# Patient Record
Sex: MALE | Race: OTHER
[De-identification: names, ages, dates, MRNs, and addresses within clinical notes are randomized per-mention and may not be internally consistent; named-entity substitution may affect disease eponyms.]

---

## 2017-10-23 ENCOUNTER — HOSPITAL ENCOUNTER (OUTPATIENT)
Dept: HOSPITAL 22 - SDC | Age: 62
End: 2017-10-23
Attending: INTERNAL MEDICINE
Payer: COMMERCIAL

## 2017-10-23 VITALS — DIASTOLIC BLOOD PRESSURE: 96 MMHG | SYSTOLIC BLOOD PRESSURE: 165 MMHG

## 2017-10-23 DIAGNOSIS — Z53.9: Primary | ICD-10-CM

## 2017-12-15 ENCOUNTER — HOSPITAL ENCOUNTER (OUTPATIENT)
Dept: HOSPITAL 22 - SDC | Age: 62
Discharge: HOME | End: 2017-12-15
Attending: INTERNAL MEDICINE
Payer: COMMERCIAL

## 2017-12-15 VITALS — DIASTOLIC BLOOD PRESSURE: 76 MMHG | SYSTOLIC BLOOD PRESSURE: 129 MMHG

## 2017-12-15 DIAGNOSIS — K63.5: ICD-10-CM

## 2017-12-15 DIAGNOSIS — Z12.11: Primary | ICD-10-CM

## 2017-12-15 DIAGNOSIS — K57.30: ICD-10-CM

## 2017-12-15 DIAGNOSIS — K64.0: ICD-10-CM

## 2017-12-15 PROCEDURE — 0DBM8ZX EXCISION OF DESCENDING COLON, VIA NATURAL OR ARTIFICIAL OPENING ENDOSCOPIC, DIAGNOSTIC: ICD-10-PCS | Performed by: INTERNAL MEDICINE

## 2017-12-15 NOTE — OPERATIVE NOTE
Colonoscopy ([facility])
Procedure date: 12/15/17
Date of birth:
[birth year]
 
Procedure:Colonoscopy
Colonoscopy with cold snare polypectomy
Indications:
Mr. Gracía is a 62-year-old gentleman who is here for follow-up screening 
colonoscopy. The patient did have a colonoscopy in September 2011 at which time 
for colon polyps (tubular adenomas 4) were removed. He reports no abdominal 
pain, weight loss, change in bowel habits or rectal bleeding. He reports no 
family history of colon cancer.
Performing Provider:
Alverto Urias MD
 
Referrring Provider:
Vinayak Alva M.D.
 
Sedation:
Fentanyl 100 mg IV/Versed 5 mg IV
Procedure:
Prior to the procedure, a history and physical exam was performed, and patient 
medications and allergies were reviewed. The risks and benefits of the procedure
and the sedation options and risks were discussed with the patient. All 
questions were answered and informed consent was obtained. Patient 
identification and proposed procedure were verified by the physician and the 
nurse. The patient was placed in a left lateral decubitus position. Throughout 
the procedure, the patient's blood pressure, pulse, and oxygen saturations were 
monitored continuously.  
 
Findings:
On digital rectal examination there was normal rectal tone. There were no 
external hemorrhoids. The prostate was 2+, mildly firm but symmetric without 
nodules. The colonoscope was introduced through the anal canal to the rectum and
advanced to the cecum. The ileocecal valve and appendiceal orifice were 
identified. The scope was advanced a short distance into the ileum which 
appeared grossly normal. The scope was then withdrawn into the colon. The cecum,
ascending and transverse colon and mucosa were grossly normal. There were 
scattered diverticuli throughout the descending and sigmoid colon (LEFT colon). 
There was a single 6 mm polyp in the descending colon removed via cold snare 
polypectomy. The rectum itself was normal. Upon retroflexion within the rectum 
there were grade 1 internal hemorrhoids.
Impressions:
1. Descending colon polyp
2. Mild left-sided diverticulosis
3. Grade 1 internal hemorrhoids
Recommendations:
I will follow up the polyp pathology and recommend repeat colonoscopy again in 5
years based upon the polyp histology.
 
I would encourage fiber supplementation on a long-term daily maintenance basis.
Complications:
None
 
 
 
EBL (ml): 0
 
Electronically Signed by ALVERTO URIAS on 12/15/17 at 0921

## 2017-12-15 NOTE — OPERATIVE NOTE
Colonoscopy ([facility])
Procedure date: 12/15/17
Date of birth:
[birth year]
 
Procedure:Colonoscopy
Colonoscopy with cold snare polypectomy
Indications:
Mr. García is a 62-year-old gentleman who is here for follow-up screening 
colonoscopy. The patient did have a colonoscopy in September 2011 at which time 
for colon polyps (tubular adenomas 4) were removed. He reports no abdominal 
pain, weight loss, change in bowel habits or rectal bleeding. He reports no 
family history of colon cancer.
Performing Provider:
Alverto Urias MD
 
Referrring Provider:
Vinayak Alva M.D.
 
Sedation:
Fentanyl 100 mg IV/Versed 5 mg IV
Procedure:
Prior to the procedure, a history and physical exam was performed, and patient 
medications and allergies were reviewed. The risks and benefits of the procedure
and the sedation options and risks were discussed with the patient. All 
questions were answered and informed consent was obtained. Patient 
identification and proposed procedure were verified by the physician and the 
nurse. The patient was placed in a left lateral decubitus position. Throughout 
the procedure, the patient's blood pressure, pulse, and oxygen saturations were 
monitored continuously.  
 
Findings:
On digital rectal examination there was normal rectal tone. There were no 
external hemorrhoids. The prostate was 2+, mildly firm but symmetric without 
nodules. The colonoscope was introduced through the anal canal to the rectum and
advanced to the cecum. The ileocecal valve and appendiceal orifice were 
identified. The scope was advanced a short distance into the ileum which 
appeared grossly normal. The scope was then withdrawn into the colon. The cecum,
ascending and transverse colon and mucosa were grossly normal. There were 
scattered diverticuli throughout the descending and sigmoid colon (LEFT colon). 
There was a single 6 mm polyp in the descending colon removed via cold snare 
polypectomy. The rectum itself was normal. Upon retroflexion within the rectum 
there were grade 1 internal hemorrhoids.
Impressions:
1. Descending colon polyp
2. Mild left-sided diverticulosis
3. Grade 1 internal hemorrhoids
Recommendations:
I will follow up the polyp pathology and recommend repeat colonoscopy again in 5
years based upon the polyp histology.
 
I would encourage fiber supplementation on a long-term daily maintenance basis.
Complications:
None
 
 
 
EBL (ml): 0
 
Electronically Signed by ALVERTO URIAS on 12/15/17 at 0921

## 2019-07-19 ENCOUNTER — HOSPITAL ENCOUNTER (INPATIENT)
Facility: HOSPITAL | Age: 64
LOS: 7 days | Discharge: HOME HEALTH | End: 2019-07-26
Attending: INTERNAL MEDICINE | Admitting: ORTHOPAEDIC SURGERY

## 2019-07-19 ENCOUNTER — OFFICE VISIT (OUTPATIENT)
Dept: ORTHOPEDIC SURGERY | Facility: CLINIC | Age: 64
End: 2019-07-19

## 2019-07-19 VITALS — HEIGHT: 68 IN | BODY MASS INDEX: 27.74 KG/M2 | WEIGHT: 183 LBS | OXYGEN SATURATION: 92 % | HEART RATE: 100 BPM

## 2019-07-19 DIAGNOSIS — Z98.890 S/P DEBRIDEMENT: ICD-10-CM

## 2019-07-19 DIAGNOSIS — E11.65 UNCONTROLLED TYPE 2 DIABETES MELLITUS WITH HYPERGLYCEMIA (HCC): ICD-10-CM

## 2019-07-19 DIAGNOSIS — M86.10 OSTEOMYELITIS, ACUTE (HCC): Primary | ICD-10-CM

## 2019-07-19 DIAGNOSIS — M86.372 CHRONIC MULTIFOCAL OSTEOMYELITIS OF LEFT FOOT (HCC): ICD-10-CM

## 2019-07-19 DIAGNOSIS — M86.10 OSTEOMYELITIS, ACUTE (HCC): ICD-10-CM

## 2019-07-19 DIAGNOSIS — Z74.09 IMPAIRED FUNCTIONAL MOBILITY, BALANCE, GAIT, AND ENDURANCE: Primary | ICD-10-CM

## 2019-07-19 DIAGNOSIS — E11.42 DIABETIC POLYNEUROPATHY ASSOCIATED WITH TYPE 2 DIABETES MELLITUS (HCC): ICD-10-CM

## 2019-07-19 DIAGNOSIS — G89.18 ACUTE POSTOPERATIVE PAIN: ICD-10-CM

## 2019-07-19 DIAGNOSIS — R09.89 DECREASED PULSES IN FEET: ICD-10-CM

## 2019-07-19 PROBLEM — M86.9 OSTEOMYELITIS OF LEFT FOOT (HCC): Status: ACTIVE | Noted: 2019-07-19

## 2019-07-19 LAB
ANION GAP SERPL CALCULATED.3IONS-SCNC: 16 MMOL/L (ref 5–15)
BASOPHILS # BLD AUTO: 0.09 10*3/MM3 (ref 0–0.2)
BASOPHILS NFR BLD AUTO: 0.9 % (ref 0–1.5)
BUN BLD-MCNC: 24 MG/DL (ref 8–23)
BUN/CREAT SERPL: 22.2 (ref 7–25)
CALCIUM SPEC-SCNC: 8.7 MG/DL (ref 8.6–10.5)
CHLORIDE SERPL-SCNC: 96 MMOL/L (ref 98–107)
CO2 SERPL-SCNC: 20 MMOL/L (ref 22–29)
CREAT BLD-MCNC: 1.08 MG/DL (ref 0.76–1.27)
CRP SERPL-MCNC: 1.89 MG/DL (ref 0–0.5)
DEPRECATED RDW RBC AUTO: 45.1 FL (ref 37–54)
EOSINOPHIL # BLD AUTO: 0.2 10*3/MM3 (ref 0–0.4)
EOSINOPHIL NFR BLD AUTO: 2.1 % (ref 0.3–6.2)
ERYTHROCYTE [DISTWIDTH] IN BLOOD BY AUTOMATED COUNT: 13 % (ref 12.3–15.4)
ERYTHROCYTE [SEDIMENTATION RATE] IN BLOOD: 93 MM/HR (ref 0–20)
GFR SERPL CREATININE-BSD FRML MDRD: 69 ML/MIN/1.73
GLUCOSE BLD-MCNC: 139 MG/DL (ref 65–99)
GLUCOSE BLDC GLUCOMTR-MCNC: 110 MG/DL (ref 70–130)
GLUCOSE BLDC GLUCOMTR-MCNC: 220 MG/DL (ref 70–130)
HBA1C MFR BLD: 12.4 % (ref 4.8–5.6)
HCT VFR BLD AUTO: 37.8 % (ref 37.5–51)
HGB BLD-MCNC: 11.9 G/DL (ref 13–17.7)
IMM GRANULOCYTES # BLD AUTO: 0.08 10*3/MM3 (ref 0–0.05)
IMM GRANULOCYTES NFR BLD AUTO: 0.8 % (ref 0–0.5)
LYMPHOCYTES # BLD AUTO: 1.45 10*3/MM3 (ref 0.7–3.1)
LYMPHOCYTES NFR BLD AUTO: 15.2 % (ref 19.6–45.3)
MCH RBC QN AUTO: 29.7 PG (ref 26.6–33)
MCHC RBC AUTO-ENTMCNC: 31.5 G/DL (ref 31.5–35.7)
MCV RBC AUTO: 94.3 FL (ref 79–97)
MONOCYTES # BLD AUTO: 0.62 10*3/MM3 (ref 0.1–0.9)
MONOCYTES NFR BLD AUTO: 6.5 % (ref 5–12)
NEUTROPHILS # BLD AUTO: 7.13 10*3/MM3 (ref 1.7–7)
NEUTROPHILS NFR BLD AUTO: 74.5 % (ref 42.7–76)
NRBC BLD AUTO-RTO: 0 /100 WBC (ref 0–0.2)
PLATELET # BLD AUTO: 547 10*3/MM3 (ref 140–450)
PMV BLD AUTO: 8.8 FL (ref 6–12)
POTASSIUM BLD-SCNC: 3.6 MMOL/L (ref 3.5–5.2)
RBC # BLD AUTO: 4.01 10*6/MM3 (ref 4.14–5.8)
SODIUM BLD-SCNC: 132 MMOL/L (ref 136–145)
WBC NRBC COR # BLD: 9.57 10*3/MM3 (ref 3.4–10.8)

## 2019-07-19 PROCEDURE — 83036 HEMOGLOBIN GLYCOSYLATED A1C: CPT | Performed by: ORTHOPAEDIC SURGERY

## 2019-07-19 PROCEDURE — 87040 BLOOD CULTURE FOR BACTERIA: CPT | Performed by: ORTHOPAEDIC SURGERY

## 2019-07-19 PROCEDURE — 25010000002 PIPERACILLIN SOD-TAZOBACTAM PER 1 G: Performed by: NURSE PRACTITIONER

## 2019-07-19 PROCEDURE — 63710000001 INSULIN LISPRO (HUMAN) PER 5 UNITS: Performed by: INTERNAL MEDICINE

## 2019-07-19 PROCEDURE — 85025 COMPLETE CBC W/AUTO DIFF WBC: CPT | Performed by: ORTHOPAEDIC SURGERY

## 2019-07-19 PROCEDURE — 85652 RBC SED RATE AUTOMATED: CPT | Performed by: ORTHOPAEDIC SURGERY

## 2019-07-19 PROCEDURE — 25010000002 ENOXAPARIN PER 10 MG: Performed by: INTERNAL MEDICINE

## 2019-07-19 PROCEDURE — 82962 GLUCOSE BLOOD TEST: CPT

## 2019-07-19 PROCEDURE — 86140 C-REACTIVE PROTEIN: CPT | Performed by: ORTHOPAEDIC SURGERY

## 2019-07-19 PROCEDURE — 99205 OFFICE O/P NEW HI 60 MIN: CPT | Performed by: ORTHOPAEDIC SURGERY

## 2019-07-19 PROCEDURE — 80048 BASIC METABOLIC PNL TOTAL CA: CPT | Performed by: ORTHOPAEDIC SURGERY

## 2019-07-19 PROCEDURE — 63710000001 INSULIN DETEMIR PER 5 UNITS: Performed by: INTERNAL MEDICINE

## 2019-07-19 RX ORDER — SODIUM CHLORIDE 0.9 % (FLUSH) 0.9 %
3 SYRINGE (ML) INJECTION EVERY 12 HOURS SCHEDULED
Status: DISCONTINUED | OUTPATIENT
Start: 2019-07-19 | End: 2019-07-26 | Stop reason: HOSPADM

## 2019-07-19 RX ORDER — SODIUM CHLORIDE 0.9 % (FLUSH) 0.9 %
3-10 SYRINGE (ML) INJECTION AS NEEDED
Status: DISCONTINUED | OUTPATIENT
Start: 2019-07-19 | End: 2019-07-26 | Stop reason: HOSPADM

## 2019-07-19 RX ORDER — ONDANSETRON 2 MG/ML
4 INJECTION INTRAMUSCULAR; INTRAVENOUS EVERY 6 HOURS PRN
Status: DISCONTINUED | OUTPATIENT
Start: 2019-07-19 | End: 2019-07-23

## 2019-07-19 RX ORDER — DEXTROSE MONOHYDRATE 25 G/50ML
25 INJECTION, SOLUTION INTRAVENOUS
Status: DISCONTINUED | OUTPATIENT
Start: 2019-07-19 | End: 2019-07-26 | Stop reason: HOSPADM

## 2019-07-19 RX ORDER — CALCIUM CARBONATE 200(500)MG
2 TABLET,CHEWABLE ORAL 2 TIMES DAILY PRN
Status: DISCONTINUED | OUTPATIENT
Start: 2019-07-19 | End: 2019-07-26 | Stop reason: HOSPADM

## 2019-07-19 RX ORDER — HUMAN INSULIN 100 [IU]/ML
INJECTION, SOLUTION SUBCUTANEOUS
COMMUNITY
Start: 2019-07-15

## 2019-07-19 RX ORDER — NICOTINE POLACRILEX 4 MG
15 LOZENGE BUCCAL
Status: DISCONTINUED | OUTPATIENT
Start: 2019-07-19 | End: 2019-07-26 | Stop reason: HOSPADM

## 2019-07-19 RX ORDER — AMOXICILLIN AND CLAVULANATE POTASSIUM 875; 125 MG/1; MG/1
TABLET, FILM COATED ORAL
COMMUNITY
Start: 2019-07-15 | End: 2019-07-26 | Stop reason: HOSPADM

## 2019-07-19 RX ORDER — ONDANSETRON 4 MG/1
4 TABLET, FILM COATED ORAL EVERY 6 HOURS PRN
Status: DISCONTINUED | OUTPATIENT
Start: 2019-07-19 | End: 2019-07-26 | Stop reason: HOSPADM

## 2019-07-19 RX ORDER — DOCUSATE SODIUM 100 MG/1
100 CAPSULE, LIQUID FILLED ORAL 2 TIMES DAILY PRN
Status: DISCONTINUED | OUTPATIENT
Start: 2019-07-19 | End: 2019-07-26 | Stop reason: HOSPADM

## 2019-07-19 RX ORDER — SODIUM CHLORIDE 0.9 % (FLUSH) 0.9 %
5 SYRINGE (ML) INJECTION AS NEEDED
Status: DISCONTINUED | OUTPATIENT
Start: 2019-07-19 | End: 2019-07-26 | Stop reason: HOSPADM

## 2019-07-19 RX ORDER — SYRING-NEEDL,DISP,INSUL,0.3 ML 31 GX5/16"
SYRINGE, EMPTY DISPOSABLE MISCELLANEOUS
COMMUNITY
Start: 2019-07-15

## 2019-07-19 RX ORDER — GABAPENTIN 600 MG/1
TABLET ORAL
COMMUNITY
Start: 2019-06-13

## 2019-07-19 RX ORDER — ACETAMINOPHEN 325 MG/1
650 TABLET ORAL EVERY 4 HOURS PRN
Status: DISCONTINUED | OUTPATIENT
Start: 2019-07-19 | End: 2019-07-26 | Stop reason: HOSPADM

## 2019-07-19 RX ADMIN — SODIUM CHLORIDE, PRESERVATIVE FREE 3 ML: 5 INJECTION INTRAVENOUS at 20:40

## 2019-07-19 RX ADMIN — SODIUM CHLORIDE, PRESERVATIVE FREE 3 ML: 5 INJECTION INTRAVENOUS at 13:00

## 2019-07-19 RX ADMIN — INSULIN DETEMIR 50 UNITS: 100 INJECTION, SOLUTION SUBCUTANEOUS at 16:55

## 2019-07-19 RX ADMIN — ENOXAPARIN SODIUM 40 MG: 40 INJECTION SUBCUTANEOUS at 15:44

## 2019-07-19 RX ADMIN — ACETAMINOPHEN 650 MG: 325 TABLET, FILM COATED ORAL at 20:48

## 2019-07-19 RX ADMIN — INSULIN LISPRO 5 UNITS: 100 INJECTION, SOLUTION INTRAVENOUS; SUBCUTANEOUS at 17:09

## 2019-07-19 RX ADMIN — INSULIN LISPRO 4 UNITS: 100 INJECTION, SOLUTION INTRAVENOUS; SUBCUTANEOUS at 17:08

## 2019-07-19 RX ADMIN — TAZOBACTAM SODIUM AND PIPERACILLIN SODIUM 3.38 G: 375; 3 INJECTION, SOLUTION INTRAVENOUS at 20:40

## 2019-07-19 NOTE — PROGRESS NOTES
"NEW PATIENT    Patient: Scott Benz  : 1955    Primary Care Provider: Ottoniel Florez MD    Requesting Provider: As above    Pain of the Left Foot       History    Chief Complaint: Left foot fourth toe and metatarsal amputation    History of Present Illness: This is a 64-year-old gentleman here today with his wife.  He is seen at the request of Dr. Ottoniel Florez.  He has a complex history with respect to the left foot.  He has long-standing diabetes with very poor control.  He reports that his most recent hemoglobin A1c was over 12.  He had developed a red spot on his left foot around .  They treated it with topical antibiotic cream.  He was admitted to the [facility] on  and underwent irrigation and debridement x2.  He had amputation of the fourth toe and distal aspect of the metatarsal.  The wound was left open with a wound VAC.  He reports he had IV antibiotics for 7 days.  He was then discharged home on oral Augmentin.  He did not bring any cultures or records.  We had to call to find them.  He thinks he had vascular studies done somewhere in [place] about a year ago.  They are not in the [facility].  His wife has pictures of his foot, it shows diabetic foot infection with very dusky tissue.  He saw Dr. Florez after he was discharged and asked him for another opinion.  He has a wound VAC in place today.  He is in a wheelchair.  He does not smoke.    No current facility-administered medications on file prior to visit.      Current Outpatient Medications on File Prior to Visit   Medication Sig Dispense Refill   • amoxicillin-clavulanate (AUGMENTIN) 875-125 MG per tablet      • gabapentin (NEURONTIN) 600 MG tablet      • NOVOLIN R 100 UNIT/ML injection      • TRUEPLUS INSULIN SYRINGE 31G X \" 0.3 ML misc         No Known Allergies   Past Medical History:   Diagnosis Date   • Diabetes (CMS/[facility])      Past Surgical History:   Procedure Laterality Date   • AMPUTATION DIGIT Left " "7/23/2019    Procedure: excision left 4th metatarsal;  Surgeon: Shannan Kraft MD;  Location:  [facility];  Service: Orthopedics   • APPENDECTOMY     • INCISION AND DRAINAGE LEG Left 7/23/2019    Procedure: I&D left foot wound;  Surgeon: Shannan Kraft MD;  Location:  [facility];  Service: Orthopedics     Family History   Problem Relation Age of Onset   • Stroke Father    • Diabetes Father       Social History     Socioeconomic History   • Marital status:      Spouse name: Not on file   • Number of children: Not on file   • Years of education: Not on file   • Highest education level: Not on file   Tobacco Use   • Smoking status: Never Smoker   • Smokeless tobacco: Never Used   Substance and Sexual Activity   • Alcohol use: No     Frequency: Never   • Drug use: No   • Sexual activity: Defer        Review of Systems   Constitutional: Positive for activity change.   HENT: Negative.    Eyes: Negative.    Respiratory: Negative.    Cardiovascular: Negative.    Gastrointestinal: Negative.    Endocrine: Negative.    Genitourinary: Negative.    Musculoskeletal: Positive for joint swelling.   Skin: Negative.    Allergic/Immunologic: Negative.    Neurological: Negative.    Hematological: Negative.    Psychiatric/Behavioral: Negative.        The following portions of the patient's history were reviewed and updated as appropriate: allergies, current medications, past family history, past medical history, past social history, past surgical history and problem list.    Physical Exam:   Pulse 100   Ht 172.7 cm (68\")   Wt 83 kg (183 lb)   SpO2 92%   BMI 27.83 kg/m²   GENERAL: Body habitus: normal weight for height    Lower extremity edema: Right: none; Leftt: none    Varicose veins:  Right: none; Left: none    Gait: in wheelchair     Mental Status:  awake and alert; oriented to person, place, and time    Voice:  clear  SKIN:  No cyanosis     Hair Growth:  Right:diminished; Left:  diminished  NAILS: Toenails: " thick  HEENT: Head: Normocephalic, atraumatic,  without obvious abnormality.  eye: normal external eye, no icterus  ears: normal external ears  nose: normal external nose  pharynx: dental hygiene adequate  PULM:  Repiratory effort normal  CV:  Dorsalis Pedis:  Right: not palpable; Left:not palpable    Posterior Tibial: Right:not palpable; Left:1+    Capillary Refill:  Brisk  MSK:  Hand:right handed, Dupuytren's left,   and Dupuytren's right,        Tibia:  Right:  non tender; Left:  non tender      Ankle:  Right: non tender, ROM  normal and motor function  normal; Left:  non tender      Foot:  Right:  non tender, ROM  normal and motor function  normal and No calluses, no ulcers, no pre-ulcerous lesions; Left:  Open fourth ray amputation, there is exposed bone in the base, there is exposed tendons on the medial side of the wound, there is erythema extending to the great toe on the plantar surface, there is some epidermal lysis, there is swelling of the forefoot, no proximal erythema.  Dense neuropathy.  The ankle does dorsiflex and plantarflex      NEURO: Heel Walking:  Right:  unable to test; Left:  unable to test    Toe Walking:  Right:  unable to test; Left:  unable to test     Kiester-Vida 5.07 monofilament test: absent    Lower extremity sensation: diminished            Medical Decision Making    Data Review:   ordered and reviewed x-rays today, reviewed prior lab results, reviewed radiology images, reviewed radiology results, reviewed outside records and phone conversation with physician we called both the [facility], as well as [facility] to obtain cultures, and the ABIs.  I spoke with Dr. Florez on the phone, I spoke with Dr. HOTY on the phone, I spoke with Dr. Man.    Assessment and Plan/ Diagnosis/Treatment options:   1. Osteomyelitis, acute (CMS/HCC)  He has exposed bone in the wound, he needs IV antibiotics and a PICC line.  This may not be salvageable at all.  He may end up with a  below-knee amputation.  I do not feel his pulses.  ABIs were done at [facility] 7/17/2018, the vessels were noncompressible, they were read as multiphasic waveforms.  These will need to be repeated.  We obtained cultures from the [facility], he grew staph epidermidis, strep viridans, and Veillonella parvula on the anaerobic cultures.  We will admit him to the hospital today.  I discussed this with all the doctors as noted above.  I discussed it with the patient and his wife.  We need to do an MRI to look at the rest of the foot.  He has erythema and epidermal lysis around the second and third metatarsal heads on the plantar surface, the foot may not be salvageable.  He may very well end up with a below-knee amputation.  At present we will use wet-to-dry dressings until the MRI has been obtained.  The wound is extremely dry today, and I do not think the wound VAC is effective right now.      2. Uncontrolled type 2 diabetes mellitus with hyperglycemia (CMS/HCC)  He absolutely needs to improve his glucose control to try and salvage this    3. Diabetic polyneuropathy associated with type 2 diabetes mellitus (CMS/HCC)  Dense neuropathy    4. Decreased pulses in feet  We will repeat the blood flow studies

## 2019-07-20 ENCOUNTER — APPOINTMENT (OUTPATIENT)
Dept: MRI IMAGING | Facility: HOSPITAL | Age: 64
End: 2019-07-20

## 2019-07-20 ENCOUNTER — APPOINTMENT (OUTPATIENT)
Dept: CARDIOLOGY | Facility: HOSPITAL | Age: 64
End: 2019-07-20

## 2019-07-20 LAB
ALBUMIN SERPL-MCNC: 3.4 G/DL (ref 3.5–5.2)
ALBUMIN/GLOB SERPL: 0.7 G/DL
ALP SERPL-CCNC: 78 U/L (ref 39–117)
ALT SERPL W P-5'-P-CCNC: 12 U/L (ref 1–41)
ANION GAP SERPL CALCULATED.3IONS-SCNC: 16 MMOL/L (ref 5–15)
AST SERPL-CCNC: 24 U/L (ref 1–40)
BASOPHILS # BLD AUTO: 0.07 10*3/MM3 (ref 0–0.2)
BASOPHILS NFR BLD AUTO: 0.8 % (ref 0–1.5)
BH CV LOWER ARTERIAL LEFT ABI RATIO: 1.27
BH CV LOWER ARTERIAL LEFT DORSALIS PEDIS SYS MAX: 196 MMHG
BH CV LOWER ARTERIAL LEFT GREAT TOE SYS MAX: 146 MMHG
BH CV LOWER ARTERIAL LEFT POST TIBIAL SYS MAX: 179 MMHG
BH CV LOWER ARTERIAL LEFT TBI RATIO: 0.95
BH CV LOWER ARTERIAL RIGHT ABI RATIO: 0.58
BH CV LOWER ARTERIAL RIGHT CALF RATIO: 0.6
BH CV LOWER ARTERIAL RIGHT DORSALIS PEDIS SYS MAX: 89 MMHG
BH CV LOWER ARTERIAL RIGHT GREAT TOE SYS MAX: 46 MMHG
BH CV LOWER ARTERIAL RIGHT HIGH THIGH SYS MAX: 199 MMHG
BH CV LOWER ARTERIAL RIGHT LOW THIGH SYS MAX: 205 MMHG
BH CV LOWER ARTERIAL RIGHT POPLITEAL SYS MAX: 92 MMHG
BH CV LOWER ARTERIAL RIGHT POST TIBIAL SYS MAX: 90 MMHG
BH CV LOWER ARTERIAL RIGHT TBI RATIO: 0.3
BILIRUB SERPL-MCNC: 0.4 MG/DL (ref 0.2–1.2)
BUN BLD-MCNC: 21 MG/DL (ref 8–23)
BUN/CREAT SERPL: 14 (ref 7–25)
CALCIUM SPEC-SCNC: 8.9 MG/DL (ref 8.6–10.5)
CHLORIDE SERPL-SCNC: 97 MMOL/L (ref 98–107)
CO2 SERPL-SCNC: 24 MMOL/L (ref 22–29)
CREAT BLD-MCNC: 1.5 MG/DL (ref 0.76–1.27)
CRP SERPL-MCNC: 2.4 MG/DL (ref 0–0.5)
DEPRECATED RDW RBC AUTO: 44.1 FL (ref 37–54)
EOSINOPHIL # BLD AUTO: 0.19 10*3/MM3 (ref 0–0.4)
EOSINOPHIL NFR BLD AUTO: 2.1 % (ref 0.3–6.2)
ERYTHROCYTE [DISTWIDTH] IN BLOOD BY AUTOMATED COUNT: 13 % (ref 12.3–15.4)
ERYTHROCYTE [SEDIMENTATION RATE] IN BLOOD: 87 MM/HR (ref 0–20)
GFR SERPL CREATININE-BSD FRML MDRD: 47 ML/MIN/1.73
GLOBULIN UR ELPH-MCNC: 5.1 GM/DL
GLUCOSE BLD-MCNC: 73 MG/DL (ref 65–99)
GLUCOSE BLDC GLUCOMTR-MCNC: 196 MG/DL (ref 70–130)
GLUCOSE BLDC GLUCOMTR-MCNC: 224 MG/DL (ref 70–130)
GLUCOSE BLDC GLUCOMTR-MCNC: 70 MG/DL (ref 70–130)
GLUCOSE BLDC GLUCOMTR-MCNC: 77 MG/DL (ref 70–130)
HCT VFR BLD AUTO: 40.4 % (ref 37.5–51)
HGB BLD-MCNC: 13 G/DL (ref 13–17.7)
IMM GRANULOCYTES # BLD AUTO: 0.07 10*3/MM3 (ref 0–0.05)
IMM GRANULOCYTES NFR BLD AUTO: 0.8 % (ref 0–0.5)
LYMPHOCYTES # BLD AUTO: 1.57 10*3/MM3 (ref 0.7–3.1)
LYMPHOCYTES NFR BLD AUTO: 17.7 % (ref 19.6–45.3)
MCH RBC QN AUTO: 29.9 PG (ref 26.6–33)
MCHC RBC AUTO-ENTMCNC: 32.2 G/DL (ref 31.5–35.7)
MCV RBC AUTO: 92.9 FL (ref 79–97)
MONOCYTES # BLD AUTO: 0.66 10*3/MM3 (ref 0.1–0.9)
MONOCYTES NFR BLD AUTO: 7.4 % (ref 5–12)
NEUTROPHILS # BLD AUTO: 6.31 10*3/MM3 (ref 1.7–7)
NEUTROPHILS NFR BLD AUTO: 71.2 % (ref 42.7–76)
NRBC BLD AUTO-RTO: 0 /100 WBC (ref 0–0.2)
PLATELET # BLD AUTO: 635 10*3/MM3 (ref 140–450)
PMV BLD AUTO: 8.7 FL (ref 6–12)
POTASSIUM BLD-SCNC: 3.5 MMOL/L (ref 3.5–5.2)
PROT SERPL-MCNC: 8.5 G/DL (ref 6–8.5)
RBC # BLD AUTO: 4.35 10*6/MM3 (ref 4.14–5.8)
SODIUM BLD-SCNC: 137 MMOL/L (ref 136–145)
UPPER ARTERIAL LEFT ARM BRACHIAL SYS MAX: 154 MMHG
UPPER ARTERIAL RIGHT ARM BRACHIAL SYS MAX: 147 MMHG
WBC NRBC COR # BLD: 8.87 10*3/MM3 (ref 3.4–10.8)

## 2019-07-20 PROCEDURE — 86140 C-REACTIVE PROTEIN: CPT | Performed by: NURSE PRACTITIONER

## 2019-07-20 PROCEDURE — 85652 RBC SED RATE AUTOMATED: CPT | Performed by: NURSE PRACTITIONER

## 2019-07-20 PROCEDURE — 25010000002 PIPERACILLIN SOD-TAZOBACTAM PER 1 G: Performed by: NURSE PRACTITIONER

## 2019-07-20 PROCEDURE — 82962 GLUCOSE BLOOD TEST: CPT

## 2019-07-20 PROCEDURE — 99233 SBSQ HOSP IP/OBS HIGH 50: CPT | Performed by: ORTHOPAEDIC SURGERY

## 2019-07-20 PROCEDURE — 93923 UPR/LXTR ART STDY 3+ LVLS: CPT | Performed by: INTERNAL MEDICINE

## 2019-07-20 PROCEDURE — 73720 MRI LWR EXTREMITY W/O&W/DYE: CPT

## 2019-07-20 PROCEDURE — 63710000001 INSULIN DETEMIR PER 5 UNITS: Performed by: INTERNAL MEDICINE

## 2019-07-20 PROCEDURE — 80053 COMPREHEN METABOLIC PANEL: CPT | Performed by: NURSE PRACTITIONER

## 2019-07-20 PROCEDURE — A9577 INJ MULTIHANCE: HCPCS | Performed by: INTERNAL MEDICINE

## 2019-07-20 PROCEDURE — 0 GADOBENATE DIMEGLUMINE 529 MG/ML SOLUTION: Performed by: INTERNAL MEDICINE

## 2019-07-20 PROCEDURE — 25010000002 ENOXAPARIN PER 10 MG: Performed by: INTERNAL MEDICINE

## 2019-07-20 PROCEDURE — 94799 UNLISTED PULMONARY SVC/PX: CPT

## 2019-07-20 PROCEDURE — 93923 UPR/LXTR ART STDY 3+ LVLS: CPT

## 2019-07-20 PROCEDURE — 85025 COMPLETE CBC W/AUTO DIFF WBC: CPT | Performed by: NURSE PRACTITIONER

## 2019-07-20 RX ADMIN — TAZOBACTAM SODIUM AND PIPERACILLIN SODIUM 3.38 G: 375; 3 INJECTION, SOLUTION INTRAVENOUS at 09:56

## 2019-07-20 RX ADMIN — TAZOBACTAM SODIUM AND PIPERACILLIN SODIUM 3.38 G: 375; 3 INJECTION, SOLUTION INTRAVENOUS at 18:21

## 2019-07-20 RX ADMIN — INSULIN LISPRO 2 UNITS: 100 INJECTION, SOLUTION INTRAVENOUS; SUBCUTANEOUS at 13:08

## 2019-07-20 RX ADMIN — TAZOBACTAM SODIUM AND PIPERACILLIN SODIUM 3.38 G: 375; 3 INJECTION, SOLUTION INTRAVENOUS at 02:05

## 2019-07-20 RX ADMIN — INSULIN LISPRO 4 UNITS: 100 INJECTION, SOLUTION INTRAVENOUS; SUBCUTANEOUS at 20:53

## 2019-07-20 RX ADMIN — INSULIN LISPRO 5 UNITS: 100 INJECTION, SOLUTION INTRAVENOUS; SUBCUTANEOUS at 13:08

## 2019-07-20 RX ADMIN — ENOXAPARIN SODIUM 40 MG: 40 INJECTION SUBCUTANEOUS at 09:57

## 2019-07-20 RX ADMIN — SODIUM CHLORIDE, PRESERVATIVE FREE 3 ML: 5 INJECTION INTRAVENOUS at 20:49

## 2019-07-20 RX ADMIN — GADOBENATE DIMEGLUMINE 15 ML: 529 INJECTION, SOLUTION INTRAVENOUS at 07:30

## 2019-07-20 RX ADMIN — INSULIN DETEMIR 50 UNITS: 100 INJECTION, SOLUTION SUBCUTANEOUS at 09:55

## 2019-07-20 RX ADMIN — SODIUM CHLORIDE, PRESERVATIVE FREE 3 ML: 5 INJECTION INTRAVENOUS at 09:58

## 2019-07-21 LAB
ALBUMIN SERPL-MCNC: 3 G/DL (ref 3.5–5.2)
ALBUMIN/GLOB SERPL: 0.7 G/DL
ALP SERPL-CCNC: 67 U/L (ref 39–117)
ALT SERPL W P-5'-P-CCNC: 6 U/L (ref 1–41)
ANION GAP SERPL CALCULATED.3IONS-SCNC: 14 MMOL/L (ref 5–15)
AST SERPL-CCNC: 10 U/L (ref 1–40)
BASOPHILS # BLD AUTO: 0.06 10*3/MM3 (ref 0–0.2)
BASOPHILS NFR BLD AUTO: 0.8 % (ref 0–1.5)
BILIRUB SERPL-MCNC: 0.2 MG/DL (ref 0.2–1.2)
BUN BLD-MCNC: 19 MG/DL (ref 8–23)
BUN/CREAT SERPL: 14.5 (ref 7–25)
CALCIUM SPEC-SCNC: 8.4 MG/DL (ref 8.6–10.5)
CHLORIDE SERPL-SCNC: 99 MMOL/L (ref 98–107)
CO2 SERPL-SCNC: 24 MMOL/L (ref 22–29)
CREAT BLD-MCNC: 1.31 MG/DL (ref 0.76–1.27)
CRP SERPL-MCNC: 1.88 MG/DL (ref 0–0.5)
DEPRECATED RDW RBC AUTO: 43.8 FL (ref 37–54)
EOSINOPHIL # BLD AUTO: 0.24 10*3/MM3 (ref 0–0.4)
EOSINOPHIL NFR BLD AUTO: 3 % (ref 0.3–6.2)
ERYTHROCYTE [DISTWIDTH] IN BLOOD BY AUTOMATED COUNT: 13 % (ref 12.3–15.4)
ERYTHROCYTE [SEDIMENTATION RATE] IN BLOOD: 100 MM/HR (ref 0–20)
GFR SERPL CREATININE-BSD FRML MDRD: 55 ML/MIN/1.73
GLOBULIN UR ELPH-MCNC: 4.2 GM/DL
GLUCOSE BLD-MCNC: 84 MG/DL (ref 65–99)
GLUCOSE BLDC GLUCOMTR-MCNC: 160 MG/DL (ref 70–130)
GLUCOSE BLDC GLUCOMTR-MCNC: 163 MG/DL (ref 70–130)
GLUCOSE BLDC GLUCOMTR-MCNC: 200 MG/DL (ref 70–130)
GLUCOSE BLDC GLUCOMTR-MCNC: 77 MG/DL (ref 70–130)
HCT VFR BLD AUTO: 36 % (ref 37.5–51)
HGB BLD-MCNC: 11.9 G/DL (ref 13–17.7)
IMM GRANULOCYTES # BLD AUTO: 0.02 10*3/MM3 (ref 0–0.05)
IMM GRANULOCYTES NFR BLD AUTO: 0.3 % (ref 0–0.5)
LYMPHOCYTES # BLD AUTO: 1.57 10*3/MM3 (ref 0.7–3.1)
LYMPHOCYTES NFR BLD AUTO: 19.6 % (ref 19.6–45.3)
MCH RBC QN AUTO: 30.4 PG (ref 26.6–33)
MCHC RBC AUTO-ENTMCNC: 33.1 G/DL (ref 31.5–35.7)
MCV RBC AUTO: 91.8 FL (ref 79–97)
MONOCYTES # BLD AUTO: 0.72 10*3/MM3 (ref 0.1–0.9)
MONOCYTES NFR BLD AUTO: 9 % (ref 5–12)
NEUTROPHILS # BLD AUTO: 5.38 10*3/MM3 (ref 1.7–7)
NEUTROPHILS NFR BLD AUTO: 67.3 % (ref 42.7–76)
NRBC BLD AUTO-RTO: 0 /100 WBC (ref 0–0.2)
PLATELET # BLD AUTO: 508 10*3/MM3 (ref 140–450)
PMV BLD AUTO: 8.9 FL (ref 6–12)
POTASSIUM BLD-SCNC: 3.4 MMOL/L (ref 3.5–5.2)
PROT SERPL-MCNC: 7.2 G/DL (ref 6–8.5)
RBC # BLD AUTO: 3.92 10*6/MM3 (ref 4.14–5.8)
SODIUM BLD-SCNC: 137 MMOL/L (ref 136–145)
WBC NRBC COR # BLD: 7.99 10*3/MM3 (ref 3.4–10.8)

## 2019-07-21 PROCEDURE — 97162 PT EVAL MOD COMPLEX 30 MIN: CPT

## 2019-07-21 PROCEDURE — 94799 UNLISTED PULMONARY SVC/PX: CPT

## 2019-07-21 PROCEDURE — 99231 SBSQ HOSP IP/OBS SF/LOW 25: CPT | Performed by: ORTHOPAEDIC SURGERY

## 2019-07-21 PROCEDURE — 85652 RBC SED RATE AUTOMATED: CPT | Performed by: NURSE PRACTITIONER

## 2019-07-21 PROCEDURE — 80053 COMPREHEN METABOLIC PANEL: CPT | Performed by: NURSE PRACTITIONER

## 2019-07-21 PROCEDURE — 86140 C-REACTIVE PROTEIN: CPT | Performed by: NURSE PRACTITIONER

## 2019-07-21 PROCEDURE — 63710000001 INSULIN DETEMIR PER 5 UNITS: Performed by: INTERNAL MEDICINE

## 2019-07-21 PROCEDURE — 25010000002 ENOXAPARIN PER 10 MG: Performed by: INTERNAL MEDICINE

## 2019-07-21 PROCEDURE — 85025 COMPLETE CBC W/AUTO DIFF WBC: CPT | Performed by: NURSE PRACTITIONER

## 2019-07-21 PROCEDURE — 25010000002 PIPERACILLIN SOD-TAZOBACTAM PER 1 G: Performed by: NURSE PRACTITIONER

## 2019-07-21 PROCEDURE — 82962 GLUCOSE BLOOD TEST: CPT

## 2019-07-21 RX ORDER — POTASSIUM CHLORIDE 750 MG/1
40 CAPSULE, EXTENDED RELEASE ORAL EVERY 6 HOURS
Status: COMPLETED | OUTPATIENT
Start: 2019-07-21 | End: 2019-07-21

## 2019-07-21 RX ORDER — GABAPENTIN 300 MG/1
600 CAPSULE ORAL EVERY 12 HOURS SCHEDULED
Status: DISCONTINUED | OUTPATIENT
Start: 2019-07-21 | End: 2019-07-26 | Stop reason: HOSPADM

## 2019-07-21 RX ORDER — MINERAL OIL AND WHITE PETROLATUM 150; 830 MG/G; MG/G
OINTMENT OPHTHALMIC
Status: ACTIVE | OUTPATIENT
Start: 2019-07-21 | End: 2019-07-26

## 2019-07-21 RX ADMIN — INSULIN LISPRO 2 UNITS: 100 INJECTION, SOLUTION INTRAVENOUS; SUBCUTANEOUS at 17:17

## 2019-07-21 RX ADMIN — TAZOBACTAM SODIUM AND PIPERACILLIN SODIUM 3.38 G: 375; 3 INJECTION, SOLUTION INTRAVENOUS at 10:17

## 2019-07-21 RX ADMIN — TAZOBACTAM SODIUM AND PIPERACILLIN SODIUM 3.38 G: 375; 3 INJECTION, SOLUTION INTRAVENOUS at 17:17

## 2019-07-21 RX ADMIN — ACETAMINOPHEN 650 MG: 325 TABLET, FILM COATED ORAL at 11:40

## 2019-07-21 RX ADMIN — INSULIN LISPRO 2 UNITS: 100 INJECTION, SOLUTION INTRAVENOUS; SUBCUTANEOUS at 21:09

## 2019-07-21 RX ADMIN — TAZOBACTAM SODIUM AND PIPERACILLIN SODIUM 3.38 G: 375; 3 INJECTION, SOLUTION INTRAVENOUS at 03:03

## 2019-07-21 RX ADMIN — POTASSIUM CHLORIDE 40 MEQ: 750 CAPSULE, EXTENDED RELEASE ORAL at 12:20

## 2019-07-21 RX ADMIN — GABAPENTIN 600 MG: 300 CAPSULE ORAL at 20:51

## 2019-07-21 RX ADMIN — GABAPENTIN 600 MG: 300 CAPSULE ORAL at 12:20

## 2019-07-21 RX ADMIN — POTASSIUM CHLORIDE 40 MEQ: 750 CAPSULE, EXTENDED RELEASE ORAL at 17:17

## 2019-07-21 RX ADMIN — INSULIN DETEMIR 50 UNITS: 100 INJECTION, SOLUTION SUBCUTANEOUS at 10:54

## 2019-07-21 RX ADMIN — INSULIN LISPRO 5 UNITS: 100 INJECTION, SOLUTION INTRAVENOUS; SUBCUTANEOUS at 11:40

## 2019-07-21 RX ADMIN — INSULIN LISPRO 4 UNITS: 100 INJECTION, SOLUTION INTRAVENOUS; SUBCUTANEOUS at 11:40

## 2019-07-21 RX ADMIN — ENOXAPARIN SODIUM 40 MG: 40 INJECTION SUBCUTANEOUS at 08:42

## 2019-07-21 RX ADMIN — INSULIN LISPRO 5 UNITS: 100 INJECTION, SOLUTION INTRAVENOUS; SUBCUTANEOUS at 17:20

## 2019-07-22 ENCOUNTER — ANESTHESIA EVENT (OUTPATIENT)
Dept: PERIOP | Facility: HOSPITAL | Age: 64
End: 2019-07-22

## 2019-07-22 PROBLEM — M86.372 CHRONIC MULTIFOCAL OSTEOMYELITIS OF LEFT FOOT: Status: ACTIVE | Noted: 2019-07-19

## 2019-07-22 PROBLEM — Z74.09 IMPAIRED FUNCTIONAL MOBILITY, BALANCE, GAIT, AND ENDURANCE: Status: ACTIVE | Noted: 2019-07-19

## 2019-07-22 LAB
ANION GAP SERPL CALCULATED.3IONS-SCNC: 14 MMOL/L (ref 5–15)
BUN BLD-MCNC: 23 MG/DL (ref 8–23)
BUN/CREAT SERPL: 16.7 (ref 7–25)
CALCIUM SPEC-SCNC: 8.7 MG/DL (ref 8.6–10.5)
CHLORIDE SERPL-SCNC: 102 MMOL/L (ref 98–107)
CO2 SERPL-SCNC: 24 MMOL/L (ref 22–29)
CREAT BLD-MCNC: 1.38 MG/DL (ref 0.76–1.27)
GFR SERPL CREATININE-BSD FRML MDRD: 52 ML/MIN/1.73
GLUCOSE BLD-MCNC: 182 MG/DL (ref 65–99)
GLUCOSE BLDC GLUCOMTR-MCNC: 130 MG/DL (ref 70–130)
GLUCOSE BLDC GLUCOMTR-MCNC: 171 MG/DL (ref 70–130)
GLUCOSE BLDC GLUCOMTR-MCNC: 176 MG/DL (ref 70–130)
GLUCOSE BLDC GLUCOMTR-MCNC: 278 MG/DL (ref 70–130)
GLUCOSE BLDC GLUCOMTR-MCNC: 59 MG/DL (ref 70–130)
GLUCOSE BLDC GLUCOMTR-MCNC: 64 MG/DL (ref 70–130)
MAGNESIUM SERPL-MCNC: 2.2 MG/DL (ref 1.6–2.4)
POTASSIUM BLD-SCNC: 4.1 MMOL/L (ref 3.5–5.2)
SODIUM BLD-SCNC: 140 MMOL/L (ref 136–145)

## 2019-07-22 PROCEDURE — C1894 INTRO/SHEATH, NON-LASER: HCPCS

## 2019-07-22 PROCEDURE — 99233 SBSQ HOSP IP/OBS HIGH 50: CPT | Performed by: ORTHOPAEDIC SURGERY

## 2019-07-22 PROCEDURE — 94799 UNLISTED PULMONARY SVC/PX: CPT

## 2019-07-22 PROCEDURE — 05HY33Z INSERTION OF INFUSION DEVICE INTO UPPER VEIN, PERCUTANEOUS APPROACH: ICD-10-PCS | Performed by: INTERNAL MEDICINE

## 2019-07-22 PROCEDURE — C1751 CATH, INF, PER/CENT/MIDLINE: HCPCS

## 2019-07-22 PROCEDURE — 97116 GAIT TRAINING THERAPY: CPT

## 2019-07-22 PROCEDURE — 25010000002 PIPERACILLIN SOD-TAZOBACTAM PER 1 G: Performed by: NURSE PRACTITIONER

## 2019-07-22 PROCEDURE — 25010000002 ENOXAPARIN PER 10 MG: Performed by: INTERNAL MEDICINE

## 2019-07-22 PROCEDURE — 80048 BASIC METABOLIC PNL TOTAL CA: CPT | Performed by: INTERNAL MEDICINE

## 2019-07-22 PROCEDURE — 63710000001 INSULIN DETEMIR PER 5 UNITS: Performed by: INTERNAL MEDICINE

## 2019-07-22 PROCEDURE — 83735 ASSAY OF MAGNESIUM: CPT | Performed by: INTERNAL MEDICINE

## 2019-07-22 PROCEDURE — 82962 GLUCOSE BLOOD TEST: CPT

## 2019-07-22 RX ORDER — SODIUM CHLORIDE 0.9 % (FLUSH) 0.9 %
20 SYRINGE (ML) INJECTION AS NEEDED
Status: DISCONTINUED | OUTPATIENT
Start: 2019-07-22 | End: 2019-07-26 | Stop reason: HOSPADM

## 2019-07-22 RX ORDER — OXYCODONE HYDROCHLORIDE AND ACETAMINOPHEN 5; 325 MG/1; MG/1
1-2 TABLET ORAL EVERY 6 HOURS PRN
Qty: 60 TABLET | Refills: 0 | Status: SHIPPED | OUTPATIENT
Start: 2019-07-22 | End: 2019-08-07

## 2019-07-22 RX ORDER — ONDANSETRON 4 MG/1
4 TABLET, FILM COATED ORAL EVERY 6 HOURS PRN
Qty: 30 TABLET | Refills: 0 | Status: SHIPPED | OUTPATIENT
Start: 2019-07-22

## 2019-07-22 RX ORDER — SODIUM CHLORIDE 0.9 % (FLUSH) 0.9 %
10 SYRINGE (ML) INJECTION AS NEEDED
Status: DISCONTINUED | OUTPATIENT
Start: 2019-07-22 | End: 2019-07-26 | Stop reason: HOSPADM

## 2019-07-22 RX ORDER — FAMOTIDINE 10 MG/ML
20 INJECTION, SOLUTION INTRAVENOUS ONCE
Status: CANCELLED | OUTPATIENT
Start: 2019-07-22 | End: 2019-07-22

## 2019-07-22 RX ORDER — SODIUM CHLORIDE 0.9 % (FLUSH) 0.9 %
10 SYRINGE (ML) INJECTION EVERY 12 HOURS SCHEDULED
Status: DISCONTINUED | OUTPATIENT
Start: 2019-07-22 | End: 2019-07-26 | Stop reason: HOSPADM

## 2019-07-22 RX ORDER — HYDROCODONE BITARTRATE AND ACETAMINOPHEN 7.5; 325 MG/1; MG/1
1-2 TABLET ORAL EVERY 6 HOURS PRN
Qty: 60 TABLET | Refills: 0 | Status: SHIPPED | OUTPATIENT
Start: 2019-07-22

## 2019-07-22 RX ADMIN — DEXTROSE 50 % IN WATER (D50W) INTRAVENOUS SYRINGE 25 G: at 20:54

## 2019-07-22 RX ADMIN — GABAPENTIN 600 MG: 300 CAPSULE ORAL at 08:58

## 2019-07-22 RX ADMIN — POLYETHYLENE GLYCOL 3350 17 G: 17 POWDER, FOR SOLUTION ORAL at 13:33

## 2019-07-22 RX ADMIN — INSULIN LISPRO 5 UNITS: 100 INJECTION, SOLUTION INTRAVENOUS; SUBCUTANEOUS at 17:48

## 2019-07-22 RX ADMIN — INSULIN LISPRO 5 UNITS: 100 INJECTION, SOLUTION INTRAVENOUS; SUBCUTANEOUS at 13:33

## 2019-07-22 RX ADMIN — INSULIN LISPRO 2 UNITS: 100 INJECTION, SOLUTION INTRAVENOUS; SUBCUTANEOUS at 17:48

## 2019-07-22 RX ADMIN — INSULIN LISPRO 5 UNITS: 100 INJECTION, SOLUTION INTRAVENOUS; SUBCUTANEOUS at 08:58

## 2019-07-22 RX ADMIN — TAZOBACTAM SODIUM AND PIPERACILLIN SODIUM 3.38 G: 375; 3 INJECTION, SOLUTION INTRAVENOUS at 13:45

## 2019-07-22 RX ADMIN — ENOXAPARIN SODIUM 40 MG: 40 INJECTION SUBCUTANEOUS at 08:58

## 2019-07-22 RX ADMIN — INSULIN DETEMIR 50 UNITS: 100 INJECTION, SOLUTION SUBCUTANEOUS at 08:59

## 2019-07-22 RX ADMIN — TAZOBACTAM SODIUM AND PIPERACILLIN SODIUM 3.38 G: 375; 3 INJECTION, SOLUTION INTRAVENOUS at 02:15

## 2019-07-22 RX ADMIN — TAZOBACTAM SODIUM AND PIPERACILLIN SODIUM 3.38 G: 375; 3 INJECTION, SOLUTION INTRAVENOUS at 17:48

## 2019-07-22 RX ADMIN — INSULIN LISPRO 6 UNITS: 100 INJECTION, SOLUTION INTRAVENOUS; SUBCUTANEOUS at 13:34

## 2019-07-22 RX ADMIN — GABAPENTIN 600 MG: 300 CAPSULE ORAL at 20:53

## 2019-07-23 ENCOUNTER — ANESTHESIA (OUTPATIENT)
Dept: PERIOP | Facility: HOSPITAL | Age: 64
End: 2019-07-23

## 2019-07-23 PROBLEM — Z98.890 S/P DEBRIDEMENT: Status: ACTIVE | Noted: 2019-07-23

## 2019-07-23 PROBLEM — G89.18 ACUTE POSTOPERATIVE PAIN: Status: ACTIVE | Noted: 2019-07-23

## 2019-07-23 LAB
GLUCOSE BLDC GLUCOMTR-MCNC: 111 MG/DL (ref 70–130)
GLUCOSE BLDC GLUCOMTR-MCNC: 136 MG/DL (ref 70–130)
GLUCOSE BLDC GLUCOMTR-MCNC: 289 MG/DL (ref 70–130)
GLUCOSE BLDC GLUCOMTR-MCNC: 381 MG/DL (ref 70–130)
GLUCOSE BLDC GLUCOMTR-MCNC: 79 MG/DL (ref 70–130)
GLUCOSE BLDC GLUCOMTR-MCNC: 87 MG/DL (ref 70–130)

## 2019-07-23 PROCEDURE — 87176 TISSUE HOMOGENIZATION CULTR: CPT | Performed by: ORTHOPAEDIC SURGERY

## 2019-07-23 PROCEDURE — 28113 PART REMOVAL OF METATARSAL: CPT | Performed by: ORTHOPAEDIC SURGERY

## 2019-07-23 PROCEDURE — 25010000002 DEXAMETHASONE PER 1 MG: Performed by: NURSE ANESTHETIST, CERTIFIED REGISTERED

## 2019-07-23 PROCEDURE — 87070 CULTURE OTHR SPECIMN AEROBIC: CPT | Performed by: ORTHOPAEDIC SURGERY

## 2019-07-23 PROCEDURE — 87186 SC STD MICRODIL/AGAR DIL: CPT | Performed by: ORTHOPAEDIC SURGERY

## 2019-07-23 PROCEDURE — 28122 PARTIAL REMOVAL OF FOOT BONE: CPT | Performed by: ORTHOPAEDIC SURGERY

## 2019-07-23 PROCEDURE — 0QTP0ZZ RESECTION OF LEFT METATARSAL, OPEN APPROACH: ICD-10-PCS | Performed by: ORTHOPAEDIC SURGERY

## 2019-07-23 PROCEDURE — 87102 FUNGUS ISOLATION CULTURE: CPT | Performed by: ORTHOPAEDIC SURGERY

## 2019-07-23 PROCEDURE — 87206 SMEAR FLUORESCENT/ACID STAI: CPT | Performed by: ORTHOPAEDIC SURGERY

## 2019-07-23 PROCEDURE — 93005 ELECTROCARDIOGRAM TRACING: CPT | Performed by: ANESTHESIOLOGY

## 2019-07-23 PROCEDURE — 25010000002 PROPOFOL 10 MG/ML EMULSION: Performed by: NURSE ANESTHETIST, CERTIFIED REGISTERED

## 2019-07-23 PROCEDURE — 97116 GAIT TRAINING THERAPY: CPT

## 2019-07-23 PROCEDURE — 97164 PT RE-EVAL EST PLAN CARE: CPT

## 2019-07-23 PROCEDURE — 25010000002 ONDANSETRON PER 1 MG: Performed by: NURSE ANESTHETIST, CERTIFIED REGISTERED

## 2019-07-23 PROCEDURE — 25010000002 PIPERACILLIN SOD-TAZOBACTAM PER 1 G: Performed by: NURSE PRACTITIONER

## 2019-07-23 PROCEDURE — 87075 CULTR BACTERIA EXCEPT BLOOD: CPT | Performed by: ORTHOPAEDIC SURGERY

## 2019-07-23 PROCEDURE — 88304 TISSUE EXAM BY PATHOLOGIST: CPT | Performed by: ORTHOPAEDIC SURGERY

## 2019-07-23 PROCEDURE — 94799 UNLISTED PULMONARY SVC/PX: CPT

## 2019-07-23 PROCEDURE — 82962 GLUCOSE BLOOD TEST: CPT

## 2019-07-23 PROCEDURE — 25010000002 FENTANYL CITRATE (PF) 100 MCG/2ML SOLUTION: Performed by: NURSE ANESTHETIST, CERTIFIED REGISTERED

## 2019-07-23 PROCEDURE — 25010000002 PHENYLEPHRINE PER 1 ML: Performed by: NURSE ANESTHETIST, CERTIFIED REGISTERED

## 2019-07-23 PROCEDURE — 88311 DECALCIFY TISSUE: CPT | Performed by: ORTHOPAEDIC SURGERY

## 2019-07-23 PROCEDURE — 0LBW0ZZ EXCISION OF LEFT FOOT TENDON, OPEN APPROACH: ICD-10-PCS | Performed by: ORTHOPAEDIC SURGERY

## 2019-07-23 PROCEDURE — 87116 MYCOBACTERIA CULTURE: CPT | Performed by: ORTHOPAEDIC SURGERY

## 2019-07-23 PROCEDURE — 87205 SMEAR GRAM STAIN: CPT | Performed by: ORTHOPAEDIC SURGERY

## 2019-07-23 RX ORDER — FAMOTIDINE 20 MG/1
20 TABLET, FILM COATED ORAL ONCE
Status: COMPLETED | OUTPATIENT
Start: 2019-07-23 | End: 2019-07-23

## 2019-07-23 RX ORDER — HYDROCODONE BITARTRATE AND ACETAMINOPHEN 7.5; 325 MG/1; MG/1
2 TABLET ORAL EVERY 4 HOURS PRN
Status: DISCONTINUED | OUTPATIENT
Start: 2019-07-23 | End: 2019-07-26 | Stop reason: HOSPADM

## 2019-07-23 RX ORDER — ONDANSETRON 2 MG/ML
4 INJECTION INTRAMUSCULAR; INTRAVENOUS ONCE AS NEEDED
Status: DISCONTINUED | OUTPATIENT
Start: 2019-07-23 | End: 2019-07-23 | Stop reason: HOSPADM

## 2019-07-23 RX ORDER — OXYCODONE HYDROCHLORIDE AND ACETAMINOPHEN 5; 325 MG/1; MG/1
2 TABLET ORAL EVERY 4 HOURS PRN
Status: DISCONTINUED | OUTPATIENT
Start: 2019-07-23 | End: 2019-07-26 | Stop reason: HOSPADM

## 2019-07-23 RX ORDER — HYDROCODONE BITARTRATE AND ACETAMINOPHEN 7.5; 325 MG/1; MG/1
1 TABLET ORAL EVERY 4 HOURS PRN
Status: DISCONTINUED | OUTPATIENT
Start: 2019-07-23 | End: 2019-07-26 | Stop reason: HOSPADM

## 2019-07-23 RX ORDER — SODIUM CHLORIDE AND POTASSIUM CHLORIDE 150; 450 MG/100ML; MG/100ML
50 INJECTION, SOLUTION INTRAVENOUS CONTINUOUS
Status: DISCONTINUED | OUTPATIENT
Start: 2019-07-23 | End: 2019-07-25

## 2019-07-23 RX ORDER — LIDOCAINE HYDROCHLORIDE 10 MG/ML
INJECTION, SOLUTION EPIDURAL; INFILTRATION; INTRACAUDAL; PERINEURAL AS NEEDED
Status: DISCONTINUED | OUTPATIENT
Start: 2019-07-23 | End: 2019-07-23 | Stop reason: SURG

## 2019-07-23 RX ORDER — DIPHENHYDRAMINE HCL 25 MG
25 CAPSULE ORAL NIGHTLY PRN
Status: DISCONTINUED | OUTPATIENT
Start: 2019-07-23 | End: 2019-07-26 | Stop reason: HOSPADM

## 2019-07-23 RX ORDER — DEXAMETHASONE SODIUM PHOSPHATE 4 MG/ML
INJECTION, SOLUTION INTRA-ARTICULAR; INTRALESIONAL; INTRAMUSCULAR; INTRAVENOUS; SOFT TISSUE AS NEEDED
Status: DISCONTINUED | OUTPATIENT
Start: 2019-07-23 | End: 2019-07-23 | Stop reason: SURG

## 2019-07-23 RX ORDER — BISACODYL 10 MG
10 SUPPOSITORY, RECTAL RECTAL DAILY PRN
Status: DISCONTINUED | OUTPATIENT
Start: 2019-07-23 | End: 2019-07-26 | Stop reason: HOSPADM

## 2019-07-23 RX ORDER — FENTANYL CITRATE 50 UG/ML
50 INJECTION, SOLUTION INTRAMUSCULAR; INTRAVENOUS
Status: DISCONTINUED | OUTPATIENT
Start: 2019-07-23 | End: 2019-07-23 | Stop reason: HOSPADM

## 2019-07-23 RX ORDER — LIDOCAINE HYDROCHLORIDE 10 MG/ML
0.5 INJECTION, SOLUTION EPIDURAL; INFILTRATION; INTRACAUDAL; PERINEURAL ONCE AS NEEDED
Status: DISCONTINUED | OUTPATIENT
Start: 2019-07-23 | End: 2019-07-23 | Stop reason: HOSPADM

## 2019-07-23 RX ORDER — FENTANYL CITRATE 50 UG/ML
INJECTION, SOLUTION INTRAMUSCULAR; INTRAVENOUS AS NEEDED
Status: DISCONTINUED | OUTPATIENT
Start: 2019-07-23 | End: 2019-07-23 | Stop reason: SURG

## 2019-07-23 RX ORDER — SODIUM CHLORIDE 0.9 % (FLUSH) 0.9 %
3 SYRINGE (ML) INJECTION EVERY 12 HOURS SCHEDULED
Status: DISCONTINUED | OUTPATIENT
Start: 2019-07-23 | End: 2019-07-23 | Stop reason: HOSPADM

## 2019-07-23 RX ORDER — DIPHENOXYLATE HYDROCHLORIDE AND ATROPINE SULFATE 2.5; .025 MG/1; MG/1
1 TABLET ORAL DAILY
Status: DISCONTINUED | OUTPATIENT
Start: 2019-07-23 | End: 2019-07-26 | Stop reason: HOSPADM

## 2019-07-23 RX ORDER — PROPOFOL 10 MG/ML
VIAL (ML) INTRAVENOUS AS NEEDED
Status: DISCONTINUED | OUTPATIENT
Start: 2019-07-23 | End: 2019-07-23 | Stop reason: SURG

## 2019-07-23 RX ORDER — SODIUM CHLORIDE 0.9 % (FLUSH) 0.9 %
3-10 SYRINGE (ML) INJECTION AS NEEDED
Status: DISCONTINUED | OUTPATIENT
Start: 2019-07-23 | End: 2019-07-23 | Stop reason: HOSPADM

## 2019-07-23 RX ORDER — OXYCODONE HYDROCHLORIDE AND ACETAMINOPHEN 5; 325 MG/1; MG/1
1 TABLET ORAL EVERY 4 HOURS PRN
Status: DISCONTINUED | OUTPATIENT
Start: 2019-07-23 | End: 2019-07-26 | Stop reason: HOSPADM

## 2019-07-23 RX ORDER — LABETALOL HYDROCHLORIDE 5 MG/ML
10 INJECTION, SOLUTION INTRAVENOUS EVERY 4 HOURS PRN
Status: DISCONTINUED | OUTPATIENT
Start: 2019-07-23 | End: 2019-07-26 | Stop reason: HOSPADM

## 2019-07-23 RX ORDER — NALOXONE HCL 0.4 MG/ML
0.4 VIAL (ML) INJECTION
Status: DISCONTINUED | OUTPATIENT
Start: 2019-07-23 | End: 2019-07-26 | Stop reason: HOSPADM

## 2019-07-23 RX ORDER — SODIUM CHLORIDE, SODIUM LACTATE, POTASSIUM CHLORIDE, CALCIUM CHLORIDE 600; 310; 30; 20 MG/100ML; MG/100ML; MG/100ML; MG/100ML
9 INJECTION, SOLUTION INTRAVENOUS CONTINUOUS
Status: DISCONTINUED | OUTPATIENT
Start: 2019-07-23 | End: 2019-07-26

## 2019-07-23 RX ORDER — ONDANSETRON 2 MG/ML
INJECTION INTRAMUSCULAR; INTRAVENOUS AS NEEDED
Status: DISCONTINUED | OUTPATIENT
Start: 2019-07-23 | End: 2019-07-23

## 2019-07-23 RX ORDER — DIPHENHYDRAMINE HYDROCHLORIDE 50 MG/ML
25 INJECTION INTRAMUSCULAR; INTRAVENOUS NIGHTLY PRN
Status: DISCONTINUED | OUTPATIENT
Start: 2019-07-23 | End: 2019-07-26 | Stop reason: HOSPADM

## 2019-07-23 RX ADMIN — GABAPENTIN 600 MG: 300 CAPSULE ORAL at 20:36

## 2019-07-23 RX ADMIN — DEXAMETHASONE SODIUM PHOSPHATE 4 MG: 4 INJECTION, SOLUTION INTRAMUSCULAR; INTRAVENOUS at 12:09

## 2019-07-23 RX ADMIN — HYDROCODONE BITARTRATE AND ACETAMINOPHEN 1 TABLET: 7.5; 325 TABLET ORAL at 19:22

## 2019-07-23 RX ADMIN — ONDANSETRON 4 MG: 2 INJECTION INTRAMUSCULAR; INTRAVENOUS at 12:09

## 2019-07-23 RX ADMIN — FENTANYL CITRATE 50 MCG: 50 INJECTION, SOLUTION INTRAMUSCULAR; INTRAVENOUS at 12:00

## 2019-07-23 RX ADMIN — PROPOFOL 200 MG: 10 INJECTION, EMULSION INTRAVENOUS at 12:00

## 2019-07-23 RX ADMIN — FAMOTIDINE 20 MG: 20 TABLET ORAL at 10:45

## 2019-07-23 RX ADMIN — SODIUM CHLORIDE, POTASSIUM CHLORIDE, SODIUM LACTATE AND CALCIUM CHLORIDE: 600; 310; 30; 20 INJECTION, SOLUTION INTRAVENOUS at 11:54

## 2019-07-23 RX ADMIN — TAZOBACTAM SODIUM AND PIPERACILLIN SODIUM 3.38 G: 375; 3 INJECTION, SOLUTION INTRAVENOUS at 02:50

## 2019-07-23 RX ADMIN — INSULIN LISPRO 5 UNITS: 100 INJECTION, SOLUTION INTRAVENOUS; SUBCUTANEOUS at 17:44

## 2019-07-23 RX ADMIN — INSULIN LISPRO 6 UNITS: 100 INJECTION, SOLUTION INTRAVENOUS; SUBCUTANEOUS at 17:43

## 2019-07-23 RX ADMIN — TAZOBACTAM SODIUM AND PIPERACILLIN SODIUM 3.38 G: 375; 3 INJECTION, SOLUTION INTRAVENOUS at 17:44

## 2019-07-23 RX ADMIN — SODIUM CHLORIDE, PRESERVATIVE FREE 10 ML: 5 INJECTION INTRAVENOUS at 07:53

## 2019-07-23 RX ADMIN — Medication 1 TABLET: at 16:02

## 2019-07-23 RX ADMIN — PHENYLEPHRINE HYDROCHLORIDE 100 MCG: 10 INJECTION INTRAVENOUS at 12:32

## 2019-07-23 RX ADMIN — LIDOCAINE HYDROCHLORIDE 50 MG: 10 INJECTION, SOLUTION EPIDURAL; INFILTRATION; INTRACAUDAL; PERINEURAL at 12:00

## 2019-07-23 RX ADMIN — FENTANYL CITRATE 50 MCG: 50 INJECTION, SOLUTION INTRAMUSCULAR; INTRAVENOUS at 12:59

## 2019-07-23 RX ADMIN — INSULIN LISPRO 8 UNITS: 100 INJECTION, SOLUTION INTRAVENOUS; SUBCUTANEOUS at 20:50

## 2019-07-23 RX ADMIN — SODIUM CHLORIDE, POTASSIUM CHLORIDE, SODIUM LACTATE AND CALCIUM CHLORIDE 9 ML/HR: 600; 310; 30; 20 INJECTION, SOLUTION INTRAVENOUS at 10:40

## 2019-07-23 RX ADMIN — POTASSIUM CHLORIDE AND SODIUM CHLORIDE 50 ML/HR: 450; 150 INJECTION, SOLUTION INTRAVENOUS at 14:06

## 2019-07-23 RX ADMIN — HYDROCODONE BITARTRATE AND ACETAMINOPHEN 1 TABLET: 7.5; 325 TABLET ORAL at 14:17

## 2019-07-23 RX ADMIN — TAZOBACTAM SODIUM AND PIPERACILLIN SODIUM 3.38 G: 375; 3 INJECTION, SOLUTION INTRAVENOUS at 10:42

## 2019-07-23 RX ADMIN — EPHEDRINE SULFATE 15 MG: 50 INJECTION INTRAMUSCULAR; INTRAVENOUS; SUBCUTANEOUS at 12:06

## 2019-07-23 NOTE — ANESTHESIA POSTPROCEDURE EVALUATION
Patient: Scott Benz    Procedure Summary     Date:  07/23/19 Room / Location:   [facility] 14 /  [facility]    Anesthesia Start:  1154 Anesthesia Stop:  1307    Procedures:       excision left 4th metatarsal, I&D left foot wound (Left Foot)      excision left 4th metatarsal (Left Foot) Diagnosis:       Impaired functional mobility, balance, gait, and endurance      Uncontrolled type 2 diabetes mellitus with hyperglycemia (CMS/HCC)      Diabetic polyneuropathy associated with type 2 diabetes mellitus (CMS/HCC)      Chronic multifocal osteomyelitis of left foot (CMS/HCC)      Osteomyelitis, acute (CMS/HCC)      Decreased pulses in feet      (Impaired functional mobility, balance, gait, and endurance [Z74.09])      (Uncontrolled type 2 diabetes mellitus with hyperglycemia (CMS/HCC) [E11.65])      (Diabetic polyneuropathy associated with type 2 diabetes mellitus (CMS/HCC) [E11.42])      (Chronic multifocal osteomyelitis of left foot (CMS/HCC) [M86.372])      (Osteomyelitis, acute (CMS/HCC) [M86.10])      (Decreased pulses in feet [R09.89])    Surgeon:  Shannan Kraft MD Provider:  Loy Campos MD    Anesthesia Type:  general ASA Status:  3          Anesthesia Type: general  Last vitals  BP   123/78   Temp   98.7   Pulse   99   Resp   16     SpO2   92     Post Anesthesia Care and Evaluation    Patient location during evaluation: PACU  Patient participation: complete - patient participated  Level of consciousness: sleepy but conscious  Pain score: 0  Pain management: adequate  Airway patency: patent  Anesthetic complications: No anesthetic complications  PONV Status: none  Cardiovascular status: hemodynamically stable and acceptable  Respiratory status: nonlabored ventilation, acceptable, nasal cannula and oral airway  Hydration status: acceptable

## 2019-07-23 NOTE — ANESTHESIA PREPROCEDURE EVALUATION
Anesthesia Evaluation     Patient summary reviewed and Nursing notes reviewed   no history of anesthetic complications:  NPO Solid Status: > 8 hours  NPO Liquid Status: > 2 hours           Airway   Mallampati: II  TM distance: >3 FB  Neck ROM: full  No difficulty expected  Dental - normal exam     Pulmonary - negative pulmonary ROS and normal exam    breath sounds clear to auscultation  Cardiovascular - negative cardio ROS and normal exam    Rhythm: regular  Rate: normal        Neuro/Psych  (+) numbness (diabetic peripheral neuropathy),     GI/Hepatic/Renal/Endo    (+)   diabetes mellitus type 2 poorly controlled using insulin,     Musculoskeletal         ROS comment: Diabetic ulcer left foot  Abdominal    Substance History      OB/GYN          Other                        Anesthesia Plan    ASA 3     general     intravenous induction   Anesthetic plan, all risks, benefits, and alternatives have been provided, discussed and informed consent has been obtained with: patient.    Plan discussed with CRNA.

## 2019-07-23 NOTE — ANESTHESIA PROCEDURE NOTES
Airway  Urgency: elective    Airway not difficult    General Information and Staff    Patient location during procedure: OR  CRNA: Lesly Poole CRNA    Indications and Patient Condition  Indications for airway management: airway protection    Preoxygenated: yes  Mask difficulty assessment: 1 - vent by mask    Final Airway Details  Final airway type: supraglottic airway      Successful airway: I-gel  Size 4    Number of attempts at approach: 1    Additional Comments  LMA placed without difficulty, ventilation with assist, equal breath sounds and symmetric chest rise and fall

## 2019-07-24 LAB
ANION GAP SERPL CALCULATED.3IONS-SCNC: 13 MMOL/L (ref 5–15)
BACTERIA SPEC AEROBE CULT: NORMAL
BACTERIA SPEC AEROBE CULT: NORMAL
BUN BLD-MCNC: 23 MG/DL (ref 8–23)
BUN/CREAT SERPL: 17.4 (ref 7–25)
CALCIUM SPEC-SCNC: 8.2 MG/DL (ref 8.6–10.5)
CHLORIDE SERPL-SCNC: 99 MMOL/L (ref 98–107)
CO2 SERPL-SCNC: 23 MMOL/L (ref 22–29)
CREAT BLD-MCNC: 1.32 MG/DL (ref 0.76–1.27)
DEPRECATED RDW RBC AUTO: 43.3 FL (ref 37–54)
ERYTHROCYTE [DISTWIDTH] IN BLOOD BY AUTOMATED COUNT: 12.6 % (ref 12.3–15.4)
GFR SERPL CREATININE-BSD FRML MDRD: 55 ML/MIN/1.73
GLUCOSE BLD-MCNC: 259 MG/DL (ref 65–99)
GLUCOSE BLDC GLUCOMTR-MCNC: 181 MG/DL (ref 70–130)
GLUCOSE BLDC GLUCOMTR-MCNC: 254 MG/DL (ref 70–130)
GLUCOSE BLDC GLUCOMTR-MCNC: 264 MG/DL (ref 70–130)
GLUCOSE BLDC GLUCOMTR-MCNC: 62 MG/DL (ref 70–130)
GLUCOSE BLDC GLUCOMTR-MCNC: 86 MG/DL (ref 70–130)
HCT VFR BLD AUTO: 32.8 % (ref 37.5–51)
HGB BLD-MCNC: 10.4 G/DL (ref 13–17.7)
MCH RBC QN AUTO: 29.3 PG (ref 26.6–33)
MCHC RBC AUTO-ENTMCNC: 31.7 G/DL (ref 31.5–35.7)
MCV RBC AUTO: 92.4 FL (ref 79–97)
PLATELET # BLD AUTO: 384 10*3/MM3 (ref 140–450)
PMV BLD AUTO: 9 FL (ref 6–12)
POTASSIUM BLD-SCNC: 4.3 MMOL/L (ref 3.5–5.2)
RBC # BLD AUTO: 3.55 10*6/MM3 (ref 4.14–5.8)
SODIUM BLD-SCNC: 135 MMOL/L (ref 136–145)
WBC NRBC COR # BLD: 6.02 10*3/MM3 (ref 3.4–10.8)

## 2019-07-24 PROCEDURE — 99024 POSTOP FOLLOW-UP VISIT: CPT | Performed by: ORTHOPAEDIC SURGERY

## 2019-07-24 PROCEDURE — 82962 GLUCOSE BLOOD TEST: CPT

## 2019-07-24 PROCEDURE — 80048 BASIC METABOLIC PNL TOTAL CA: CPT | Performed by: ORTHOPAEDIC SURGERY

## 2019-07-24 PROCEDURE — 63710000001 INSULIN DETEMIR PER 5 UNITS: Performed by: INTERNAL MEDICINE

## 2019-07-24 PROCEDURE — 25010000002 ENOXAPARIN PER 10 MG: Performed by: ORTHOPAEDIC SURGERY

## 2019-07-24 PROCEDURE — 85027 COMPLETE CBC AUTOMATED: CPT | Performed by: ORTHOPAEDIC SURGERY

## 2019-07-24 PROCEDURE — 25010000002 PIPERACILLIN SOD-TAZOBACTAM PER 1 G: Performed by: NURSE PRACTITIONER

## 2019-07-24 PROCEDURE — 97116 GAIT TRAINING THERAPY: CPT

## 2019-07-24 RX ADMIN — MAGNESIUM HYDROXIDE 10 ML: 2400 SUSPENSION ORAL at 21:12

## 2019-07-24 RX ADMIN — INSULIN LISPRO 5 UNITS: 100 INJECTION, SOLUTION INTRAVENOUS; SUBCUTANEOUS at 18:44

## 2019-07-24 RX ADMIN — INSULIN LISPRO 5 UNITS: 100 INJECTION, SOLUTION INTRAVENOUS; SUBCUTANEOUS at 08:34

## 2019-07-24 RX ADMIN — OXYCODONE HYDROCHLORIDE AND ACETAMINOPHEN 1 TABLET: 5; 325 TABLET ORAL at 08:46

## 2019-07-24 RX ADMIN — GABAPENTIN 600 MG: 300 CAPSULE ORAL at 21:04

## 2019-07-24 RX ADMIN — INSULIN LISPRO 4 UNITS: 100 INJECTION, SOLUTION INTRAVENOUS; SUBCUTANEOUS at 08:34

## 2019-07-24 RX ADMIN — INSULIN LISPRO 2 UNITS: 100 INJECTION, SOLUTION INTRAVENOUS; SUBCUTANEOUS at 11:59

## 2019-07-24 RX ADMIN — GABAPENTIN 600 MG: 300 CAPSULE ORAL at 08:32

## 2019-07-24 RX ADMIN — ENOXAPARIN SODIUM 40 MG: 40 INJECTION SUBCUTANEOUS at 08:32

## 2019-07-24 RX ADMIN — INSULIN LISPRO 5 UNITS: 100 INJECTION, SOLUTION INTRAVENOUS; SUBCUTANEOUS at 11:58

## 2019-07-24 RX ADMIN — TAZOBACTAM SODIUM AND PIPERACILLIN SODIUM 3.38 G: 375; 3 INJECTION, SOLUTION INTRAVENOUS at 02:28

## 2019-07-24 RX ADMIN — INSULIN DETEMIR 50 UNITS: 100 INJECTION, SOLUTION SUBCUTANEOUS at 08:36

## 2019-07-24 RX ADMIN — TAZOBACTAM SODIUM AND PIPERACILLIN SODIUM 3.38 G: 375; 3 INJECTION, SOLUTION INTRAVENOUS at 08:46

## 2019-07-24 RX ADMIN — SODIUM CHLORIDE, PRESERVATIVE FREE 3 ML: 5 INJECTION INTRAVENOUS at 08:38

## 2019-07-24 RX ADMIN — TAZOBACTAM SODIUM AND PIPERACILLIN SODIUM 3.38 G: 375; 3 INJECTION, SOLUTION INTRAVENOUS at 18:46

## 2019-07-24 RX ADMIN — Medication 1 TABLET: at 08:32

## 2019-07-25 PROBLEM — D62 ACUTE BLOOD LOSS ANEMIA: Status: ACTIVE | Noted: 2019-07-25

## 2019-07-25 LAB
CYTO UR: NORMAL
GLUCOSE BLDC GLUCOMTR-MCNC: 107 MG/DL (ref 70–130)
GLUCOSE BLDC GLUCOMTR-MCNC: 164 MG/DL (ref 70–130)
GLUCOSE BLDC GLUCOMTR-MCNC: 283 MG/DL (ref 70–130)
GLUCOSE BLDC GLUCOMTR-MCNC: 73 MG/DL (ref 70–130)
LAB AP CASE REPORT: NORMAL
LAB AP CLINICAL INFORMATION: NORMAL
LAB AP DIAGNOSIS COMMENT: NORMAL
PATH REPORT.FINAL DX SPEC: NORMAL
PATH REPORT.GROSS SPEC: NORMAL

## 2019-07-25 PROCEDURE — 82962 GLUCOSE BLOOD TEST: CPT

## 2019-07-25 PROCEDURE — 99024 POSTOP FOLLOW-UP VISIT: CPT | Performed by: ORTHOPAEDIC SURGERY

## 2019-07-25 PROCEDURE — 25010000002 ENOXAPARIN PER 10 MG: Performed by: ORTHOPAEDIC SURGERY

## 2019-07-25 PROCEDURE — 63710000001 INSULIN DETEMIR PER 5 UNITS: Performed by: INTERNAL MEDICINE

## 2019-07-25 PROCEDURE — 25010000002 PIPERACILLIN SOD-TAZOBACTAM PER 1 G: Performed by: NURSE PRACTITIONER

## 2019-07-25 RX ORDER — LISINOPRIL 10 MG/1
10 TABLET ORAL DAILY
Status: DISCONTINUED | OUTPATIENT
Start: 2019-07-25 | End: 2019-07-26 | Stop reason: HOSPADM

## 2019-07-25 RX ORDER — LISINOPRIL 10 MG/1
10 TABLET ORAL ONCE
Status: DISCONTINUED | OUTPATIENT
Start: 2019-07-25 | End: 2019-07-25

## 2019-07-25 RX ADMIN — LABETALOL 20 MG/4 ML (5 MG/ML) INTRAVENOUS SYRINGE 10 MG: at 18:07

## 2019-07-25 RX ADMIN — INSULIN LISPRO 6 UNITS: 100 INJECTION, SOLUTION INTRAVENOUS; SUBCUTANEOUS at 12:06

## 2019-07-25 RX ADMIN — GABAPENTIN 600 MG: 300 CAPSULE ORAL at 08:38

## 2019-07-25 RX ADMIN — TAZOBACTAM SODIUM AND PIPERACILLIN SODIUM 3.38 G: 375; 3 INJECTION, SOLUTION INTRAVENOUS at 03:13

## 2019-07-25 RX ADMIN — TAZOBACTAM SODIUM AND PIPERACILLIN SODIUM 3.38 G: 375; 3 INJECTION, SOLUTION INTRAVENOUS at 20:06

## 2019-07-25 RX ADMIN — INSULIN LISPRO 2 UNITS: 100 INJECTION, SOLUTION INTRAVENOUS; SUBCUTANEOUS at 19:45

## 2019-07-25 RX ADMIN — HYDROCODONE BITARTRATE AND ACETAMINOPHEN 1 TABLET: 7.5; 325 TABLET ORAL at 01:53

## 2019-07-25 RX ADMIN — INSULIN DETEMIR 28 UNITS: 100 INJECTION, SOLUTION SUBCUTANEOUS at 19:45

## 2019-07-25 RX ADMIN — ACETAMINOPHEN 650 MG: 325 TABLET, FILM COATED ORAL at 08:44

## 2019-07-25 RX ADMIN — TAZOBACTAM SODIUM AND PIPERACILLIN SODIUM 3.38 G: 375; 3 INJECTION, SOLUTION INTRAVENOUS at 10:59

## 2019-07-25 RX ADMIN — INSULIN LISPRO 5 UNITS: 100 INJECTION, SOLUTION INTRAVENOUS; SUBCUTANEOUS at 12:06

## 2019-07-25 RX ADMIN — LISINOPRIL 10 MG: 10 TABLET ORAL at 19:45

## 2019-07-25 RX ADMIN — INSULIN DETEMIR 28 UNITS: 100 INJECTION, SOLUTION SUBCUTANEOUS at 08:38

## 2019-07-25 RX ADMIN — Medication 1 TABLET: at 08:38

## 2019-07-25 RX ADMIN — GABAPENTIN 600 MG: 300 CAPSULE ORAL at 19:45

## 2019-07-25 RX ADMIN — ENOXAPARIN SODIUM 40 MG: 40 INJECTION SUBCUTANEOUS at 08:38

## 2019-07-26 VITALS
OXYGEN SATURATION: 97 % | TEMPERATURE: 98.7 F | HEIGHT: 68 IN | DIASTOLIC BLOOD PRESSURE: 73 MMHG | WEIGHT: 170 LBS | SYSTOLIC BLOOD PRESSURE: 123 MMHG | HEART RATE: 88 BPM | RESPIRATION RATE: 16 BRPM | BODY MASS INDEX: 25.76 KG/M2

## 2019-07-26 LAB
BACTERIA SPEC AEROBE CULT: ABNORMAL
CK SERPL-CCNC: 23 U/L (ref 20–200)
GLUCOSE BLDC GLUCOMTR-MCNC: 153 MG/DL (ref 70–130)
GLUCOSE BLDC GLUCOMTR-MCNC: 67 MG/DL (ref 70–130)
GRAM STN SPEC: ABNORMAL

## 2019-07-26 PROCEDURE — 82962 GLUCOSE BLOOD TEST: CPT

## 2019-07-26 PROCEDURE — 25010000002 PIPERACILLIN SOD-TAZOBACTAM PER 1 G: Performed by: NURSE PRACTITIONER

## 2019-07-26 PROCEDURE — 97116 GAIT TRAINING THERAPY: CPT

## 2019-07-26 PROCEDURE — 82550 ASSAY OF CK (CPK): CPT | Performed by: INTERNAL MEDICINE

## 2019-07-26 PROCEDURE — 25010000002 DAPTOMYCIN PER 1 MG: Performed by: INTERNAL MEDICINE

## 2019-07-26 PROCEDURE — 25010000002 ENOXAPARIN PER 10 MG: Performed by: ORTHOPAEDIC SURGERY

## 2019-07-26 PROCEDURE — 99024 POSTOP FOLLOW-UP VISIT: CPT | Performed by: ORTHOPAEDIC SURGERY

## 2019-07-26 PROCEDURE — 25010000002 CEFTRIAXONE PER 250 MG

## 2019-07-26 PROCEDURE — 63710000001 INSULIN DETEMIR PER 5 UNITS: Performed by: INTERNAL MEDICINE

## 2019-07-26 RX ORDER — PSEUDOEPHEDRINE HCL 30 MG
100 TABLET ORAL 2 TIMES DAILY PRN
Qty: 60 EACH | Refills: 0 | Status: SHIPPED | OUTPATIENT
Start: 2019-07-26

## 2019-07-26 RX ORDER — METRONIDAZOLE 500 MG/1
500 TABLET ORAL EVERY 8 HOURS SCHEDULED
Status: DISCONTINUED | OUTPATIENT
Start: 2019-07-26 | End: 2019-07-26 | Stop reason: HOSPADM

## 2019-07-26 RX ORDER — LISINOPRIL 10 MG/1
10 TABLET ORAL DAILY
Qty: 30 TABLET | Refills: 0 | Status: SHIPPED | OUTPATIENT
Start: 2019-07-27

## 2019-07-26 RX ORDER — METRONIDAZOLE 500 MG/1
500 TABLET ORAL EVERY 8 HOURS SCHEDULED
Qty: 90 TABLET | Refills: 0 | Status: SHIPPED | OUTPATIENT
Start: 2019-07-26 | End: 2019-08-25

## 2019-07-26 RX ADMIN — LISINOPRIL 10 MG: 10 TABLET ORAL at 08:14

## 2019-07-26 RX ADMIN — INSULIN DETEMIR 28 UNITS: 100 INJECTION, SOLUTION SUBCUTANEOUS at 08:16

## 2019-07-26 RX ADMIN — DAPTOMYCIN 600 MG: 500 INJECTION, POWDER, LYOPHILIZED, FOR SOLUTION INTRAVENOUS at 14:16

## 2019-07-26 RX ADMIN — ENOXAPARIN SODIUM 40 MG: 40 INJECTION SUBCUTANEOUS at 08:13

## 2019-07-26 RX ADMIN — CEFTRIAXONE SODIUM 2 G: 2 INJECTION, POWDER, FOR SOLUTION INTRAMUSCULAR; INTRAVENOUS at 12:40

## 2019-07-26 RX ADMIN — GABAPENTIN 600 MG: 300 CAPSULE ORAL at 08:14

## 2019-07-26 RX ADMIN — METRONIDAZOLE 500 MG: 500 TABLET ORAL at 14:16

## 2019-07-26 RX ADMIN — TAZOBACTAM SODIUM AND PIPERACILLIN SODIUM 3.38 G: 375; 3 INJECTION, SOLUTION INTRAVENOUS at 02:23

## 2019-07-26 RX ADMIN — Medication 1 TABLET: at 08:14

## 2019-07-26 RX ADMIN — TAZOBACTAM SODIUM AND PIPERACILLIN SODIUM 3.38 G: 375; 3 INJECTION, SOLUTION INTRAVENOUS at 08:14

## 2019-07-27 ENCOUNTER — READMISSION MANAGEMENT (OUTPATIENT)
Dept: CALL CENTER | Facility: HOSPITAL | Age: 64
End: 2019-07-27

## 2019-07-27 NOTE — OUTREACH NOTE
Prep Survey      Responses   Facility patient discharged from?  [place]   Is patient eligible?  Yes   Discharge diagnosis  S/P Irrigation debridement necrotic tissue left fourth webspace and fifth webspace and excision fourth metatarsal, osteomyelitis acte, Uncontrolled DM II with hyperglycemia, Diabetic polyneuropathy, osteomyelitis left foot, decreased pulses in feet, iimpaired functional mobility, balance,gait and endurance,    Does the patient have one of the following disease processes/diagnoses(primary or secondary)?  General Surgery   Does the patient have Home health ordered?  Yes   What is the Home health agency?   [facility]   Is there a DME ordered?  Yes   What DME was ordered?  Knee walker from [facility]   Comments regarding appointments  See AVS   Prep survey completed?  Yes          Tenisha Arcos RN

## 2019-07-28 ENCOUNTER — READMISSION MANAGEMENT (OUTPATIENT)
Dept: CALL CENTER | Facility: HOSPITAL | Age: 64
End: 2019-07-28

## 2019-07-28 LAB — BACTERIA SPEC ANAEROBE CULT: NORMAL

## 2019-07-28 NOTE — OUTREACH NOTE
General Surgery Week 1 Survey      Responses   Facility patient discharged from?  [place]   Does the patient have one of the following disease processes/diagnoses(primary or secondary)?  General Surgery   Is there a successful TCM telephone encounter documented?  No   Week 1 attempt successful?  Yes   Call start time  1721   Call end time  1724   Discharge diagnosis  S/P Irrigation debridement necrotic tissue left fourth webspace and fifth webspace and excision fourth metatarsal, osteomyelitis acte, Uncontrolled DM II with hyperglycemia, Diabetic polyneuropathy, osteomyelitis left foot, decreased pulses in feet, iimpaired functional mobility, balance,gait and endurance,    Is patient permission given to speak with other caregiver?  Yes   List who call center can speak with  [facility] reviewed with patient/caregiver?  Yes   Is the patient having any side effects they believe may be caused by any medication additions or changes?  No   Does the patient have all medications related to this admission filled (includes all antibiotics, pain medications, etc.)  Yes   Is the patient taking all medications as directed (includes completed medication regime)?  Yes   Does the patient have a follow up appointment scheduled with their surgeon?  Yes   Has the patient kept scheduled appointments due by today?  N/A   Has home health visited the patient within 72 hours of discharge?  Yes   What DME was ordered?  Knee walker from [facility]   Has all DME been delivered?  Yes   Psychosocial issues?  No   Did the patient receive a copy of their discharge instructions?  Yes   Nursing interventions  Reviewed instructions with patient   What is the patient's perception of their health status since discharge?  Improving   Nursing interventions  Nurse provided patient education   Is the patient /caregiver able to teach back basic post-op care?  Continue use of incentive spirometry at least 1 week post discharge, Practice 'cough and  deep breath'   Is the patient/caregiver able to teach back signs and symptoms of incisional infection?  Increased redness, swelling or pain at the incisonal site, Increased drainage or bleeding, Incisional warmth, Pus or odor from incision, Fever   Is the patient/caregiver able to teach back steps to recovery at home?  Set small, achievable goals for return to baseline health, Rest and rebuild strength, gradually increase activity   Is the patient/caregiver able to teach back the hierarchy of who to call/visit for symptoms/problems? PCP, Specialist, Home health nurse, Urgent Care, ED, 911  Yes   Week 1 call completed?  Yes          Jaqui Desai RN

## 2019-07-30 ENCOUNTER — LAB REQUISITION (OUTPATIENT)
Dept: LAB | Facility: HOSPITAL | Age: 64
End: 2019-07-30

## 2019-07-30 DIAGNOSIS — Z00.00 ROUTINE GENERAL MEDICAL EXAMINATION AT A HEALTH CARE FACILITY: ICD-10-CM

## 2019-07-30 LAB
ALBUMIN SERPL-MCNC: 3.5 G/DL (ref 3.5–5.2)
ALBUMIN/GLOB SERPL: 0.9 G/DL
ALP SERPL-CCNC: 74 U/L (ref 39–117)
ALT SERPL W P-5'-P-CCNC: 14 U/L (ref 1–41)
ANION GAP SERPL CALCULATED.3IONS-SCNC: 17 MMOL/L (ref 5–15)
AST SERPL-CCNC: 23 U/L (ref 1–40)
BASOPHILS # BLD AUTO: 0.13 10*3/MM3 (ref 0–0.2)
BASOPHILS NFR BLD AUTO: 1.6 % (ref 0–1.5)
BILIRUB SERPL-MCNC: 0.3 MG/DL (ref 0.2–1.2)
BUN BLD-MCNC: 20 MG/DL (ref 8–23)
BUN/CREAT SERPL: 15 (ref 7–25)
CALCIUM SPEC-SCNC: 9.4 MG/DL (ref 8.6–10.5)
CHLORIDE SERPL-SCNC: 99 MMOL/L (ref 98–107)
CK SERPL-CCNC: 76 U/L (ref 20–200)
CO2 SERPL-SCNC: 23 MMOL/L (ref 22–29)
CREAT BLD-MCNC: 1.33 MG/DL (ref 0.76–1.27)
CRP SERPL-MCNC: 2.54 MG/DL (ref 0–0.5)
DEPRECATED RDW RBC AUTO: 44.3 FL (ref 37–54)
EOSINOPHIL # BLD AUTO: 0.95 10*3/MM3 (ref 0–0.4)
EOSINOPHIL NFR BLD AUTO: 11.7 % (ref 0.3–6.2)
ERYTHROCYTE [DISTWIDTH] IN BLOOD BY AUTOMATED COUNT: 13.2 % (ref 12.3–15.4)
ERYTHROCYTE [SEDIMENTATION RATE] IN BLOOD: 92 MM/HR (ref 0–20)
GFR SERPL CREATININE-BSD FRML MDRD: 54 ML/MIN/1.73
GLOBULIN UR ELPH-MCNC: 4.1 GM/DL
GLUCOSE BLD-MCNC: 137 MG/DL (ref 65–99)
HCT VFR BLD AUTO: 35.7 % (ref 37.5–51)
HGB BLD-MCNC: 11.5 G/DL (ref 13–17.7)
IMM GRANULOCYTES # BLD AUTO: 0.05 10*3/MM3 (ref 0–0.05)
IMM GRANULOCYTES NFR BLD AUTO: 0.6 % (ref 0–0.5)
LYMPHOCYTES # BLD AUTO: 1.86 10*3/MM3 (ref 0.7–3.1)
LYMPHOCYTES NFR BLD AUTO: 22.9 % (ref 19.6–45.3)
MCH RBC QN AUTO: 29.9 PG (ref 26.6–33)
MCHC RBC AUTO-ENTMCNC: 32.2 G/DL (ref 31.5–35.7)
MCV RBC AUTO: 93 FL (ref 79–97)
MONOCYTES # BLD AUTO: 0.63 10*3/MM3 (ref 0.1–0.9)
MONOCYTES NFR BLD AUTO: 7.8 % (ref 5–12)
NEUTROPHILS # BLD AUTO: 4.5 10*3/MM3 (ref 1.7–7)
NEUTROPHILS NFR BLD AUTO: 55.4 % (ref 42.7–76)
NRBC BLD AUTO-RTO: 0 /100 WBC (ref 0–0.2)
PLATELET # BLD AUTO: 464 10*3/MM3 (ref 140–450)
PMV BLD AUTO: 9.1 FL (ref 6–12)
POTASSIUM BLD-SCNC: 3.8 MMOL/L (ref 3.5–5.2)
PROT SERPL-MCNC: 7.6 G/DL (ref 6–8.5)
RBC # BLD AUTO: 3.84 10*6/MM3 (ref 4.14–5.8)
SODIUM BLD-SCNC: 139 MMOL/L (ref 136–145)
WBC NRBC COR # BLD: 8.12 10*3/MM3 (ref 3.4–10.8)

## 2019-07-30 PROCEDURE — 86140 C-REACTIVE PROTEIN: CPT | Performed by: INTERNAL MEDICINE

## 2019-07-30 PROCEDURE — 85025 COMPLETE CBC W/AUTO DIFF WBC: CPT | Performed by: INTERNAL MEDICINE

## 2019-07-30 PROCEDURE — 80053 COMPREHEN METABOLIC PANEL: CPT | Performed by: INTERNAL MEDICINE

## 2019-07-30 PROCEDURE — 85652 RBC SED RATE AUTOMATED: CPT | Performed by: INTERNAL MEDICINE

## 2019-07-30 PROCEDURE — 82550 ASSAY OF CK (CPK): CPT | Performed by: INTERNAL MEDICINE

## 2019-07-31 ENCOUNTER — TELEPHONE (OUTPATIENT)
Dept: ORTHOPEDIC SURGERY | Facility: CLINIC | Age: 64
End: 2019-07-31

## 2019-07-31 ENCOUNTER — OFFICE VISIT (OUTPATIENT)
Dept: ORTHOPEDIC SURGERY | Facility: CLINIC | Age: 64
End: 2019-07-31

## 2019-07-31 DIAGNOSIS — E11.65 UNCONTROLLED TYPE 2 DIABETES MELLITUS WITH HYPERGLYCEMIA (HCC): ICD-10-CM

## 2019-07-31 DIAGNOSIS — M86.372 CHRONIC MULTIFOCAL OSTEOMYELITIS OF LEFT FOOT (HCC): Primary | ICD-10-CM

## 2019-07-31 PROCEDURE — 99024 POSTOP FOLLOW-UP VISIT: CPT | Performed by: ORTHOPAEDIC SURGERY

## 2019-07-31 NOTE — TELEPHONE ENCOUNTER
Pt called, wanting to know how many times a week does he needs to get his dressing changed. Please give pt a call back [phone]

## 2019-07-31 NOTE — PROGRESS NOTES
Post-op (1 week s/p I&D (L) foot, excision (L) 4th metatarsal 07/23/2019)      Scott Benz is 1 week status post I&D left foot, excise 4th metatarsal, 7/23/19. He reports no fever, chills.  He reports pain is well controlled.  They have been taking lovenox for DVT prophylaxis.  They have been non weight bearing.  He is followed by Dr Ld Man of ID.  I looked at labs from yesterday, inflammatory markers still elevated.  He reports good glucose control- generally less than 120 when checked.  HGA1c on admit was 12.4    Past Surgical History:   Procedure Laterality Date   • AMPUTATION DIGIT Left 7/23/2019    Procedure: excision left 4th metatarsal;  Surgeon: Shannan Kraft MD;  Location:  [facility];  Service: Orthopedics   • APPENDECTOMY     • INCISION AND DRAINAGE LEG Left 7/23/2019    Procedure: I&D left foot wound;  Surgeon: Shannan Kraft MD;  Location:  [facility];  Service: Orthopedics       There were no vitals taken for this visit.       . Left foot with moderate swelling, still some erythema toes 2,3 but no necrosis, some bloody drainage no purulence, no fluctuance, incision is closed.  Some faint area of pressure on dorsal foot over cuneiforms from dressing    reviewed prior lab results    Assessment and Plan:   1. Chronic multifocal osteomyelitis of left foot (CMS/HCC)  Foot is stable at present, but still at risk.  There was extensive gangrenous necrosis that I had to debride as well as removing the bone.  I want them to use a loose ace wrap for the dressing, not coban.  His foot is at high risk for any pressure on the skin.  He must be absolutely non-wt bearing, continue antibiotics.  I will see him in a week    2. Uncontrolled type 2 diabetes mellitus with hyperglycemia (CMS/HCC)  Reports improving glucose control

## 2019-07-31 NOTE — TELEPHONE ENCOUNTER
Called Mr Demar back- let him know that Dr Kraft said that every other day would be fine for dressing changes. He will call back with any further problems or questions.       Angelique

## 2019-08-04 ENCOUNTER — READMISSION MANAGEMENT (OUTPATIENT)
Dept: CALL CENTER | Facility: HOSPITAL | Age: 64
End: 2019-08-04

## 2019-08-04 NOTE — OUTREACH NOTE
General Surgery Week 2 Survey      Responses   Facility patient discharged from?  [place]   Does the patient have one of the following disease processes/diagnoses(primary or secondary)?  General Surgery   Week 2 attempt successful?  Yes   Call start time  1220   Call end time  1226   Discharge diagnosis  S/P Irrigation debridement necrotic tissue left fourth webspace and fifth webspace and excision fourth metatarsal, osteomyelitis acte, Uncontrolled DM II with hyperglycemia, Diabetic polyneuropathy, osteomyelitis left foot, decreased pulses in feet, iimpaired functional mobility, balance,gait and endurance,    Meds reviewed with patient/caregiver?  Yes   Is the patient taking all medications as directed (includes completed medication regime)?  Yes   Has the patient kept scheduled appointments due by today?  Yes   What is the patient's perception of their health status since discharge?  Improving   Week 2 call completed?  Yes   Wrap up additional comments  Pt wishes the healing process was faster but is doing well.           Tatiana Miller RN

## 2019-08-05 ENCOUNTER — LAB REQUISITION (OUTPATIENT)
Dept: LAB | Facility: HOSPITAL | Age: 64
End: 2019-08-05

## 2019-08-05 DIAGNOSIS — Z00.00 ROUTINE GENERAL MEDICAL EXAMINATION AT A HEALTH CARE FACILITY: ICD-10-CM

## 2019-08-05 LAB
ALBUMIN SERPL-MCNC: 3.8 G/DL (ref 3.5–5.2)
ALBUMIN/GLOB SERPL: 1 G/DL
ALP SERPL-CCNC: 66 U/L (ref 39–117)
ALT SERPL W P-5'-P-CCNC: 6 U/L (ref 1–41)
ANION GAP SERPL CALCULATED.3IONS-SCNC: 14 MMOL/L (ref 5–15)
AST SERPL-CCNC: 15 U/L (ref 1–40)
BASOPHILS # BLD AUTO: 0.13 10*3/MM3 (ref 0–0.2)
BASOPHILS NFR BLD AUTO: 1.6 % (ref 0–1.5)
BILIRUB SERPL-MCNC: 0.3 MG/DL (ref 0.2–1.2)
BUN BLD-MCNC: 23 MG/DL (ref 8–23)
BUN/CREAT SERPL: 20 (ref 7–25)
CALCIUM SPEC-SCNC: 9 MG/DL (ref 8.6–10.5)
CHLORIDE SERPL-SCNC: 102 MMOL/L (ref 98–107)
CK SERPL-CCNC: 31 U/L (ref 20–200)
CO2 SERPL-SCNC: 24 MMOL/L (ref 22–29)
CREAT BLD-MCNC: 1.15 MG/DL (ref 0.76–1.27)
CRP SERPL-MCNC: 0.46 MG/DL (ref 0–0.5)
DEPRECATED RDW RBC AUTO: 47 FL (ref 37–54)
EOSINOPHIL # BLD AUTO: 0.5 10*3/MM3 (ref 0–0.4)
EOSINOPHIL NFR BLD AUTO: 6 % (ref 0.3–6.2)
ERYTHROCYTE [DISTWIDTH] IN BLOOD BY AUTOMATED COUNT: 13.9 % (ref 12.3–15.4)
ERYTHROCYTE [SEDIMENTATION RATE] IN BLOOD: 68 MM/HR (ref 0–20)
GFR SERPL CREATININE-BSD FRML MDRD: 64 ML/MIN/1.73
GLOBULIN UR ELPH-MCNC: 3.8 GM/DL
GLUCOSE BLD-MCNC: 206 MG/DL (ref 65–99)
HCT VFR BLD AUTO: 35.7 % (ref 37.5–51)
HGB BLD-MCNC: 11.5 G/DL (ref 13–17.7)
IMM GRANULOCYTES # BLD AUTO: 0.07 10*3/MM3 (ref 0–0.05)
IMM GRANULOCYTES NFR BLD AUTO: 0.8 % (ref 0–0.5)
LYMPHOCYTES # BLD AUTO: 2.05 10*3/MM3 (ref 0.7–3.1)
LYMPHOCYTES NFR BLD AUTO: 24.6 % (ref 19.6–45.3)
MCH RBC QN AUTO: 29.9 PG (ref 26.6–33)
MCHC RBC AUTO-ENTMCNC: 32.2 G/DL (ref 31.5–35.7)
MCV RBC AUTO: 93 FL (ref 79–97)
MONOCYTES # BLD AUTO: 0.59 10*3/MM3 (ref 0.1–0.9)
MONOCYTES NFR BLD AUTO: 7.1 % (ref 5–12)
NEUTROPHILS # BLD AUTO: 5.01 10*3/MM3 (ref 1.7–7)
NEUTROPHILS NFR BLD AUTO: 59.9 % (ref 42.7–76)
NRBC BLD AUTO-RTO: 0 /100 WBC (ref 0–0.2)
PLATELET # BLD AUTO: 405 10*3/MM3 (ref 140–450)
PMV BLD AUTO: 9.1 FL (ref 6–12)
POTASSIUM BLD-SCNC: 4.3 MMOL/L (ref 3.5–5.2)
PROT SERPL-MCNC: 7.6 G/DL (ref 6–8.5)
RBC # BLD AUTO: 3.84 10*6/MM3 (ref 4.14–5.8)
SODIUM BLD-SCNC: 140 MMOL/L (ref 136–145)
WBC NRBC COR # BLD: 8.35 10*3/MM3 (ref 3.4–10.8)

## 2019-08-05 PROCEDURE — 86140 C-REACTIVE PROTEIN: CPT | Performed by: INTERNAL MEDICINE

## 2019-08-05 PROCEDURE — 80053 COMPREHEN METABOLIC PANEL: CPT | Performed by: INTERNAL MEDICINE

## 2019-08-05 PROCEDURE — 85025 COMPLETE CBC W/AUTO DIFF WBC: CPT | Performed by: INTERNAL MEDICINE

## 2019-08-05 PROCEDURE — 85652 RBC SED RATE AUTOMATED: CPT | Performed by: INTERNAL MEDICINE

## 2019-08-05 PROCEDURE — 82550 ASSAY OF CK (CPK): CPT | Performed by: INTERNAL MEDICINE

## 2019-08-05 PROCEDURE — 83036 HEMOGLOBIN GLYCOSYLATED A1C: CPT | Performed by: INTERNAL MEDICINE

## 2019-08-06 LAB — HBA1C MFR BLD: 9.8 % (ref 4.8–5.6)

## 2019-08-07 ENCOUNTER — OFFICE VISIT (OUTPATIENT)
Dept: ORTHOPEDIC SURGERY | Facility: CLINIC | Age: 64
End: 2019-08-07

## 2019-08-07 DIAGNOSIS — M86.372 CHRONIC MULTIFOCAL OSTEOMYELITIS OF LEFT FOOT (HCC): Primary | ICD-10-CM

## 2019-08-07 DIAGNOSIS — E11.65 UNCONTROLLED TYPE 2 DIABETES MELLITUS WITH HYPERGLYCEMIA (HCC): ICD-10-CM

## 2019-08-07 PROCEDURE — 99024 POSTOP FOLLOW-UP VISIT: CPT | Performed by: ORTHOPAEDIC SURGERY

## 2019-08-07 NOTE — PROGRESS NOTES
Post-op Follow-up (1 week follow up - 2 weeks week s/p I&D (L) foot, excision (L) 4th metatarsal 07/23/2019)      Scott Demar is 2 weeks status post I&D left foot, excision fourth metatarsal, 7/23/2019. He reports no fever, chills.  He reports pain is well controlled.  They have been taking lovenox for DVT prophylaxis.  They have been non weight bearing.      Past Surgical History:   Procedure Laterality Date   • AMPUTATION DIGIT Left 7/23/2019    Procedure: excision left 4th metatarsal;  Surgeon: Shannan Kraft MD;  Location:  [facility];  Service: Orthopedics   • APPENDECTOMY     • INCISION AND DRAINAGE LEG Left 7/23/2019    Procedure: I&D left foot wound;  Surgeon: Shannan Kraft MD;  Location:  [facility];  Service: Orthopedics       There were no vitals taken for this visit.        left foot incision still has some dorsal erythema, some bloody drainage, no fluctuance, no proximal erythema.  I removed some of the sutures.    none    Assessment and Plan:   1. Chronic multifocal osteomyelitis of left foot (CMS/HCC)  Stable, but as I explained to him he still has a long way to go to be healed.  I removed some of the sutures.  Continue to be absolutely nonweightbearing, continue dressing change.  He remains at high risk for below or above-knee amputation.  I will see him in 2 weeks nonweightbearing x-ray of the foot at that time    2. Uncontrolled type 2 diabetes mellitus with hyperglycemia (CMS/HCC)  He reports much better glucose control.

## 2019-08-11 ENCOUNTER — READMISSION MANAGEMENT (OUTPATIENT)
Dept: CALL CENTER | Facility: HOSPITAL | Age: 64
End: 2019-08-11

## 2019-08-11 NOTE — OUTREACH NOTE
General Surgery Week 3 Survey      Responses   Facility patient discharged from?  [place]   Does the patient have one of the following disease processes/diagnoses(primary or secondary)?  General Surgery   Week 3 attempt successful?  No   Unsuccessful attempts  Attempt 1          Tina Chow RN

## 2019-08-12 ENCOUNTER — LAB REQUISITION (OUTPATIENT)
Dept: LAB | Facility: HOSPITAL | Age: 64
End: 2019-08-12

## 2019-08-12 ENCOUNTER — READMISSION MANAGEMENT (OUTPATIENT)
Dept: CALL CENTER | Facility: HOSPITAL | Age: 64
End: 2019-08-12

## 2019-08-12 DIAGNOSIS — Z00.00 ROUTINE GENERAL MEDICAL EXAMINATION AT A HEALTH CARE FACILITY: ICD-10-CM

## 2019-08-12 LAB
ALBUMIN SERPL-MCNC: 3.8 G/DL (ref 3.5–5.2)
ALBUMIN/GLOB SERPL: 1 G/DL
ALP SERPL-CCNC: 59 U/L (ref 39–117)
ALT SERPL W P-5'-P-CCNC: 8 U/L (ref 1–41)
ANION GAP SERPL CALCULATED.3IONS-SCNC: 14 MMOL/L (ref 5–15)
AST SERPL-CCNC: 16 U/L (ref 1–40)
BASOPHILS # BLD AUTO: 0.09 10*3/MM3 (ref 0–0.2)
BASOPHILS NFR BLD AUTO: 1.3 % (ref 0–1.5)
BILIRUB SERPL-MCNC: 0.3 MG/DL (ref 0.2–1.2)
BUN BLD-MCNC: 15 MG/DL (ref 8–23)
BUN/CREAT SERPL: 13.8 (ref 7–25)
CALCIUM SPEC-SCNC: 9 MG/DL (ref 8.6–10.5)
CHLORIDE SERPL-SCNC: 102 MMOL/L (ref 98–107)
CK SERPL-CCNC: 29 U/L (ref 20–200)
CO2 SERPL-SCNC: 26 MMOL/L (ref 22–29)
CREAT BLD-MCNC: 1.09 MG/DL (ref 0.76–1.27)
CRP SERPL-MCNC: 0.14 MG/DL (ref 0–0.5)
DEPRECATED RDW RBC AUTO: 48.9 FL (ref 37–54)
EOSINOPHIL # BLD AUTO: 0.25 10*3/MM3 (ref 0–0.4)
EOSINOPHIL NFR BLD AUTO: 3.6 % (ref 0.3–6.2)
ERYTHROCYTE [DISTWIDTH] IN BLOOD BY AUTOMATED COUNT: 14.5 % (ref 12.3–15.4)
ERYTHROCYTE [SEDIMENTATION RATE] IN BLOOD: 62 MM/HR (ref 0–20)
GFR SERPL CREATININE-BSD FRML MDRD: 68 ML/MIN/1.73
GLOBULIN UR ELPH-MCNC: 3.8 GM/DL
GLUCOSE BLD-MCNC: 173 MG/DL (ref 65–99)
HCT VFR BLD AUTO: 36.8 % (ref 37.5–51)
HGB BLD-MCNC: 11.6 G/DL (ref 13–17.7)
IMM GRANULOCYTES # BLD AUTO: 0.04 10*3/MM3 (ref 0–0.05)
IMM GRANULOCYTES NFR BLD AUTO: 0.6 % (ref 0–0.5)
LYMPHOCYTES # BLD AUTO: 2.16 10*3/MM3 (ref 0.7–3.1)
LYMPHOCYTES NFR BLD AUTO: 31.2 % (ref 19.6–45.3)
MCH RBC QN AUTO: 29.3 PG (ref 26.6–33)
MCHC RBC AUTO-ENTMCNC: 31.5 G/DL (ref 31.5–35.7)
MCV RBC AUTO: 92.9 FL (ref 79–97)
MONOCYTES # BLD AUTO: 0.48 10*3/MM3 (ref 0.1–0.9)
MONOCYTES NFR BLD AUTO: 6.9 % (ref 5–12)
NEUTROPHILS # BLD AUTO: 3.9 10*3/MM3 (ref 1.7–7)
NEUTROPHILS NFR BLD AUTO: 56.4 % (ref 42.7–76)
NRBC BLD AUTO-RTO: 0 /100 WBC (ref 0–0.2)
PLATELET # BLD AUTO: 392 10*3/MM3 (ref 140–450)
PMV BLD AUTO: 9.6 FL (ref 6–12)
POTASSIUM BLD-SCNC: 4.1 MMOL/L (ref 3.5–5.2)
PROT SERPL-MCNC: 7.6 G/DL (ref 6–8.5)
RBC # BLD AUTO: 3.96 10*6/MM3 (ref 4.14–5.8)
SODIUM BLD-SCNC: 142 MMOL/L (ref 136–145)
WBC NRBC COR # BLD: 6.92 10*3/MM3 (ref 3.4–10.8)

## 2019-08-12 PROCEDURE — 85025 COMPLETE CBC W/AUTO DIFF WBC: CPT | Performed by: INTERNAL MEDICINE

## 2019-08-12 PROCEDURE — 82550 ASSAY OF CK (CPK): CPT | Performed by: INTERNAL MEDICINE

## 2019-08-12 PROCEDURE — 80053 COMPREHEN METABOLIC PANEL: CPT | Performed by: INTERNAL MEDICINE

## 2019-08-12 PROCEDURE — 86140 C-REACTIVE PROTEIN: CPT | Performed by: INTERNAL MEDICINE

## 2019-08-12 PROCEDURE — 85652 RBC SED RATE AUTOMATED: CPT | Performed by: INTERNAL MEDICINE

## 2019-08-12 PROCEDURE — 36415 COLL VENOUS BLD VENIPUNCTURE: CPT | Performed by: INTERNAL MEDICINE

## 2019-08-12 NOTE — OUTREACH NOTE
General Surgery Week 3 Survey      Responses   Facility patient discharged from?  [facility]   Does the patient have one of the following disease processes/diagnoses(primary or secondary)?  General Surgery   Week 3 attempt successful?  No   Unsuccessful attempts  Attempt 2          Michaelle Tran RN

## 2019-08-19 ENCOUNTER — LAB REQUISITION (OUTPATIENT)
Dept: LAB | Facility: HOSPITAL | Age: 64
End: 2019-08-19

## 2019-08-19 DIAGNOSIS — M86.172 OTHER ACUTE OSTEOMYELITIS, LEFT ANKLE AND FOOT (HCC): ICD-10-CM

## 2019-08-19 DIAGNOSIS — M86.672 OTHER CHRONIC OSTEOMYELITIS, LEFT ANKLE AND FOOT (HCC): ICD-10-CM

## 2019-08-19 LAB
ALBUMIN SERPL-MCNC: 3.9 G/DL (ref 3.5–5.2)
ALBUMIN/GLOB SERPL: 1.1 G/DL
ALP SERPL-CCNC: 56 U/L (ref 39–117)
ALT SERPL W P-5'-P-CCNC: 6 U/L (ref 1–41)
ANION GAP SERPL CALCULATED.3IONS-SCNC: 15 MMOL/L (ref 5–15)
AST SERPL-CCNC: 12 U/L (ref 1–40)
BASOPHILS # BLD AUTO: 0.07 10*3/MM3 (ref 0–0.2)
BASOPHILS NFR BLD AUTO: 1.2 % (ref 0–1.5)
BILIRUB SERPL-MCNC: 0.3 MG/DL (ref 0.2–1.2)
BUN BLD-MCNC: 19 MG/DL (ref 8–23)
BUN/CREAT SERPL: 17.1 (ref 7–25)
CALCIUM SPEC-SCNC: 9.1 MG/DL (ref 8.6–10.5)
CHLORIDE SERPL-SCNC: 101 MMOL/L (ref 98–107)
CK SERPL-CCNC: 35 U/L (ref 20–200)
CO2 SERPL-SCNC: 25 MMOL/L (ref 22–29)
CREAT BLD-MCNC: 1.11 MG/DL (ref 0.76–1.27)
CRP SERPL-MCNC: 0.17 MG/DL (ref 0–0.5)
DEPRECATED RDW RBC AUTO: 48.2 FL (ref 37–54)
EOSINOPHIL # BLD AUTO: 0.22 10*3/MM3 (ref 0–0.4)
EOSINOPHIL NFR BLD AUTO: 3.8 % (ref 0.3–6.2)
ERYTHROCYTE [DISTWIDTH] IN BLOOD BY AUTOMATED COUNT: 14 % (ref 12.3–15.4)
ERYTHROCYTE [SEDIMENTATION RATE] IN BLOOD: 64 MM/HR (ref 0–20)
GFR SERPL CREATININE-BSD FRML MDRD: 67 ML/MIN/1.73
GLOBULIN UR ELPH-MCNC: 3.4 GM/DL
GLUCOSE BLD-MCNC: 186 MG/DL (ref 65–99)
HCT VFR BLD AUTO: 37 % (ref 37.5–51)
HGB BLD-MCNC: 11.8 G/DL (ref 13–17.7)
IMM GRANULOCYTES # BLD AUTO: 0.03 10*3/MM3 (ref 0–0.05)
IMM GRANULOCYTES NFR BLD AUTO: 0.5 % (ref 0–0.5)
LYMPHOCYTES # BLD AUTO: 1.98 10*3/MM3 (ref 0.7–3.1)
LYMPHOCYTES NFR BLD AUTO: 34.4 % (ref 19.6–45.3)
MCH RBC QN AUTO: 29.7 PG (ref 26.6–33)
MCHC RBC AUTO-ENTMCNC: 31.9 G/DL (ref 31.5–35.7)
MCV RBC AUTO: 93.2 FL (ref 79–97)
MONOCYTES # BLD AUTO: 0.47 10*3/MM3 (ref 0.1–0.9)
MONOCYTES NFR BLD AUTO: 8.2 % (ref 5–12)
NEUTROPHILS # BLD AUTO: 2.99 10*3/MM3 (ref 1.7–7)
NEUTROPHILS NFR BLD AUTO: 51.9 % (ref 42.7–76)
NRBC BLD AUTO-RTO: 0 /100 WBC (ref 0–0.2)
PLATELET # BLD AUTO: 331 10*3/MM3 (ref 140–450)
PMV BLD AUTO: 10.2 FL (ref 6–12)
POTASSIUM BLD-SCNC: 4.1 MMOL/L (ref 3.5–5.2)
PROT SERPL-MCNC: 7.3 G/DL (ref 6–8.5)
RBC # BLD AUTO: 3.97 10*6/MM3 (ref 4.14–5.8)
SODIUM BLD-SCNC: 141 MMOL/L (ref 136–145)
WBC NRBC COR # BLD: 5.76 10*3/MM3 (ref 3.4–10.8)

## 2019-08-19 PROCEDURE — 85025 COMPLETE CBC W/AUTO DIFF WBC: CPT | Performed by: INTERNAL MEDICINE

## 2019-08-19 PROCEDURE — 85652 RBC SED RATE AUTOMATED: CPT | Performed by: INTERNAL MEDICINE

## 2019-08-19 PROCEDURE — 36415 COLL VENOUS BLD VENIPUNCTURE: CPT | Performed by: INTERNAL MEDICINE

## 2019-08-19 PROCEDURE — 82550 ASSAY OF CK (CPK): CPT | Performed by: INTERNAL MEDICINE

## 2019-08-19 PROCEDURE — 86140 C-REACTIVE PROTEIN: CPT | Performed by: INTERNAL MEDICINE

## 2019-08-19 PROCEDURE — 80053 COMPREHEN METABOLIC PANEL: CPT | Performed by: INTERNAL MEDICINE

## 2019-08-21 ENCOUNTER — OFFICE VISIT (OUTPATIENT)
Dept: ORTHOPEDIC SURGERY | Facility: CLINIC | Age: 64
End: 2019-08-21

## 2019-08-21 DIAGNOSIS — Z09 SURGERY FOLLOW-UP: Primary | ICD-10-CM

## 2019-08-21 PROCEDURE — 99024 POSTOP FOLLOW-UP VISIT: CPT | Performed by: ORTHOPAEDIC SURGERY

## 2019-08-21 NOTE — PROGRESS NOTES
Post-op Follow-up ( 1 month s/p I&D (L) foot, excision (L) 4th metatarsal 07/23/2019))      Scott [place] is 4 weeks status post I&D left foot, excision left fourth metatarsal, 7/23/2019. He reports no fever, chills.  He had more pain over the past week because 1 of the home health nurses used a ton of saline and did a lot of irritation to his foot.  I discussed this with Dr. Man.  I want him to keep dry gauze between the toes over the incision.  He reports much better glucose control.    Past Surgical History:   Procedure Laterality Date   • AMPUTATION DIGIT Left 7/23/2019    Procedure: excision left 4th metatarsal;  Surgeon: Shannan Kraft MD;  Location: [facility] OR;  Service: Orthopedics   • APPENDECTOMY     • INCISION AND DRAINAGE LEG Left 7/23/2019    Procedure: I&D left foot wound;  Surgeon: Shannan Kraft MD;  Location: [facility] OR;  Service: Orthopedics       There were no vitals taken for this visit.     Left foot excision is healing slowly but adequately.  There is a little bit of macerated tissue between the fifth and third toes, but no signs of infection.  I removed some of the remaining sutures.  I removed some of the eschar.  phone conversation with physician    Assessment and Plan:   1. Surgery follow-up    Continue nonweightbearing, keep dry gauze between the toes, I will see him in 2 weeks to possibly remove the remaining sutures    - XR Foot 2 View Left

## 2019-08-26 ENCOUNTER — LAB REQUISITION (OUTPATIENT)
Dept: LAB | Facility: HOSPITAL | Age: 64
End: 2019-08-26

## 2019-08-26 DIAGNOSIS — Z00.00 ROUTINE GENERAL MEDICAL EXAMINATION AT A HEALTH CARE FACILITY: ICD-10-CM

## 2019-08-26 LAB
ALBUMIN SERPL-MCNC: 4.2 G/DL (ref 3.5–5.2)
ALBUMIN/GLOB SERPL: 1.4 G/DL
ALP SERPL-CCNC: 57 U/L (ref 39–117)
ALT SERPL W P-5'-P-CCNC: 7 U/L (ref 1–41)
ANION GAP SERPL CALCULATED.3IONS-SCNC: 12 MMOL/L (ref 5–15)
AST SERPL-CCNC: 14 U/L (ref 1–40)
BASOPHILS # BLD AUTO: 0.05 10*3/MM3 (ref 0–0.2)
BASOPHILS NFR BLD AUTO: 1.1 % (ref 0–1.5)
BILIRUB SERPL-MCNC: 0.3 MG/DL (ref 0.2–1.2)
BUN BLD-MCNC: 14 MG/DL (ref 8–23)
BUN/CREAT SERPL: 15.2 (ref 7–25)
CALCIUM SPEC-SCNC: 9.1 MG/DL (ref 8.6–10.5)
CHLORIDE SERPL-SCNC: 101 MMOL/L (ref 98–107)
CK SERPL-CCNC: 34 U/L (ref 20–200)
CO2 SERPL-SCNC: 26 MMOL/L (ref 22–29)
CREAT BLD-MCNC: 0.92 MG/DL (ref 0.76–1.27)
CRP SERPL-MCNC: 0.07 MG/DL (ref 0–0.5)
DEPRECATED RDW RBC AUTO: 45.6 FL (ref 37–54)
EOSINOPHIL # BLD AUTO: 0.2 10*3/MM3 (ref 0–0.4)
EOSINOPHIL NFR BLD AUTO: 4.3 % (ref 0.3–6.2)
ERYTHROCYTE [DISTWIDTH] IN BLOOD BY AUTOMATED COUNT: 13.5 % (ref 12.3–15.4)
ERYTHROCYTE [SEDIMENTATION RATE] IN BLOOD: 48 MM/HR (ref 0–20)
GFR SERPL CREATININE-BSD FRML MDRD: 83 ML/MIN/1.73
GLOBULIN UR ELPH-MCNC: 3 GM/DL
GLUCOSE BLD-MCNC: 193 MG/DL (ref 65–99)
HCT VFR BLD AUTO: 37.9 % (ref 37.5–51)
HGB BLD-MCNC: 12.3 G/DL (ref 13–17.7)
IMM GRANULOCYTES # BLD AUTO: 0.02 10*3/MM3 (ref 0–0.05)
IMM GRANULOCYTES NFR BLD AUTO: 0.4 % (ref 0–0.5)
LYMPHOCYTES # BLD AUTO: 1.8 10*3/MM3 (ref 0.7–3.1)
LYMPHOCYTES NFR BLD AUTO: 39 % (ref 19.6–45.3)
MCH RBC QN AUTO: 29.6 PG (ref 26.6–33)
MCHC RBC AUTO-ENTMCNC: 32.5 G/DL (ref 31.5–35.7)
MCV RBC AUTO: 91.3 FL (ref 79–97)
MONOCYTES # BLD AUTO: 0.48 10*3/MM3 (ref 0.1–0.9)
MONOCYTES NFR BLD AUTO: 10.4 % (ref 5–12)
NEUTROPHILS # BLD AUTO: 2.07 10*3/MM3 (ref 1.7–7)
NEUTROPHILS NFR BLD AUTO: 44.8 % (ref 42.7–76)
NRBC BLD AUTO-RTO: 0 /100 WBC (ref 0–0.2)
PLATELET # BLD AUTO: 297 10*3/MM3 (ref 140–450)
PMV BLD AUTO: 9.8 FL (ref 6–12)
POTASSIUM BLD-SCNC: 3.9 MMOL/L (ref 3.5–5.2)
PROT SERPL-MCNC: 7.2 G/DL (ref 6–8.5)
RBC # BLD AUTO: 4.15 10*6/MM3 (ref 4.14–5.8)
SODIUM BLD-SCNC: 139 MMOL/L (ref 136–145)
WBC NRBC COR # BLD: 4.62 10*3/MM3 (ref 3.4–10.8)

## 2019-08-26 PROCEDURE — 82550 ASSAY OF CK (CPK): CPT

## 2019-08-26 PROCEDURE — 80053 COMPREHEN METABOLIC PANEL: CPT

## 2019-08-26 PROCEDURE — 36415 COLL VENOUS BLD VENIPUNCTURE: CPT

## 2019-08-26 PROCEDURE — 86140 C-REACTIVE PROTEIN: CPT

## 2019-08-26 PROCEDURE — 85025 COMPLETE CBC W/AUTO DIFF WBC: CPT

## 2019-08-26 PROCEDURE — 85652 RBC SED RATE AUTOMATED: CPT

## 2019-09-03 LAB
FUNGUS WND CULT: NORMAL
MYCOBACTERIUM SPEC CULT: NORMAL
NIGHT BLUE STAIN TISS: NORMAL

## 2019-09-04 ENCOUNTER — LAB REQUISITION (OUTPATIENT)
Dept: LAB | Facility: HOSPITAL | Age: 64
End: 2019-09-04

## 2019-09-04 ENCOUNTER — TRANSCRIBE ORDERS (OUTPATIENT)
Dept: LAB | Facility: HOSPITAL | Age: 64
End: 2019-09-04

## 2019-09-04 ENCOUNTER — OFFICE VISIT (OUTPATIENT)
Dept: ORTHOPEDIC SURGERY | Facility: CLINIC | Age: 64
End: 2019-09-04

## 2019-09-04 DIAGNOSIS — E11.65 UNCONTROLLED TYPE 2 DIABETES MELLITUS WITH HYPERGLYCEMIA (HCC): ICD-10-CM

## 2019-09-04 DIAGNOSIS — E11.42 DIABETIC POLYNEUROPATHY ASSOCIATED WITH TYPE 2 DIABETES MELLITUS (HCC): ICD-10-CM

## 2019-09-04 DIAGNOSIS — Z00.00 ROUTINE GENERAL MEDICAL EXAMINATION AT A HEALTH CARE FACILITY: ICD-10-CM

## 2019-09-04 DIAGNOSIS — M86.372 CHRONIC MULTIFOCAL OSTEOMYELITIS OF LEFT FOOT (HCC): Primary | ICD-10-CM

## 2019-09-04 LAB
ALBUMIN SERPL-MCNC: 4.6 G/DL (ref 3.5–5.2)
ALBUMIN/GLOB SERPL: 1.4 G/DL
ALP SERPL-CCNC: 60 U/L (ref 39–117)
ALT SERPL W P-5'-P-CCNC: 7 U/L (ref 1–41)
ANION GAP SERPL CALCULATED.3IONS-SCNC: 15 MMOL/L (ref 5–15)
AST SERPL-CCNC: 16 U/L (ref 1–40)
BASOPHILS # BLD AUTO: 0.04 10*3/MM3 (ref 0–0.2)
BASOPHILS NFR BLD AUTO: 0.8 % (ref 0–1.5)
BILIRUB SERPL-MCNC: 0.3 MG/DL (ref 0.2–1.2)
BUN BLD-MCNC: 19 MG/DL (ref 8–23)
BUN/CREAT SERPL: 16.4 (ref 7–25)
CALCIUM SPEC-SCNC: 9.6 MG/DL (ref 8.6–10.5)
CHLORIDE SERPL-SCNC: 100 MMOL/L (ref 98–107)
CK SERPL-CCNC: 40 U/L (ref 20–200)
CO2 SERPL-SCNC: 27 MMOL/L (ref 22–29)
CREAT BLD-MCNC: 1.16 MG/DL (ref 0.76–1.27)
CRP SERPL-MCNC: 0.08 MG/DL (ref 0–0.5)
DEPRECATED RDW RBC AUTO: 46.2 FL (ref 37–54)
EOSINOPHIL # BLD AUTO: 0.17 10*3/MM3 (ref 0–0.4)
EOSINOPHIL NFR BLD AUTO: 3.4 % (ref 0.3–6.2)
ERYTHROCYTE [DISTWIDTH] IN BLOOD BY AUTOMATED COUNT: 13.7 % (ref 12.3–15.4)
ERYTHROCYTE [SEDIMENTATION RATE] IN BLOOD: 41 MM/HR (ref 0–20)
GFR SERPL CREATININE-BSD FRML MDRD: 63 ML/MIN/1.73
GLOBULIN UR ELPH-MCNC: 3.4 GM/DL
GLUCOSE BLD-MCNC: 166 MG/DL (ref 65–99)
HCT VFR BLD AUTO: 40.8 % (ref 37.5–51)
HGB BLD-MCNC: 12.9 G/DL (ref 13–17.7)
IMM GRANULOCYTES # BLD AUTO: 0.01 10*3/MM3 (ref 0–0.05)
IMM GRANULOCYTES NFR BLD AUTO: 0.2 % (ref 0–0.5)
LYMPHOCYTES # BLD AUTO: 2.17 10*3/MM3 (ref 0.7–3.1)
LYMPHOCYTES NFR BLD AUTO: 43.9 % (ref 19.6–45.3)
MCH RBC QN AUTO: 29.1 PG (ref 26.6–33)
MCHC RBC AUTO-ENTMCNC: 31.6 G/DL (ref 31.5–35.7)
MCV RBC AUTO: 92.1 FL (ref 79–97)
MONOCYTES # BLD AUTO: 0.51 10*3/MM3 (ref 0.1–0.9)
MONOCYTES NFR BLD AUTO: 10.3 % (ref 5–12)
NEUTROPHILS # BLD AUTO: 2.04 10*3/MM3 (ref 1.7–7)
NEUTROPHILS NFR BLD AUTO: 41.4 % (ref 42.7–76)
NRBC BLD AUTO-RTO: 0 /100 WBC (ref 0–0.2)
PLATELET # BLD AUTO: 288 10*3/MM3 (ref 140–450)
PMV BLD AUTO: 9.7 FL (ref 6–12)
POTASSIUM BLD-SCNC: 4.1 MMOL/L (ref 3.5–5.2)
PROT SERPL-MCNC: 8 G/DL (ref 6–8.5)
RBC # BLD AUTO: 4.43 10*6/MM3 (ref 4.14–5.8)
SODIUM BLD-SCNC: 142 MMOL/L (ref 136–145)
WBC NRBC COR # BLD: 4.94 10*3/MM3 (ref 3.4–10.8)

## 2019-09-04 PROCEDURE — 85652 RBC SED RATE AUTOMATED: CPT | Performed by: INTERNAL MEDICINE

## 2019-09-04 PROCEDURE — 99024 POSTOP FOLLOW-UP VISIT: CPT | Performed by: ORTHOPAEDIC SURGERY

## 2019-09-04 PROCEDURE — 80053 COMPREHEN METABOLIC PANEL: CPT | Performed by: INTERNAL MEDICINE

## 2019-09-04 PROCEDURE — 85025 COMPLETE CBC W/AUTO DIFF WBC: CPT | Performed by: INTERNAL MEDICINE

## 2019-09-04 PROCEDURE — 86140 C-REACTIVE PROTEIN: CPT | Performed by: INTERNAL MEDICINE

## 2019-09-04 PROCEDURE — 82550 ASSAY OF CK (CPK): CPT | Performed by: INTERNAL MEDICINE

## 2019-09-04 NOTE — PROGRESS NOTES
Post-op (2 wek f/u; 6 weeks status post Left Foot I&D Wound and Excision Left 4th Metatarsal 07/23/2019)      Scott [place] is 6 weeks status post I&D left foot for osteomyelitis, excision fourth metatarsal, 7/23/2019. He reports no fever, chills.  He has been nonweightbearing.  He reports better glucose control.  His glucose this morning was 103.    Past Surgical History:   Procedure Laterality Date   • AMPUTATION DIGIT Left 7/23/2019    Procedure: excision left 4th metatarsal;  Surgeon: Shannan Kraft MD;  Location: [facility] OR;  Service: Orthopedics   • APPENDECTOMY     • INCISION AND DRAINAGE LEG Left 7/23/2019    Procedure: I&D left foot wound;  Surgeon: Shannan Kraft MD;  Location: [facility] OR;  Service: Orthopedics       There were no vitals taken for this visit.        no erythema, no drainage, no sign of infection, normal post op swelling left foot, the distalmost part of the incision is now sealed, there is an eschar in place, we remove the remaining sutures    phone conversation with physician I discussed this with Dr. Man    Assessment and Plan:   1. Chronic multifocal osteomyelitis of left foot (CMS/HCC)  Improving slowly, continue nonweightbearing.  We remove the remaining sutures.  I want him to keep a dry gauze between the toes.  He may gently wash the foot with a damp washcloth and antibacterial soap, want him to moisturize the skin.  He must stay absolutely nonweightbearing.  He reports he is running low on Norco, I would not expect to refill this given the foot that is completely insensate.  He understands.  I will see him in 3 weeks nonweightbearing x-ray of the foot 3 views    2. Uncontrolled type 2 diabetes mellitus with hyperglycemia (CMS/HCC)  As above trying to improve glucose control    3. Diabetic polyneuropathy associated with type 2 diabetes mellitus (CMS/HCC)  Very dense neuropathy

## 2019-09-23 ENCOUNTER — OFFICE VISIT (OUTPATIENT)
Dept: ORTHOPEDIC SURGERY | Facility: CLINIC | Age: 64
End: 2019-09-23

## 2019-09-23 DIAGNOSIS — M86.372 CHRONIC MULTIFOCAL OSTEOMYELITIS OF LEFT FOOT (HCC): Primary | ICD-10-CM

## 2019-09-23 DIAGNOSIS — E11.65 UNCONTROLLED TYPE 2 DIABETES MELLITUS WITH HYPERGLYCEMIA (HCC): ICD-10-CM

## 2019-09-23 PROCEDURE — 99024 POSTOP FOLLOW-UP VISIT: CPT | Performed by: ORTHOPAEDIC SURGERY

## 2019-09-23 RX ORDER — DOXYCYCLINE 100 MG/1
1 TABLET ORAL 2 TIMES DAILY
COMMUNITY
Start: 2019-09-04

## 2019-09-23 NOTE — PATIENT INSTRUCTIONS
"Education    Diabetic Foot Care / Neuropathic Foot Care    SKIN  · Remove shoes and socks and look at the feet every morning and night.         · Blisters - clean it with peroxide or alcohol.  DO NOT put a shoe back on until it is healed.  DO NOT WALK ON THE FOOT.  If it is red or looks infected, call your doctor.    · Callus - sand calluses daily, it works better on dry skin.  A PED EGG is the best tool, or file or a pumice stone (available [facility]) using a back and forth motion over the callus.    · Ingrown nail - soak it in soapy water and call your doctor.    · Ulcers or sores on the foot - DO NOT PUT A SHOE ON, DO NOT WALK ON THE FOOT, go to the doctor who looks at your feet.    · Moisturize the feet every night.  An easy method: The foot with a thin layer of Vaseline or Bag [place] (you can find this at [employer], any feed store) and then a sock    NAILS  · Trim or file your nails straight across and leave them a little long.  If you have thick fungal nails, a nail  tool or dremel tool works well.    · If you have an ingrown nail with reddened skin, soak it (as mentioned above and call your doctor).    SHOES  · Keep your shoes in good repair and wear any special inserts or diabetic shoes as prescribed.    · If you have a problem with a special diabetic shoe and/or insert, such as rubbing, go back to where you got the diabetic shoe/insert as soon as possible.    · If you are in \"regular\" shoes, make sure they fit.  Shoes with soft, padded soles, rounded toes, and good support are best.    THINGS NOT TO DO  · DO NOT go barefoot    · DO NOT soak feet in very hot water.    · DO NOT soak feet in anything other than water with soap or Epsom Salts (NO bleach, turpentine, gasoline, etc.).          "

## 2019-09-23 NOTE — PROGRESS NOTES
Post-op (3 weeks f/u; 9 weeks status post Left Foot I&D Wound and Excision Left 4th Metatarsal 07/23/2019)      Scott [place] is 8 weeks status post I&D left foot, excision fourth metatarsal, 7/23/2019.. He reports no fever, chills.  He reports pain is well controlled.  They have been taking aspirin for DVT prophylaxis.  They have been non weight bearing.      Past Surgical History:   Procedure Laterality Date   • AMPUTATION DIGIT Left 7/23/2019    Procedure: excision left 4th metatarsal;  Surgeon: Shannan Kraft MD;  Location: [facility] OR;  Service: Orthopedics   • APPENDECTOMY     • INCISION AND DRAINAGE LEG Left 7/23/2019    Procedure: I&D left foot wound;  Surgeon: Shannan Kraft MD;  Location:  [facility] OR;  Service: Orthopedics       There were no vitals taken for this visit.       Left foot no erythema, no drainage, no sign of infection, normal post op swelling left foot still has an open wound between the third and fifth toes, but it is superficial    ordered and reviewed x-rays today    Assessment and Plan:   1. Chronic multifocal osteomyelitis of left foot (CMS/HCC)  Slowly healing.  But no signs of ongoing osteomyelitis.  Radiographs do not show any new erosions.  I think he may put weight on the heel in a half shoe.  Continue dressing on the open area.  I will see him in 6 weeks, sooner if anything gets worse  - XR Foot 3+ View Left      2.  Poor diabetes control-he reports it is improving.  No change in dense neuropathy

## 2019-10-09 ENCOUNTER — LAB REQUISITION (OUTPATIENT)
Dept: LAB | Facility: HOSPITAL | Age: 64
End: 2019-10-09

## 2019-10-09 DIAGNOSIS — Z00.00 ROUTINE GENERAL MEDICAL EXAMINATION AT A HEALTH CARE FACILITY: ICD-10-CM

## 2019-10-09 LAB
ALBUMIN SERPL-MCNC: 4.3 G/DL (ref 3.5–5.2)
ALBUMIN/GLOB SERPL: 1.5 G/DL
ALP SERPL-CCNC: 69 U/L (ref 39–117)
ALT SERPL W P-5'-P-CCNC: 17 U/L (ref 1–41)
ANION GAP SERPL CALCULATED.3IONS-SCNC: 11 MMOL/L (ref 5–15)
AST SERPL-CCNC: 20 U/L (ref 1–40)
BASOPHILS # BLD AUTO: 0.05 10*3/MM3 (ref 0–0.2)
BASOPHILS NFR BLD AUTO: 0.9 % (ref 0–1.5)
BILIRUB SERPL-MCNC: 0.4 MG/DL (ref 0.2–1.2)
BUN BLD-MCNC: 29 MG/DL (ref 8–23)
BUN/CREAT SERPL: 25.4 (ref 7–25)
CALCIUM SPEC-SCNC: 9.8 MG/DL (ref 8.6–10.5)
CHLORIDE SERPL-SCNC: 107 MMOL/L (ref 98–107)
CK SERPL-CCNC: 49 U/L (ref 20–200)
CO2 SERPL-SCNC: 25 MMOL/L (ref 22–29)
CREAT BLD-MCNC: 1.14 MG/DL (ref 0.76–1.27)
DEPRECATED RDW RBC AUTO: 45.4 FL (ref 37–54)
EOSINOPHIL # BLD AUTO: 0.18 10*3/MM3 (ref 0–0.4)
EOSINOPHIL NFR BLD AUTO: 3.4 % (ref 0.3–6.2)
ERYTHROCYTE [DISTWIDTH] IN BLOOD BY AUTOMATED COUNT: 13.5 % (ref 12.3–15.4)
ERYTHROCYTE [SEDIMENTATION RATE] IN BLOOD: 21 MM/HR (ref 0–20)
GFR SERPL CREATININE-BSD FRML MDRD: 65 ML/MIN/1.73
GLOBULIN UR ELPH-MCNC: 2.8 GM/DL
GLUCOSE BLD-MCNC: 143 MG/DL (ref 65–99)
HCT VFR BLD AUTO: 39.1 % (ref 37.5–51)
HGB BLD-MCNC: 12.7 G/DL (ref 13–17.7)
IMM GRANULOCYTES # BLD AUTO: 0.01 10*3/MM3 (ref 0–0.05)
IMM GRANULOCYTES NFR BLD AUTO: 0.2 % (ref 0–0.5)
LYMPHOCYTES # BLD AUTO: 1.99 10*3/MM3 (ref 0.7–3.1)
LYMPHOCYTES NFR BLD AUTO: 37.5 % (ref 19.6–45.3)
MCH RBC QN AUTO: 29.7 PG (ref 26.6–33)
MCHC RBC AUTO-ENTMCNC: 32.5 G/DL (ref 31.5–35.7)
MCV RBC AUTO: 91.4 FL (ref 79–97)
MONOCYTES # BLD AUTO: 0.4 10*3/MM3 (ref 0.1–0.9)
MONOCYTES NFR BLD AUTO: 7.5 % (ref 5–12)
NEUTROPHILS # BLD AUTO: 2.67 10*3/MM3 (ref 1.7–7)
NEUTROPHILS NFR BLD AUTO: 50.5 % (ref 42.7–76)
NRBC BLD AUTO-RTO: 0 /100 WBC (ref 0–0.2)
PLATELET # BLD AUTO: 287 10*3/MM3 (ref 140–450)
PMV BLD AUTO: 10.3 FL (ref 6–12)
POTASSIUM BLD-SCNC: 4.9 MMOL/L (ref 3.5–5.2)
PROT SERPL-MCNC: 7.1 G/DL (ref 6–8.5)
RBC # BLD AUTO: 4.28 10*6/MM3 (ref 4.14–5.8)
SODIUM BLD-SCNC: 143 MMOL/L (ref 136–145)
WBC NRBC COR # BLD: 5.3 10*3/MM3 (ref 3.4–10.8)

## 2019-10-09 PROCEDURE — 80053 COMPREHEN METABOLIC PANEL: CPT

## 2019-10-09 PROCEDURE — 82550 ASSAY OF CK (CPK): CPT

## 2019-10-09 PROCEDURE — 85025 COMPLETE CBC W/AUTO DIFF WBC: CPT

## 2019-10-09 PROCEDURE — 85652 RBC SED RATE AUTOMATED: CPT

## 2019-10-23 ENCOUNTER — OFFICE VISIT (OUTPATIENT)
Dept: ORTHOPEDIC SURGERY | Facility: CLINIC | Age: 64
End: 2019-10-23

## 2019-10-23 DIAGNOSIS — E11.65 UNCONTROLLED TYPE 2 DIABETES MELLITUS WITH HYPERGLYCEMIA (HCC): Primary | ICD-10-CM

## 2019-10-23 PROCEDURE — 99212 OFFICE O/P EST SF 10 MIN: CPT | Performed by: ORTHOPAEDIC SURGERY

## 2019-10-23 NOTE — PROGRESS NOTES
"Post-op (1 month recheck - status post Left Foot I&D Wound and Excision Left 4th Metatarsal 07/23/2019)      Scott Benz is 3 months status post amputate left 4th toe and metatarsal, 7/23/19. He reports no fever, chills.  He reports pain is resolved.  They have been taking aspirin for DVT prophylaxis.  They have been non weight bearing.      Past Surgical History:   Procedure Laterality Date   • AMPUTATION DIGIT Left 7/23/2019    Procedure: excision left 4th metatarsal;  Surgeon: Shannan Kraft MD;  Location: [facility] OR;  Service: Orthopedics   • APPENDECTOMY     • INCISION AND DRAINAGE LEG Left 7/23/2019    Procedure: I&D left foot wound;  Surgeon: Shannan Kraft MD;  Location:  [facility] OR;  Service: Orthopedics       There were no vitals taken for this visit.     Left foot:still has open area distally, no erythema, no drainage            Right foot- dense neuropathy but no calluses, no ulcers, no pre-ulcerous lesions    Assessment and Plan:   1. Uncontrolled type 2 diabetes mellitus with hyperglycemia (CMS/HCC)  Still not fully healed, he reports better glucose control.  He reports he is not walking on it, but also described episode of \"sugar was 60 and walking down feng holding on to the walls to get juice\".  High risk for BKA.  Continue wet to dry dressings, see me in 6-8 weeks, non-wt bearing 3 views of foot          "

## 2019-11-06 ENCOUNTER — LAB REQUISITION (OUTPATIENT)
Dept: LAB | Facility: HOSPITAL | Age: 64
End: 2019-11-06

## 2019-11-06 DIAGNOSIS — Z00.00 ROUTINE GENERAL MEDICAL EXAMINATION AT A HEALTH CARE FACILITY: ICD-10-CM

## 2019-11-06 LAB
ALBUMIN SERPL-MCNC: 4.3 G/DL (ref 3.5–5.2)
ALBUMIN/GLOB SERPL: 1.3 G/DL
ALP SERPL-CCNC: 61 U/L (ref 39–117)
ALT SERPL W P-5'-P-CCNC: 12 U/L (ref 1–41)
ANION GAP SERPL CALCULATED.3IONS-SCNC: 14 MMOL/L (ref 5–15)
AST SERPL-CCNC: 17 U/L (ref 1–40)
BASOPHILS # BLD AUTO: 0.03 10*3/MM3 (ref 0–0.2)
BASOPHILS NFR BLD AUTO: 0.5 % (ref 0–1.5)
BILIRUB SERPL-MCNC: 0.4 MG/DL (ref 0.2–1.2)
BUN BLD-MCNC: 27 MG/DL (ref 8–23)
BUN/CREAT SERPL: 24.8 (ref 7–25)
CALCIUM SPEC-SCNC: 9 MG/DL (ref 8.6–10.5)
CHLORIDE SERPL-SCNC: 102 MMOL/L (ref 98–107)
CO2 SERPL-SCNC: 25 MMOL/L (ref 22–29)
CREAT BLD-MCNC: 1.09 MG/DL (ref 0.76–1.27)
CRP SERPL-MCNC: 0.13 MG/DL (ref 0–0.5)
DEPRECATED RDW RBC AUTO: 43.3 FL (ref 37–54)
EOSINOPHIL # BLD AUTO: 0.29 10*3/MM3 (ref 0–0.4)
EOSINOPHIL NFR BLD AUTO: 5.1 % (ref 0.3–6.2)
ERYTHROCYTE [DISTWIDTH] IN BLOOD BY AUTOMATED COUNT: 13.2 % (ref 12.3–15.4)
ERYTHROCYTE [SEDIMENTATION RATE] IN BLOOD: 21 MM/HR (ref 0–20)
GFR SERPL CREATININE-BSD FRML MDRD: 68 ML/MIN/1.73
GLOBULIN UR ELPH-MCNC: 3.2 GM/DL
GLUCOSE BLD-MCNC: 200 MG/DL (ref 65–99)
HCT VFR BLD AUTO: 37.6 % (ref 37.5–51)
HGB BLD-MCNC: 12 G/DL (ref 13–17.7)
IMM GRANULOCYTES # BLD AUTO: 0.01 10*3/MM3 (ref 0–0.05)
IMM GRANULOCYTES NFR BLD AUTO: 0.2 % (ref 0–0.5)
LYMPHOCYTES # BLD AUTO: 2.33 10*3/MM3 (ref 0.7–3.1)
LYMPHOCYTES NFR BLD AUTO: 41.3 % (ref 19.6–45.3)
MCH RBC QN AUTO: 28.8 PG (ref 26.6–33)
MCHC RBC AUTO-ENTMCNC: 31.9 G/DL (ref 31.5–35.7)
MCV RBC AUTO: 90.4 FL (ref 79–97)
MONOCYTES # BLD AUTO: 0.43 10*3/MM3 (ref 0.1–0.9)
MONOCYTES NFR BLD AUTO: 7.6 % (ref 5–12)
NEUTROPHILS # BLD AUTO: 2.55 10*3/MM3 (ref 1.7–7)
NEUTROPHILS NFR BLD AUTO: 45.3 % (ref 42.7–76)
NRBC BLD AUTO-RTO: 0 /100 WBC (ref 0–0.2)
PLATELET # BLD AUTO: 246 10*3/MM3 (ref 140–450)
PMV BLD AUTO: 10.5 FL (ref 6–12)
POTASSIUM BLD-SCNC: 4.2 MMOL/L (ref 3.5–5.2)
PROT SERPL-MCNC: 7.5 G/DL (ref 6–8.5)
RBC # BLD AUTO: 4.16 10*6/MM3 (ref 4.14–5.8)
SODIUM BLD-SCNC: 141 MMOL/L (ref 136–145)
WBC NRBC COR # BLD: 5.64 10*3/MM3 (ref 3.4–10.8)

## 2019-11-06 PROCEDURE — 85652 RBC SED RATE AUTOMATED: CPT | Performed by: INTERNAL MEDICINE

## 2019-11-06 PROCEDURE — 85025 COMPLETE CBC W/AUTO DIFF WBC: CPT | Performed by: INTERNAL MEDICINE

## 2019-11-06 PROCEDURE — 86140 C-REACTIVE PROTEIN: CPT | Performed by: INTERNAL MEDICINE

## 2019-11-06 PROCEDURE — 80053 COMPREHEN METABOLIC PANEL: CPT | Performed by: INTERNAL MEDICINE

## 2019-12-04 ENCOUNTER — OFFICE VISIT (OUTPATIENT)
Dept: ORTHOPEDIC SURGERY | Facility: CLINIC | Age: 64
End: 2019-12-04

## 2019-12-04 VITALS — BODY MASS INDEX: 25.76 KG/M2 | HEIGHT: 68 IN | WEIGHT: 169.97 LBS | HEART RATE: 94 BPM | OXYGEN SATURATION: 99 %

## 2019-12-04 DIAGNOSIS — E11.621 DIABETIC ULCER OF TOE OF LEFT FOOT ASSOCIATED WITH TYPE 2 DIABETES MELLITUS, LIMITED TO BREAKDOWN OF SKIN (HCC): ICD-10-CM

## 2019-12-04 DIAGNOSIS — E11.65 UNCONTROLLED TYPE 2 DIABETES MELLITUS WITH HYPERGLYCEMIA (HCC): Primary | ICD-10-CM

## 2019-12-04 DIAGNOSIS — Z09 SURGERY FOLLOW-UP: ICD-10-CM

## 2019-12-04 DIAGNOSIS — L97.521 DIABETIC ULCER OF TOE OF LEFT FOOT ASSOCIATED WITH TYPE 2 DIABETES MELLITUS, LIMITED TO BREAKDOWN OF SKIN (HCC): ICD-10-CM

## 2019-12-04 PROCEDURE — 99213 OFFICE O/P EST LOW 20 MIN: CPT | Performed by: ORTHOPAEDIC SURGERY

## 2019-12-04 RX ORDER — ASPIRIN 325 MG
162 TABLET ORAL DAILY
COMMUNITY

## 2019-12-04 NOTE — PROGRESS NOTES
"Follow-up (6 week follow up - 4 months status post Left Foot I&D Wound and Excision Left 4th Metatarsal 07/23/2019)      Scott Benz is 4 months status post left foot I&D, amputation fourth metatarsal and toe, 7/23/2019.  Since last visit he has had a stroke affecting the left side of his body.  He is in physical therapy for this.  His left foot wound has finally fully healed, he had his last appointment with infectious disease.  However he has a new ulcer on the tip of the second toe, he is not certain how it started, he thinks it happened today.  He thinks he might have hit the toe on something.  He covered it with a Band-Aid.    Past Surgical History:   Procedure Laterality Date   • AMPUTATION DIGIT Left 7/23/2019    Procedure: excision left 4th metatarsal;  Surgeon: Shannan Kraft MD;  Location:  [facility] OR;  Service: Orthopedics   • APPENDECTOMY     • INCISION AND DRAINAGE LEG Left 7/23/2019    Procedure: I&D left foot wound;  Surgeon: Shannan Kraft MD;  Location:  [facility] OR;  Service: Orthopedics       Pulse 94   Ht 172.7 cm (67.99\")   Wt 77.1 kg (169 lb 15.6 oz)   SpO2 99%   BMI 25.85 kg/m²      Right foot has no diabetic ulcers, no pre-ulcerous lesions, no change in dense neuropathy    Left foot the fourth ray amputation site is finally healed, but new diabetic ulcer on the tip of the second toe.  It is superficial, about 3 mm, no signs of infection.  No change in dense neuropathy, no other ulcers no pre-ulcerous lesions.        ordered and reviewed x-rays today    Assessment and Plan:   1. Diabetic ulcer of toe of left foot associated with type 2 diabetes mellitus, limited to breakdown of skin (CMS/HCC)  New ulcer on the left foot, no signs of infection.  We put him in a postop shoe today to keep all pressure off the area.  With respect to the foot ultimately going back into a closed shoe he is high risk.  I gave him a prescription for diabetic shoes and inserts.  He is not to put a close shoe on the " left side until the ulcer on the tip of the toe heals.  If he puts his shoe on the ulcer will get worse and he will end up with that toe being amputated.  Now that the fourth ray amputation site is healed I showed him how to use lambswool between all of the toes to prevent pressure, prevent rubbing and prevent ulceration.  He remains high risk because of the dense neuropathy and decreased small vessel circulation.  I will see him in 6 to 8 weeks sooner if the toe gets worse.    2. Uncontrolled type 2 diabetes mellitus with hyperglycemia (CMS/[facility])  We talked about the importance of good glucose control to decrease his risk for complications including recurrent stroke

## 2020-01-22 ENCOUNTER — OFFICE VISIT (OUTPATIENT)
Dept: ORTHOPEDIC SURGERY | Facility: CLINIC | Age: 65
End: 2020-01-22

## 2020-01-22 VITALS — HEART RATE: 113 BPM | OXYGEN SATURATION: 97 % | WEIGHT: 169.75 LBS | BODY MASS INDEX: 25.73 KG/M2 | HEIGHT: 68 IN

## 2020-01-22 DIAGNOSIS — R09.89 DECREASED PULSES IN FEET: ICD-10-CM

## 2020-01-22 DIAGNOSIS — E11.65 UNCONTROLLED TYPE 2 DIABETES MELLITUS WITH HYPERGLYCEMIA (HCC): Primary | ICD-10-CM

## 2020-01-22 PROCEDURE — 99213 OFFICE O/P EST LOW 20 MIN: CPT | Performed by: ORTHOPAEDIC SURGERY

## 2020-01-22 NOTE — PATIENT INSTRUCTIONS
"Education    Diabetic Foot Care / Neuropathic Foot Care    SKIN  · Remove shoes and socks and look at the feet every morning and night.         · Blisters - clean it with peroxide or alcohol.  DO NOT put a shoe back on until it is healed.  DO NOT WALK ON THE FOOT.  If it is red or looks infected, call your doctor.    · Callus - sand calluses daily, it works better on dry skin.  A PED EGG is the best tool, or file or a pumice stone (available [facility]) using a back and forth motion over the callus.    · Ingrown nail - soak it in soapy water and call your doctor.    · Ulcers or sores on the foot - DO NOT PUT A SHOE ON, DO NOT WALK ON THE FOOT, go to the doctor who looks at your feet.    · Moisturize the feet every night.  An easy method: The foot with a thin layer of Vaseline or Bag [place] (you can find this at [employer], any feed store) and then a sock    NAILS  · Trim or file your nails straight across and leave them a little long.  If you have thick fungal nails, a nail  tool or dremel tool works well.    · If you have an ingrown nail with reddened skin, soak it (as mentioned above and call your doctor).    SHOES  · Keep your shoes in good repair and wear any special inserts or diabetic shoes as prescribed.    · If you have a problem with a special diabetic shoe and/or insert, such as rubbing, go back to where you got the diabetic shoe/insert as soon as possible.    · If you are in \"regular\" shoes, make sure they fit.  Shoes with soft, padded soles, rounded toes, and good support are best.    THINGS NOT TO DO  · DO NOT go barefoot    · DO NOT soak feet in very hot water.    · DO NOT soak feet in anything other than water with soap or Epsom Salts (NO bleach, turpentine, gasoline, etc.).          "

## 2020-01-22 NOTE — PROGRESS NOTES
"ESTABLISHED PATIENT    Patient: Scott Benz  : 1955    Primary Care Provider: Ottoniel Florez MD    Requesting Provider: As above    Follow-up (7 week recheck - status post Left Foot I&D Wound and Excision Left 4th Metatarsal 2019)      History    Chief Complaint: Diabetic foot problems    History of Present Illness: He returns for follow-up of his left fourth ray amputation, plus the new diabetic ulcer on the tip of the second toe.  That ulcer has healed.  He has not had any more signs of stroke since last visit.  He did not get his diabetic shoes and inserts.  He reports \"I have been wearing good shoes\".  I again strongly recommended that he get the diabetic shoes, he is at very high risk for amputation.  He has not been taking care of the calluses nor moisturizing his feet, and we again went over diabetic foot care and prevention in great detail.  His wife is with him.  He reports that he is going to have carpal tunnel surgery.  On exam his hands have significant intrinsic wasting, not really consistent with carpal tunnel.  I encouraged him to consider seeing a hand specialist, I gave him names of hand specialist in [facility].    Current Outpatient Medications on File Prior to Visit   Medication Sig Dispense Refill   • aspirin 325 MG tablet Take 325 mg by mouth Daily.     • Celecoxib (CELEBREX PO) Celebrex     • docusate sodium 100 MG capsule Take 100 mg by mouth 2 (Two) Times a Day As Needed (constipation). 60 each 0   • doxycycline (ADOXA) 100 MG tablet Take 1 tablet by mouth 2 (Two) Times a Day.     • gabapentin (NEURONTIN) 600 MG tablet      • HYDROcodone-acetaminophen (NORCO) 7.5-325 MG per tablet Take 1-2 tablets by mouth Every 6 (Six) Hours As Needed for Moderate Pain  (as needed for pain). 60 tablet 0   • Insulin Glargine (TOUJEO SOLOSTAR) 300 UNIT/ML solution pen-injector Inject 50 Units under the skin into the appropriate area as directed Daily.     • lisinopril (PRINIVIL,ZESTRIL) 10 MG tablet " "Take 1 tablet by mouth Daily. 30 tablet 0   • mupirocin (BACTROBAN) 2 % ointment Apply  topically to the appropriate area as directed 3 (Three) Times a Day. 30 g 5   • mupirocin (BACTROBAN) 2 % ointment Apply  topically to the appropriate area as directed Daily.     • NOVOLIN R 100 UNIT/ML injection      • ondansetron (ZOFRAN) 4 MG tablet Take 1 tablet by mouth Every 6 (Six) Hours As Needed for Nausea or Vomiting. 30 tablet 0   • TRUEPLUS INSULIN SYRINGE 31G X 5/16\" 0.3 ML misc        No current facility-administered medications on file prior to visit.       No Known Allergies   Past Medical History:   Diagnosis Date   • Diabetes (CMS/[facility])    • Stroke (CMS/[facility]) 11/10/2019     Past Surgical History:   Procedure Laterality Date   • AMPUTATION DIGIT Left 7/23/2019    Procedure: excision left 4th metatarsal;  Surgeon: Shannan Kraft MD;  Location:  [facility] OR;  Service: Orthopedics   • APPENDECTOMY     • INCISION AND DRAINAGE LEG Left 7/23/2019    Procedure: I&D left foot wound;  Surgeon: Shannan Kraft MD;  Location:  [facility] OR;  Service: Orthopedics     Family History   Problem Relation Age of Onset   • Stroke Father    • Diabetes Father       Social History     Socioeconomic History   • Marital status:      Spouse name: Not on file   • Number of children: Not on file   • Years of education: Not on file   • Highest education level: Not on file   Tobacco Use   • Smoking status: Never Smoker   • Smokeless tobacco: Never Used   Substance and Sexual Activity   • Alcohol use: No     Frequency: Never   • Drug use: No   • Sexual activity: Defer        Review of Systems   Constitutional: Negative.    HENT: Negative.    Eyes: Negative.    Respiratory: Negative.    Cardiovascular: Negative.    Gastrointestinal: Negative.    Endocrine: Negative.    Genitourinary: Negative.    Musculoskeletal: Positive for arthralgias.   Skin: Negative.    Allergic/Immunologic: Negative.    Neurological: Negative.    Hematological: " "Negative.    Psychiatric/Behavioral: Negative.        The following portions of the patient's history were reviewed and updated as appropriate: allergies, current medications, past family history, past medical history, past social history, past surgical history and problem list.    Physical Exam:   Pulse 113   Ht 172.7 cm (67.99\")   Wt 77 kg (169 lb 12.1 oz)   SpO2 97%   BMI 25.82 kg/m²   GENERAL: Body habitus: normal weight for height    Lower extremity edema: Left: trace; Right: trace    Gait: Slightly unsteady     Mental Status:  awake and alert; oriented to person, place, and time  MSK:  [place]ia:  Right:  non tender; Left:  non tender        Ankle:  Right: non tender; Left:  non tender        Foot:  Right:  No ulcers no pre-ulcerous lesions, a few small calluses that are not pre-ulcerous; Left:  Callus on the tip of the second toe but the ulcer is healed, amputation site is healed, no pre-ulcerous lesions    NEURO Sensation:  absent    Medical Decision Making    Data Review:   none    Assessment/Plan/Diagnosis/Treatment Options:   1. Uncontrolled type 2 diabetes mellitus with hyperglycemia (CMS/[facility])  The ulcer on the left second toe is healed but he remains at very high risk.  I gave him a banana pad to protect the toe.  I really would recommend he obtain the diabetic shoes and inserts.  We also discussed improving his glucose control.  As above we discussed seeing a hand surgeon for another opinion.  I will see him as needed    2. Decreased pulses in feet  No change                            "

## 2021-11-08 ENCOUNTER — TRANSCRIBE ORDERS (OUTPATIENT)
Dept: LAB | Facility: HOSPITAL | Age: 66
End: 2021-11-08

## 2021-11-08 ENCOUNTER — LAB (OUTPATIENT)
Dept: LAB | Facility: HOSPITAL | Age: 66
End: 2021-11-08

## 2021-11-08 DIAGNOSIS — M86.172 ACUTE OSTEOMYELITIS OF LEFT ANKLE OR FOOT (HCC): ICD-10-CM

## 2021-11-08 DIAGNOSIS — T81.49XD INFECTION FOLLOWING A PROCEDURE, OTHER SURGICAL SITE, SUBSEQUENT ENCOUNTER: ICD-10-CM

## 2021-11-08 DIAGNOSIS — M86.672 CHRONIC OSTEOMYELITIS OF HINDFOOT, LEFT (HCC): ICD-10-CM

## 2021-11-08 DIAGNOSIS — T81.49XD INFECTION FOLLOWING A PROCEDURE, OTHER SURGICAL SITE, SUBSEQUENT ENCOUNTER: Primary | ICD-10-CM

## 2022-06-14 ENCOUNTER — HOSPITAL ENCOUNTER (EMERGENCY)
Facility: HOSPITAL | Age: 67
Discharge: NURSING, REHAB OR LONG-TERM CARE | End: 2022-06-14
Attending: EMERGENCY MEDICINE | Admitting: EMERGENCY MEDICINE

## 2022-06-14 ENCOUNTER — APPOINTMENT (OUTPATIENT)
Dept: GENERAL RADIOLOGY | Facility: HOSPITAL | Age: 67
End: 2022-06-14

## 2022-06-14 VITALS
TEMPERATURE: 97.7 F | HEART RATE: 88 BPM | DIASTOLIC BLOOD PRESSURE: 68 MMHG | OXYGEN SATURATION: 94 % | WEIGHT: 160 LBS | HEIGHT: 67 IN | BODY MASS INDEX: 25.11 KG/M2 | RESPIRATION RATE: 14 BRPM | SYSTOLIC BLOOD PRESSURE: 120 MMHG

## 2022-06-14 DIAGNOSIS — I49.9 CARDIAC ARRHYTHMIA, UNSPECIFIED CARDIAC ARRHYTHMIA TYPE: Primary | ICD-10-CM

## 2022-06-14 DIAGNOSIS — I49.8 BIGEMINY: ICD-10-CM

## 2022-06-14 DIAGNOSIS — Z89.619 HISTORY OF LEG AMPUTATION: ICD-10-CM

## 2022-06-14 LAB
ALBUMIN SERPL-MCNC: 3.2 G/DL (ref 3.5–5.2)
ALBUMIN/GLOB SERPL: 1 G/DL
ALP SERPL-CCNC: 102 U/L (ref 39–117)
ALT SERPL W P-5'-P-CCNC: 9 U/L (ref 1–41)
ANION GAP SERPL CALCULATED.3IONS-SCNC: 10 MMOL/L (ref 5–15)
AST SERPL-CCNC: 13 U/L (ref 1–40)
BASOPHILS # BLD AUTO: 0.14 10*3/MM3 (ref 0–0.2)
BASOPHILS NFR BLD AUTO: 1.3 % (ref 0–1.5)
BILIRUB SERPL-MCNC: <0.2 MG/DL (ref 0–1.2)
BUN SERPL-MCNC: 30 MG/DL (ref 8–23)
BUN/CREAT SERPL: 29.7 (ref 7–25)
CALCIUM SPEC-SCNC: 8.5 MG/DL (ref 8.6–10.5)
CHLORIDE SERPL-SCNC: 97 MMOL/L (ref 98–107)
CO2 SERPL-SCNC: 25 MMOL/L (ref 22–29)
CREAT SERPL-MCNC: 1.01 MG/DL (ref 0.76–1.27)
DEPRECATED RDW RBC AUTO: 57 FL (ref 37–54)
EGFRCR SERPLBLD CKD-EPI 2021: 81.5 ML/MIN/1.73
EOSINOPHIL # BLD AUTO: 1.12 10*3/MM3 (ref 0–0.4)
EOSINOPHIL NFR BLD AUTO: 10.6 % (ref 0.3–6.2)
ERYTHROCYTE [DISTWIDTH] IN BLOOD BY AUTOMATED COUNT: 18.5 % (ref 12.3–15.4)
GLOBULIN UR ELPH-MCNC: 3.2 GM/DL
GLUCOSE SERPL-MCNC: 205 MG/DL (ref 65–99)
HCT VFR BLD AUTO: 28.2 % (ref 37.5–51)
HGB BLD-MCNC: 8.8 G/DL (ref 13–17.7)
HOLD SPECIMEN: NORMAL
IMM GRANULOCYTES # BLD AUTO: 0.04 10*3/MM3 (ref 0–0.05)
IMM GRANULOCYTES NFR BLD AUTO: 0.4 % (ref 0–0.5)
LYMPHOCYTES # BLD AUTO: 3.17 10*3/MM3 (ref 0.7–3.1)
LYMPHOCYTES NFR BLD AUTO: 29.9 % (ref 19.6–45.3)
MAGNESIUM SERPL-MCNC: 2.4 MG/DL (ref 1.6–2.4)
MCH RBC QN AUTO: 28.5 PG (ref 26.6–33)
MCHC RBC AUTO-ENTMCNC: 31.2 G/DL (ref 31.5–35.7)
MCV RBC AUTO: 91.3 FL (ref 79–97)
MONOCYTES # BLD AUTO: 0.55 10*3/MM3 (ref 0.1–0.9)
MONOCYTES NFR BLD AUTO: 5.2 % (ref 5–12)
NEUTROPHILS NFR BLD AUTO: 5.57 10*3/MM3 (ref 1.7–7)
NEUTROPHILS NFR BLD AUTO: 52.6 % (ref 42.7–76)
NRBC BLD AUTO-RTO: 0 /100 WBC (ref 0–0.2)
NT-PROBNP SERPL-MCNC: 1444 PG/ML (ref 0–900)
PLATELET # BLD AUTO: 364 10*3/MM3 (ref 140–450)
PMV BLD AUTO: 9.2 FL (ref 6–12)
POTASSIUM SERPL-SCNC: 4.5 MMOL/L (ref 3.5–5.2)
PROT SERPL-MCNC: 6.4 G/DL (ref 6–8.5)
QT INTERVAL: 376 MS
QT INTERVAL: 394 MS
QTC INTERVAL: 464 MS
QTC INTERVAL: 479 MS
RBC # BLD AUTO: 3.09 10*6/MM3 (ref 4.14–5.8)
SODIUM SERPL-SCNC: 132 MMOL/L (ref 136–145)
TROPONIN T SERPL-MCNC: 0.03 NG/ML (ref 0–0.03)
TROPONIN T SERPL-MCNC: 0.03 NG/ML (ref 0–0.03)
TSH SERPL DL<=0.05 MIU/L-ACNC: 2.36 UIU/ML (ref 0.27–4.2)
WBC NRBC COR # BLD: 10.59 10*3/MM3 (ref 3.4–10.8)
WHOLE BLOOD HOLD COAG: NORMAL
WHOLE BLOOD HOLD SPECIMEN: NORMAL

## 2022-06-14 PROCEDURE — 84484 ASSAY OF TROPONIN QUANT: CPT | Performed by: EMERGENCY MEDICINE

## 2022-06-14 PROCEDURE — 84484 ASSAY OF TROPONIN QUANT: CPT | Performed by: NURSE PRACTITIONER

## 2022-06-14 PROCEDURE — 80053 COMPREHEN METABOLIC PANEL: CPT | Performed by: EMERGENCY MEDICINE

## 2022-06-14 PROCEDURE — 99284 EMERGENCY DEPT VISIT MOD MDM: CPT

## 2022-06-14 PROCEDURE — 93005 ELECTROCARDIOGRAM TRACING: CPT

## 2022-06-14 PROCEDURE — 36415 COLL VENOUS BLD VENIPUNCTURE: CPT

## 2022-06-14 PROCEDURE — 85025 COMPLETE CBC W/AUTO DIFF WBC: CPT | Performed by: EMERGENCY MEDICINE

## 2022-06-14 PROCEDURE — 93005 ELECTROCARDIOGRAM TRACING: CPT | Performed by: NURSE PRACTITIONER

## 2022-06-14 PROCEDURE — 83735 ASSAY OF MAGNESIUM: CPT | Performed by: EMERGENCY MEDICINE

## 2022-06-14 PROCEDURE — 83880 ASSAY OF NATRIURETIC PEPTIDE: CPT | Performed by: EMERGENCY MEDICINE

## 2022-06-14 PROCEDURE — 93005 ELECTROCARDIOGRAM TRACING: CPT | Performed by: EMERGENCY MEDICINE

## 2022-06-14 PROCEDURE — 84443 ASSAY THYROID STIM HORMONE: CPT | Performed by: EMERGENCY MEDICINE

## 2022-06-14 PROCEDURE — 71045 X-RAY EXAM CHEST 1 VIEW: CPT

## 2022-06-14 RX ORDER — LANOLIN ALCOHOL/MO/W.PET/CERES
3 CREAM (GRAM) TOPICAL
COMMUNITY

## 2022-06-14 RX ORDER — GLIMEPIRIDE 4 MG/1
4 TABLET ORAL
COMMUNITY

## 2022-06-14 RX ORDER — TROLAMINE SALICYLATE 10 G/100G
1 CREAM TOPICAL AS NEEDED
COMMUNITY

## 2022-06-14 RX ORDER — ENOXAPARIN SODIUM 100 MG/ML
40 INJECTION SUBCUTANEOUS EVERY 12 HOURS SCHEDULED
COMMUNITY

## 2022-06-14 RX ORDER — ANORECTAL OINTMENT 15.7; .44; 24; 20.6 G/100G; G/100G; G/100G; G/100G
1 OINTMENT TOPICAL 2 TIMES DAILY
COMMUNITY

## 2022-06-14 RX ORDER — UREA 10 %
LOTION (ML) TOPICAL
COMMUNITY

## 2022-06-14 RX ORDER — NITROGLYCERIN 0.4 MG/1
0.4 TABLET SUBLINGUAL
COMMUNITY

## 2022-06-14 RX ORDER — TIZANIDINE 2 MG/1
2 TABLET ORAL NIGHTLY PRN
COMMUNITY

## 2022-06-14 RX ORDER — INSULIN ASPART 100 [IU]/ML
INJECTION, SOLUTION INTRAVENOUS; SUBCUTANEOUS 3 TIMES DAILY
COMMUNITY

## 2022-06-14 RX ORDER — HYDRALAZINE HYDROCHLORIDE 10 MG/1
10 TABLET, FILM COATED ORAL 3 TIMES DAILY PRN
COMMUNITY

## 2022-06-14 RX ORDER — LOPERAMIDE HYDROCHLORIDE 2 MG/1
2 CAPSULE ORAL 4 TIMES DAILY PRN
COMMUNITY

## 2022-06-14 RX ORDER — ATORVASTATIN CALCIUM 20 MG/1
20 TABLET, FILM COATED ORAL DAILY
COMMUNITY

## 2022-06-14 RX ORDER — ACETAMINOPHEN 500 MG
500 TABLET ORAL EVERY 6 HOURS PRN
COMMUNITY

## 2022-06-14 RX ORDER — CLONIDINE HYDROCHLORIDE 0.1 MG/1
0.1 TABLET ORAL EVERY 6 HOURS PRN
COMMUNITY

## 2022-06-14 RX ORDER — TRAZODONE HYDROCHLORIDE 50 MG/1
12.5 TABLET ORAL NIGHTLY
COMMUNITY

## 2022-06-14 RX ORDER — FERROUS SULFATE 325(65) MG
325 TABLET ORAL
COMMUNITY

## 2022-06-14 RX ORDER — SODIUM CHLORIDE 0.9 % (FLUSH) 0.9 %
10 SYRINGE (ML) INJECTION AS NEEDED
Status: DISCONTINUED | OUTPATIENT
Start: 2022-06-14 | End: 2022-06-14 | Stop reason: HOSPADM

## 2022-06-14 RX ORDER — FAMOTIDINE 20 MG/1
20 TABLET, FILM COATED ORAL 2 TIMES DAILY
COMMUNITY

## 2022-06-14 NOTE — CASE MANAGEMENT/SOCIAL WORK
Continued Stay Note  [facility]     Patient Name: Scott Benz  MRN: [id number]  Today's Date: 6/14/2022    Admit Date: 6/14/2022     Discharge Plan     Row Name 06/14/22 1630       Plan    Plan SW    Plan Comments SW’er spoke with Andreas Henao [phone] no availability till 11am tomorrow:(scheduled confirmation number [id number]; while looking for other transportation options; please cancelled Andreas if transportation option found before 6/15 at 11am;  ). SW’er attempted to contact  Ambulance services who explained they are on a route from [place] and it depends on when they get back. SW’er spoke with RN who explained she will check with  Ambulance later tonight.    6/15 @9am: SW'er cancelled cailber               Discharge Codes    No documentation.                     LATHA Kitchen (Kay)

## 2022-06-14 NOTE — ED PROVIDER NOTES
Subjective   Pleasant patient presents to the ER with concern for arrhythmia.  He is asymptomatic and always has been.  From what I can  on the history the patient was having routine vital signs taken today he was found to be bradycardic but was asymptomatic.  Is reported that his heart was in the 50s or 60s.  They did an EKG which showed an arrhythmia and was sent to the ER for further evaluation.  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  He tells me rehab is going well.  He denies any chest pain difficulty breathing or fever.  2 family was at the bedside his son and his wife.  He lives in [place].  He denies any cardiac history or ever having chest pain dizziness etc.      Illness  Location:  Arrhythmia found on EKG  Severity:  Unable to specify  Onset quality:  Unable to specify  Duration: Found today.  Timing:  Constant  Progression:  Unchanged  Chronicity:  New  Relieved by:  Nothing  Worsened by:  Nothing  Associated symptoms: no abdominal pain, no chest pain, no congestion, no cough, no diarrhea, no ear pain, no fever, no nausea, no shortness of breath, no sore throat, no vomiting and no wheezing        Review of Systems   Constitutional: Negative for chills, diaphoresis and fever.   HENT: Negative for congestion, ear pain and sore throat.    Respiratory: Negative for cough, choking, chest tightness, shortness of breath and wheezing.    Cardiovascular: Negative for chest pain and leg swelling.   Gastrointestinal: Negative for abdominal distention, abdominal pain, anal bleeding, blood in stool, constipation, diarrhea, nausea and vomiting.   Genitourinary: Negative for difficulty urinating, dysuria, flank pain, frequency, hematuria and urgency.   All other systems reviewed and are negative.      Past Medical History:   Diagnosis Date   • Diabetes (HCC)    • Stroke (HCC) 11/10/2019       No Known Allergies    Past Surgical History:    Procedure Laterality Date   • ABOVE KNEE LEG AMPUTATION     • AMPUTATION DIGIT Left 07/23/2019    Procedure: excision left 4th metatarsal;  Surgeon: Shannan Kraft MD;  Location: [facility] OR;  Service: Orthopedics   • APPENDECTOMY     • INCISION AND DRAINAGE LEG Left 07/23/2019    Procedure: I&D left foot wound;  Surgeon: Shannan Kraft MD;  Location: [facility] OR;  Service: Orthopedics       Family History   Problem Relation Age of Onset   • Stroke Father    • Diabetes Father        Social History     Socioeconomic History   • Marital status:    Tobacco Use   • Smoking status: Never Smoker   • Smokeless tobacco: Never Used   Substance and Sexual Activity   • Alcohol use: No   • Drug use: No   • Sexual activity: Defer           Objective   Physical Exam  Constitutional:       Appearance: He is well-developed.   HENT:      Head: Normocephalic and atraumatic.      Right Ear: External ear normal.      Left Ear: External ear normal.      Nose: Nose normal.   Eyes:      Conjunctiva/sclera: Conjunctivae normal.      Pupils: Pupils are equal, round, and reactive to light.   Cardiovascular:      Rate and Rhythm: Normal rate and regular rhythm.      Heart sounds: Normal heart sounds.   Pulmonary:      Effort: Pulmonary effort is normal.      Breath sounds: Normal breath sounds.   Abdominal:      General: Bowel sounds are normal.      Palpations: Abdomen is soft.   Musculoskeletal:         General: Normal range of motion.      Cervical back: Normal range of motion and neck supple.      Comments: Right leg wound appears well.  Wound clean dry and intact.   Skin:     General: Skin is warm and dry.   Neurological:      Mental Status: He is alert and oriented to person, place, and time.   Psychiatric:         Behavior: Behavior normal.         Judgment: Judgment normal.         Procedures           ED Course  ED Course as of 06/14/22 1559   e Jun 14, 2022   1337 EKG and case DW Dr. Walker. I also discussed with family at  BS.  If patient continues to be asymptomatic and reassuring troponins and next EKG we will discharge back to [facility] with appropriate follow-up.  We will continue to monitor.  Family thankful and agreeable. [JM]   1543 Awaiting second troponin [JM]   1556 Reassuring work-up.  Patient still resting comfortably.  Second EKG consistent with previous.  Given his asymptomatic nature of the findings.  I plan on discharging the patient with cardiology follow-up.  The patient is thankful and agreeable.  Case discussed with Dr. Walker. [JM]      ED Course User Index  [JM] Aristeo Winchester APRN           XR Chest 1 View   Final Result   Negative single view chest       This report was finalized on 6/14/2022 1:30 PM by Aristeo Gonzales MD.                                                  [facility]    Final diagnoses:   Cardiac arrhythmia, unspecified cardiac arrhythmia type   Bigeminy   History of leg amputation (HCC)       ED Disposition  ED Disposition     ED Disposition   Discharge    Condition   Stable    Comment   --             [facility] CARDIOLOGY  [address]  [facility] [address] [phone]  Schedule an appointment as soon as possible for a visit       [facility] Emergency Department  [address] [address] [phone]    If symptoms worsen         Medication List      Changed    lisinopril 10 MG tablet  Commonly known as: PRINIVIL,ZESTRIL  Take 1 tablet by mouth Daily.  What changed: how much to take             Aristeo Winchester APRN  06/14/22 5115

## 2025-03-23 ENCOUNTER — NURSE TRIAGE (OUTPATIENT)
Dept: CALL CENTER | Facility: HOSPITAL | Age: 70
End: 2025-03-23
Payer: COMMERCIAL

## 2025-03-23 NOTE — TELEPHONE ENCOUNTER
"I need information. 1st week of Jan. saw PCP, did not know what was going on, got meds, not helping, A1C 6.5, I have been having nausea since Jan,I was seen at [facility] , not helping. I saw PCP again in March was told needs to see GI specialist. I want to be seen in [facility]. GI, Told him to have PCP send referral , he says his stomach hurts could not sleep for stomach hurting, last night. I have had abd pain for a week now and is worse last night and today, my stomach is not tender, no vomiting but nauseated mild fever. My blood sugar was low few days ago 40 meter had gone bad, EMS rechecked it. Pain in abd is 6, told him if pain persists come to be seen other wise call PCP in AM. Follow up with PCP for referral for GI of his choice.   Reason for Disposition   [1] MODERATE pain (e.g., interferes with normal activities) AND [2] pain comes and goes (cramps) AND [3] present > 24 hours  (Exception: Pain with Vomiting or Diarrhea - see that Guideline.)    Additional Information   Negative: Shock suspected (e.g., cold/pale/clammy skin, too weak to stand, low BP, rapid pulse)   Negative: Difficult to awaken or acting confused (e.g., disoriented, slurred speech)   Negative: Passed out (i.e., lost consciousness, collapsed and was not responding)   Negative: Sounds like a life-threatening emergency to the triager   Negative: Chest pain   Negative: Pain is mainly in upper abdomen  (if needed ask: \"is it mainly above the belly button?\")   Negative: Followed an abdomen (stomach) injury   Negative: Abdomen bloating or swelling are main symptoms   Negative: [1] SEVERE pain (e.g., excruciating) AND [2] present > 1 hour   Negative: [1] SEVERE pain AND [2] age > 60 years   Negative: [1] Vomiting AND [2] contains red blood or black (\"coffee ground\") material  (Exception: Few red streaks in vomit that only happened once.)   Negative: Blood in bowel movements  (Exception: Blood on surface of BM with constipation.)   Negative: " "Black or tarry bowel movements  (Exception: Chronic-unchanged black-grey BMs AND is taking iron pills or Pepto-Bismol.)   Negative: [1] Unable to urinate (or only a few drops) > 4 hours AND [2] bladder feels very full (e.g., palpable bladder or strong urge to urinate)   Negative: [1] Vomiting AND [2] contains bile (green color)   Negative: [1] Pain in the scrotum or testicle AND [2] present > 1 hour   Negative: Patient sounds very sick or weak to the triager   Negative: [1] MILD-MODERATE pain AND [2] constant AND [3] present > 2 hours   Negative: [1] Vomiting AND [2] abdomen looks much more swollen than usual   Negative: White of the eyes have turned yellow (i.e., jaundice)   Negative: Fever > 103 F (39.4 C)   Negative: [1] Fever > 101 F (38.3 C) AND [2] age > 60 years   Negative: [1] Fever > 100.0 F (37.8 C) AND [2] bedridden (e.g., CVA, chronic illness, recovering from surgery)   Negative: [1] Fever > 100.0 F (37.8 C) AND [2] diabetes mellitus or weak immune system (e.g., HIV positive, cancer chemo, splenectomy, organ transplant, chronic steroids)   Negative: [1] SEVERE pain AND [2] present < 1 hour   Negative: [1] MILD pain (e.g., does not interfere with normal activities) AND [2] pain comes and goes (cramps) [3] present > 48 hours  (Exception: This same abdominal pain is a chronic symptom recurrent or ongoing AND present > 4 weeks.)   Negative: Age > 60 years    Answer Assessment - Initial Assessment Questions  1. LOCATION: \"Where does it hurt?\"       Lower stomach  2. RADIATION: \"Does the pain shoot anywhere else?\" (e.g., chest, back)      No radiation  3. ONSET: \"When did the pain begin?\" (Minutes, hours or days ago)       Going on week  4. SUDDEN: \"Gradual or sudden onset?\"      gradual  5. PATTERN \"Does the pain come and go, or is it constant?\"     - If it comes and goes: \"How long does it last?\" \"Do you have pain now?\"      (Note: Comes and goes means the pain is intermittent. It goes away completely " "between bouts.)     - If constant: \"Is it getting better, staying the same, or getting worse?\"       (Note: Constant means the pain never goes away completely; most serious pain is constant and gets worse.)       Comes and goes  6. SEVERITY: \"How bad is the pain?\"  (e.g., Scale 1-10; mild, moderate, or severe)     - MILD (1-3): Doesn't interfere with normal activities, abdomen soft and not tender to touch.      - MODERATE (4-7): Interferes with normal activities or awakens from sleep, abdomen tender to touch.      - SEVERE (8-10): Excruciating pain, doubled over, unable to do any normal activities.        Rated 6  7. RECURRENT SYMPTOM: \"Have you ever had this type of stomach pain before?\" If Yes, ask: \"When was the last time?\" and \"What happened that time?\"       On and off  8. CAUSE: \"What do you think is causing the stomach pain?\"      unknown  9. RELIEVING/AGGRAVATING FACTORS: \"What makes it better or worse?\" (e.g., antacids, bending or twisting motion, bowel movement)    Over eating makes it worse, little food makes it better  10. OTHER SYMPTOMS: \"Do you have any other symptoms?\" (e.g., back pain, diarrhea, fever, urination pain, vomiting)        Abd pain, nausea, some constipation not bad    Protocols used: Abdominal Pain - Male-ADULT-AH    "

## 2025-03-28 ENCOUNTER — APPOINTMENT (OUTPATIENT)
Dept: LAB | Facility: HOSPITAL | Age: 70
End: 2025-03-28
Payer: COMMERCIAL

## 2025-03-28 ENCOUNTER — LAB (OUTPATIENT)
Dept: LAB | Facility: HOSPITAL | Age: 70
End: 2025-03-28
Payer: COMMERCIAL

## 2025-03-28 ENCOUNTER — OFFICE VISIT (OUTPATIENT)
Dept: FAMILY MEDICINE CLINIC | Facility: CLINIC | Age: 70
End: 2025-03-28
Payer: COMMERCIAL

## 2025-03-28 VITALS
SYSTOLIC BLOOD PRESSURE: 134 MMHG | WEIGHT: 210.6 LBS | OXYGEN SATURATION: 97 % | BODY MASS INDEX: 31.92 KG/M2 | HEIGHT: 68 IN | HEART RATE: 86 BPM | DIASTOLIC BLOOD PRESSURE: 86 MMHG

## 2025-03-28 DIAGNOSIS — E11.649 TYPE 2 DIABETES MELLITUS WITH HYPOGLYCEMIA WITHOUT COMA, WITH LONG-TERM CURRENT USE OF INSULIN: ICD-10-CM

## 2025-03-28 DIAGNOSIS — K59.00 CONSTIPATION, UNSPECIFIED CONSTIPATION TYPE: Primary | ICD-10-CM

## 2025-03-28 DIAGNOSIS — R06.01 ORTHOPNEA: ICD-10-CM

## 2025-03-28 DIAGNOSIS — R10.9 ABDOMINAL PAIN, UNSPECIFIED ABDOMINAL LOCATION: ICD-10-CM

## 2025-03-28 DIAGNOSIS — R93.5 ABNORMAL CT OF THE ABDOMEN: ICD-10-CM

## 2025-03-28 DIAGNOSIS — Z79.4 TYPE 2 DIABETES MELLITUS WITH HYPOGLYCEMIA WITHOUT COMA, WITH LONG-TERM CURRENT USE OF INSULIN: ICD-10-CM

## 2025-03-28 LAB
ALBUMIN SERPL-MCNC: 4 G/DL (ref 3.5–5.2)
ALBUMIN/GLOB SERPL: 1.4 G/DL
ALP SERPL-CCNC: 86 U/L (ref 39–117)
ALT SERPL W P-5'-P-CCNC: 35 U/L (ref 1–41)
ANION GAP SERPL CALCULATED.3IONS-SCNC: 8.9 MMOL/L (ref 5–15)
AST SERPL-CCNC: 32 U/L (ref 1–40)
BASOPHILS # BLD AUTO: 0.06 10*3/MM3 (ref 0–0.2)
BASOPHILS NFR BLD AUTO: 0.8 % (ref 0–1.5)
BILIRUB SERPL-MCNC: 0.5 MG/DL (ref 0–1.2)
BUN SERPL-MCNC: 30 MG/DL (ref 8–23)
BUN/CREAT SERPL: 25.9 (ref 7–25)
CALCIUM SPEC-SCNC: 9.1 MG/DL (ref 8.6–10.5)
CHLORIDE SERPL-SCNC: 105 MMOL/L (ref 98–107)
CO2 SERPL-SCNC: 24.1 MMOL/L (ref 22–29)
CREAT SERPL-MCNC: 1.16 MG/DL (ref 0.76–1.27)
DEPRECATED RDW RBC AUTO: 44.4 FL (ref 37–54)
EGFRCR SERPLBLD CKD-EPI 2021: 68.2 ML/MIN/1.73
EOSINOPHIL # BLD AUTO: 0.07 10*3/MM3 (ref 0–0.4)
EOSINOPHIL NFR BLD AUTO: 0.9 % (ref 0.3–6.2)
ERYTHROCYTE [DISTWIDTH] IN BLOOD BY AUTOMATED COUNT: 12.9 % (ref 12.3–15.4)
GLOBULIN UR ELPH-MCNC: 2.9 GM/DL
GLUCOSE SERPL-MCNC: 302 MG/DL (ref 65–99)
HCT VFR BLD AUTO: 45.8 % (ref 37.5–51)
HGB BLD-MCNC: 14.5 G/DL (ref 13–17.7)
IMM GRANULOCYTES # BLD AUTO: 0.02 10*3/MM3 (ref 0–0.05)
IMM GRANULOCYTES NFR BLD AUTO: 0.3 % (ref 0–0.5)
LIPASE SERPL-CCNC: 20 U/L (ref 13–60)
LYMPHOCYTES # BLD AUTO: 1.59 10*3/MM3 (ref 0.7–3.1)
LYMPHOCYTES NFR BLD AUTO: 21.1 % (ref 19.6–45.3)
MCH RBC QN AUTO: 29.8 PG (ref 26.6–33)
MCHC RBC AUTO-ENTMCNC: 31.7 G/DL (ref 31.5–35.7)
MCV RBC AUTO: 94 FL (ref 79–97)
MONOCYTES # BLD AUTO: 0.52 10*3/MM3 (ref 0.1–0.9)
MONOCYTES NFR BLD AUTO: 6.9 % (ref 5–12)
NEUTROPHILS NFR BLD AUTO: 5.28 10*3/MM3 (ref 1.7–7)
NEUTROPHILS NFR BLD AUTO: 70 % (ref 42.7–76)
NRBC BLD AUTO-RTO: 0 /100 WBC (ref 0–0.2)
NT-PROBNP SERPL-MCNC: 1915 PG/ML (ref 0–900)
PLATELET # BLD AUTO: 262 10*3/MM3 (ref 140–450)
PMV BLD AUTO: 11 FL (ref 6–12)
POTASSIUM SERPL-SCNC: 4.6 MMOL/L (ref 3.5–5.2)
PROT SERPL-MCNC: 6.9 G/DL (ref 6–8.5)
RBC # BLD AUTO: 4.87 10*6/MM3 (ref 4.14–5.8)
SODIUM SERPL-SCNC: 138 MMOL/L (ref 136–145)
WBC NRBC COR # BLD AUTO: 7.54 10*3/MM3 (ref 3.4–10.8)

## 2025-03-28 PROCEDURE — 99204 OFFICE O/P NEW MOD 45 MIN: CPT | Performed by: PHYSICIAN ASSISTANT

## 2025-03-28 PROCEDURE — 83880 ASSAY OF NATRIURETIC PEPTIDE: CPT

## 2025-03-28 PROCEDURE — 85025 COMPLETE CBC W/AUTO DIFF WBC: CPT

## 2025-03-28 PROCEDURE — 80053 COMPREHEN METABOLIC PANEL: CPT

## 2025-03-28 PROCEDURE — 83690 ASSAY OF LIPASE: CPT

## 2025-03-28 RX ORDER — POTASSIUM CHLORIDE 750 MG/1
10 TABLET, EXTENDED RELEASE ORAL EVERY MORNING
Qty: 7 TABLET | Refills: 0 | Status: ON HOLD | OUTPATIENT
Start: 2025-03-28

## 2025-03-28 RX ORDER — FUROSEMIDE 20 MG/1
20 TABLET ORAL DAILY
Qty: 7 TABLET | Refills: 0 | Status: ON HOLD | OUTPATIENT
Start: 2025-03-28 | End: 2025-04-04

## 2025-03-28 RX ORDER — GLIMEPIRIDE 4 MG/1
4 TABLET ORAL 2 TIMES DAILY WITH MEALS
Status: ON HOLD | COMMUNITY

## 2025-03-28 RX ORDER — DAPAGLIFLOZIN 10 MG/1
10 TABLET, FILM COATED ORAL DAILY
Status: ON HOLD | COMMUNITY

## 2025-03-28 RX ORDER — INSULIN GLARGINE 300 U/ML
20 INJECTION, SOLUTION SUBCUTANEOUS DAILY
Status: ON HOLD | COMMUNITY

## 2025-03-28 RX ORDER — OMEPRAZOLE 40 MG/1
40 CAPSULE, DELAYED RELEASE ORAL DAILY
COMMUNITY
End: 2025-03-28

## 2025-03-28 RX ORDER — ROSUVASTATIN CALCIUM 20 MG/1
20 TABLET, COATED ORAL DAILY
Status: ON HOLD | COMMUNITY

## 2025-03-28 NOTE — PATIENT INSTRUCTIONS
Notify our office or your cardiologist if you gain 4 or more pounds in 24-48 hours. Weight yourself at home daily.   Start miralax 1 capful daily and metamucil once dose daily.    Pt accepted March 3rd appt.  Pt also put on wait list.

## 2025-03-28 NOTE — PROGRESS NOTES
Chief Complaint   Patient presents with    Abdominal Pain     Stomach pain, issues (nausea, SOB, stomach pain) (sick since January 2nd)   Appointment with Gastroenterology August but cannot wait until then          HPI     Scott Benz is a pleasant 69 y.o. male with a history of CHF, hypertension, PVD s/p right above the knee amputation, type 2 diabetes, and peripheral neuropathy who presents for an initial visit.     Accompanied by his wife today. Previous PCP was Ottoniel Joseph MD in [place] for many years. He lives in [facility]. He presents to establish care.     His primary concern today is nausea starting in January. Started on zofran but was still having problems then started on omeprazole 40 mg in February. He stopped medications after 2 weeks due to no significant improvement. He reports abdominal pain starting in the RLQ that is now in the RUQ in the past 2-3 weeks. This is intermittent and feels like a muscle spasm, dull pain. He thinks it is getting worse, pain 4/10 currently. He was referred to GI but he does not have an appointment until August. He states rubbing castor oil on his stomach has helped with pain but not nausea. He admits to hard stools and constipation. He reports a BM once weekly. Last BM was 2-3 days ago. He has gone a month without a BM in the past. Has tried to increase green beans and fiber in his diet. He has been using oral laxatives OTC which offer temporary improvement.      He also notes orthopnea for the past 3 weeks. He does not notice weight gain or increased lower extremity swelling. No PND or chest pain. A chest x-ray in February showed mild fluid overload. He recently established care with  cardiology in [facility] earlier this week. He was referred for Lexiscan and echocardiogram. He is not currently taking diuretics.     He takes novolin R 5 units when he sees his glucose higher than 200 but not necessarily at mealtimes. His last A1C was 6.5 in February.  Diabetic for over 20 years. He has had problems and admissions for hypoglycemia, most recently in February. He is taking glimepride twice daily, morning and evening. He takes toujeo in the morning. Metformin morning and afternoon with meals.     Past Medical History:   Diagnosis Date    Arthritis     with hands    Diabetes     H/O lower limb amputation     Stroke 11/10/2019       Past Surgical History:   Procedure Laterality Date    ABOVE KNEE LEG AMPUTATION      AMPUTATION DIGIT Left 07/23/2019    Procedure: excision left 4th metatarsal;  Surgeon: Shannan Kraft MD;  Location: [facility] OR;  Service: Orthopedics    APPENDECTOMY      INCISION AND DRAINAGE LEG Left 07/23/2019    Procedure: I&D left foot wound;  Surgeon: Shannan Kraft MD;  Location:  [facility] OR;  Service: Orthopedics       Family History   Problem Relation Age of Onset    Stroke Father     Diabetes Father        Social History     Socioeconomic History    Marital status:    Tobacco Use    Smoking status: Never    Smokeless tobacco: Never   Vaping Use    Vaping status: Never Used   Substance and Sexual Activity    Alcohol use: No    Drug use: No    Sexual activity: Defer       No Known Allergies    ROS    Review of Systems   Constitutional:  Negative for chills and fever.   Respiratory:  Positive for shortness of breath. Negative for cough.    Cardiovascular:  Negative for chest pain.   Gastrointestinal:  Positive for abdominal pain, constipation and nausea. Negative for blood in stool, diarrhea, vomiting and GERD.   Genitourinary:  Negative for difficulty urinating, dysuria and hematuria.       Vitals:    03/28/25 0851   BP: 134/86   Pulse: 86   SpO2: 97%     Body mass index is 32.02 kg/m².      Current Outpatient Medications:     aspirin 325 MG tablet, Take 162 mg by mouth Daily., Disp: , Rfl:     dapagliflozin Propanediol (Farxiga) 10 MG tablet, Take  by mouth., Disp: , Rfl:     gabapentin (NEURONTIN) 600 MG tablet, , Disp: , Rfl:      glimepiride (AMARYL) 4 MG tablet, Take 1 tablet by mouth 2 (Two) Times a Day With Meals., Disp: , Rfl:     Insulin Glargine, 2 Unit Dial, (Toujeo Max SoloStar) 300 UNIT/ML solution pen-injector injection, Inject 22 Units under the skin into the appropriate area as directed Daily., Disp: , Rfl:     metFORMIN (GLUCOPHAGE) 1000 MG tablet, Take 1 tablet by mouth 2 (Two) Times a Day With Meals., Disp: , Rfl:     metoprolol tartrate (LOPRESSOR) 25 MG tablet, Take 1 tablet by mouth Daily., Disp: , Rfl:     NOVOLIN R 100 UNIT/ML injection, , Disp: , Rfl:     rosuvastatin (CRESTOR) 20 MG tablet, Take 1 tablet by mouth Daily., Disp: , Rfl:     furosemide (Lasix) 20 MG tablet, Take 1 tablet by mouth Daily for 7 days., Disp: 7 tablet, Rfl: 0    potassium chloride (KLOR-CON M10) 10 MEQ CR tablet, Take 1 tablet by mouth Every Morning. Take with furosemide., Disp: 7 tablet, Rfl: 0    PE    Physical Exam  Vitals reviewed.   Constitutional:       General: He is not in acute distress.     Appearance: He is well-developed. He is obese.      Comments: Right above knee amputation.    HENT:      Head: Normocephalic and atraumatic.   Eyes:      Conjunctiva/sclera: Conjunctivae normal.   Cardiovascular:      Rate and Rhythm: Normal rate and regular rhythm.      Heart sounds: Normal heart sounds. No murmur heard.  Pulmonary:      Effort: Pulmonary effort is normal.      Breath sounds: Normal breath sounds. No wheezing, rhonchi or rales.   Abdominal:      Tenderness: There is abdominal tenderness in the right upper quadrant and right lower quadrant. There is no guarding or rebound.      Comments: Right-sided abdominal TTP   Musculoskeletal:      Left lower le+ Edema present.   Skin:     General: Skin is warm and dry.   Neurological:      Mental Status: He is alert.   Psychiatric:         Speech: Speech normal.         Behavior: Behavior normal.          A/P    Problem List Items Addressed This Visit    None  Visit Diagnoses          Constipation, unspecified constipation type    -  Primary      Abdominal pain, unspecified abdominal location        Relevant Orders    CBC & Differential (Completed)    Comprehensive Metabolic Panel (Completed)    Lipase (Completed)    US Abdomen Limited      Orthopnea        Relevant Orders    proBNP (Completed)      Type 2 diabetes mellitus with hypoglycemia without coma, with long-term current use of insulin        Relevant Medications    metFORMIN (GLUCOPHAGE) 1000 MG tablet    dapagliflozin Propanediol (Farxiga) 10 MG tablet    glimepiride (AMARYL) 4 MG tablet    Insulin Glargine, 2 Unit Dial, (Toujeo Max SoloStar) 300 UNIT/ML solution pen-injector injection      Abnormal CT of the abdomen        Relevant Orders    US Abdomen Limited          -Suspect constipation may be contributing to nausea and abdominal pain. Will start metamucil and miralax daily to see if this improves his symptoms.   -I reviewed his CT abdomen/pelvis 1/2/25 remarkable for borderline cardiomegaly, coronary artery disease, small bilateral pleural effusion with adjacent atelectasis, 3 mm left distal ureteral stone with mild left hydroureteronephrosis, left nephrolithiasis with 2 cm stone in the midpole, bilateral perinephric inflammatory fat-stranding which can be seen in CKD, mild prostatomegaly measuring 5 cm in transverse diameter, central prostatic calcifications, diverticulosis without acute diverticulitis, left lower anterior abdominal wall focal skin thickening, soft tissue density adjacent to the right common femoral artery which may represent scarring or pseudoaneurysm or other etiology. Consider nonemergent right lower quadrant ultrasound for further evaluation.    -Will also plan on ultrasound follow-up for the soft tissue density adjacent to the right common femoral artery given his pain is on the right.   -Recommended patient dose glimepiride in the morning or afternoon to reduce risk of nocturnal hypoglycemia. He was also  encouraged to use his regular insulin only with meals. Continue metformin, farxiga, and toujeo at this time. May need to relax his regimen or discontinue sulfonylurea if hypoglycemia persists.   -Add lasix 20 mg qd for 1 week given given symptoms of fluid overload. He again denies chest pain. Counseled patient to weight himself daily and report 4 or more pound weight gain over 24-48 hour period to our office or his cardiologist.   -RTC in 3 weeks for follow-up of abdominal pain, constipation.        Addendum: Reviewed recommendation for RLQ ultrasound to eval soft tissue density with the patient over the phone and he is agreeable to this.     Plan of care was reviewed with patient at the conclusion of today's visit. Education was provided regarding diagnoses, management, prescribed or recommended OTC products, and the importance of compliance with follow-up appointments. The patient was counseled regarding the risks, benefits, and possible side-effects of treatment. I advised the patient to keep me informed of any acute changes in their status including new, worsening, or persistent symptoms. Patient expresses understanding and agreement with the management plan.        Josh Green PA-C

## 2025-03-29 ENCOUNTER — APPOINTMENT (OUTPATIENT)
Dept: GENERAL RADIOLOGY | Facility: HOSPITAL | Age: 70
End: 2025-03-29
Payer: MEDICARE

## 2025-03-29 ENCOUNTER — HOSPITAL ENCOUNTER (INPATIENT)
Facility: HOSPITAL | Age: 70
LOS: 3 days | Discharge: NURSING, REHAB OR LONG-TERM CARE | End: 2025-04-03
Attending: INTERNAL MEDICINE | Admitting: INTERNAL MEDICINE
Payer: MEDICARE

## 2025-03-29 PROBLEM — N18.9 CKD (CHRONIC KIDNEY DISEASE): Status: ACTIVE | Noted: 2025-03-29

## 2025-03-29 PROBLEM — E11.319 DIABETIC RETINOPATHY: Chronic | Status: ACTIVE | Noted: 2025-03-29

## 2025-03-29 PROBLEM — N18.2 STAGE 2 CHRONIC KIDNEY DISEASE: Status: ACTIVE | Noted: 2025-03-29

## 2025-03-29 PROBLEM — R06.02 SHORTNESS OF BREATH: Status: RESOLVED | Noted: 2025-03-29 | Resolved: 2025-03-29

## 2025-03-29 PROBLEM — I10 ACCELERATED HYPERTENSION: Status: RESOLVED | Noted: 2025-03-29 | Resolved: 2025-03-29

## 2025-03-29 PROBLEM — E16.2 HYPOGLYCEMIA: Status: ACTIVE | Noted: 2025-02-04

## 2025-03-29 PROBLEM — Z98.890 S/P DEBRIDEMENT: Status: RESOLVED | Noted: 2019-07-23 | Resolved: 2025-03-29

## 2025-03-29 PROBLEM — E11.9 DIABETES MELLITUS: Status: ACTIVE | Noted: 2024-12-18

## 2025-03-29 PROBLEM — E78.5 HYPERLIPIDEMIA: Status: ACTIVE | Noted: 2024-12-04

## 2025-03-29 PROBLEM — I10 ACCELERATED HYPERTENSION: Status: ACTIVE | Noted: 2025-03-29

## 2025-03-29 PROBLEM — Z74.09 IMPAIRED FUNCTIONAL MOBILITY, BALANCE, GAIT, AND ENDURANCE: Status: RESOLVED | Noted: 2019-07-19 | Resolved: 2025-03-29

## 2025-03-29 PROBLEM — D62 ACUTE BLOOD LOSS ANEMIA: Status: RESOLVED | Noted: 2019-07-25 | Resolved: 2025-03-29

## 2025-03-29 PROBLEM — E11.65 UNCONTROLLED TYPE 2 DIABETES MELLITUS WITH HYPERGLYCEMIA: Status: RESOLVED | Noted: 2019-07-19 | Resolved: 2025-03-29

## 2025-03-29 PROBLEM — R30.0 DYSURIA: Status: ACTIVE | Noted: 2025-03-29

## 2025-03-29 PROBLEM — E11.21 DIABETIC NEPHROPATHY: Chronic | Status: RESOLVED | Noted: 2025-03-29 | Resolved: 2025-03-29

## 2025-03-29 PROBLEM — Z86.73 HISTORY OF CVA (CEREBROVASCULAR ACCIDENT): Status: ACTIVE | Noted: 2025-03-29

## 2025-03-29 PROBLEM — M17.9 OSTEOARTHRITIS OF KNEE: Status: ACTIVE | Noted: 2019-02-28

## 2025-03-29 PROBLEM — M19.90 ARTHRITIS: Status: ACTIVE | Noted: 2025-03-29

## 2025-03-29 PROBLEM — R09.89 DECREASED PULSES IN FEET: Status: RESOLVED | Noted: 2019-07-19 | Resolved: 2025-03-29

## 2025-03-29 PROBLEM — M86.10 OSTEOMYELITIS, ACUTE: Status: RESOLVED | Noted: 2019-07-19 | Resolved: 2025-03-29

## 2025-03-29 PROBLEM — J18.9 PNEUMONIA OF BOTH LOWER LOBES DUE TO INFECTIOUS ORGANISM: Status: ACTIVE | Noted: 2025-03-29

## 2025-03-29 PROBLEM — R10.31 RLQ ABDOMINAL PAIN: Status: ACTIVE | Noted: 2025-03-29

## 2025-03-29 PROBLEM — G89.18 ACUTE POSTOPERATIVE PAIN: Status: RESOLVED | Noted: 2019-07-23 | Resolved: 2025-03-29

## 2025-03-29 PROBLEM — I50.9 CONGESTIVE HEART FAILURE: Chronic | Status: ACTIVE | Noted: 2024-12-04

## 2025-03-29 PROBLEM — R06.09 DYSPNEA ON EXERTION: Status: ACTIVE | Noted: 2024-12-04

## 2025-03-29 PROBLEM — R06.01 ORTHOPNEA: Status: ACTIVE | Noted: 2025-03-29

## 2025-03-29 PROBLEM — S78.111A UNILATERAL AKA, RIGHT: Chronic | Status: ACTIVE | Noted: 2025-02-04

## 2025-03-29 PROBLEM — E11.621 DIABETIC ULCER OF TOE OF LEFT FOOT ASSOCIATED WITH TYPE 2 DIABETES MELLITUS, LIMITED TO BREAKDOWN OF SKIN: Status: RESOLVED | Noted: 2019-12-04 | Resolved: 2025-03-29

## 2025-03-29 PROBLEM — M86.372 CHRONIC MULTIFOCAL OSTEOMYELITIS OF LEFT FOOT: Status: RESOLVED | Noted: 2019-07-19 | Resolved: 2025-03-29

## 2025-03-29 PROBLEM — R06.02 SHORTNESS OF BREATH: Status: ACTIVE | Noted: 2025-03-29

## 2025-03-29 PROBLEM — I73.9 PAD (PERIPHERAL ARTERY DISEASE): Chronic | Status: ACTIVE | Noted: 2024-12-04

## 2025-03-29 PROBLEM — E11.21 DIABETIC NEPHROPATHY: Chronic | Status: ACTIVE | Noted: 2025-03-29

## 2025-03-29 PROBLEM — I10 ESSENTIAL HYPERTENSION: Status: ACTIVE | Noted: 2024-12-04

## 2025-03-29 PROBLEM — I63.9 CVA (CEREBRAL VASCULAR ACCIDENT): Chronic | Status: ACTIVE | Noted: 2025-03-29

## 2025-03-29 PROBLEM — K59.04 CHRONIC IDIOPATHIC CONSTIPATION: Status: ACTIVE | Noted: 2025-03-29

## 2025-03-29 PROBLEM — Z79.4 TYPE 2 DIABETES MELLITUS WITH HYPERGLYCEMIA, WITH LONG-TERM CURRENT USE OF INSULIN: Status: ACTIVE | Noted: 2024-12-18

## 2025-03-29 PROBLEM — E11.65 TYPE 2 DIABETES MELLITUS WITH HYPERGLYCEMIA, WITH LONG-TERM CURRENT USE OF INSULIN: Status: ACTIVE | Noted: 2024-12-18

## 2025-03-29 PROBLEM — L97.521 DIABETIC ULCER OF TOE OF LEFT FOOT ASSOCIATED WITH TYPE 2 DIABETES MELLITUS, LIMITED TO BREAKDOWN OF SKIN: Status: RESOLVED | Noted: 2019-12-04 | Resolved: 2025-03-29

## 2025-03-29 PROBLEM — I50.9 CHF EXACERBATION: Status: ACTIVE | Noted: 2024-12-04

## 2025-03-29 PROBLEM — M86.9 OSTEOMYELITIS OF LEFT FOOT: Status: RESOLVED | Noted: 2019-07-19 | Resolved: 2025-03-29

## 2025-03-29 LAB
BACTERIA UR QL AUTO: NORMAL /HPF
BILIRUB UR QL STRIP: NEGATIVE
CLARITY UR: CLEAR
COLOR UR: YELLOW
GLUCOSE BLDC GLUCOMTR-MCNC: 134 MG/DL (ref 70–130)
GLUCOSE UR STRIP-MCNC: NEGATIVE MG/DL
HGB UR QL STRIP.AUTO: ABNORMAL
HYALINE CASTS UR QL AUTO: NORMAL /LPF
KETONES UR QL STRIP: NEGATIVE
LEUKOCYTE ESTERASE UR QL STRIP.AUTO: ABNORMAL
NITRITE UR QL STRIP: NEGATIVE
PH UR STRIP.AUTO: 7 [PH] (ref 5–8)
PROT UR QL STRIP: NEGATIVE
RBC # UR STRIP: NORMAL /HPF
REF LAB TEST METHOD: NORMAL
SP GR UR STRIP: 1.01 (ref 1–1.03)
SQUAMOUS #/AREA URNS HPF: NORMAL /HPF
UROBILINOGEN UR QL STRIP: ABNORMAL
WBC # UR STRIP: NORMAL /HPF

## 2025-03-29 PROCEDURE — 93010 ELECTROCARDIOGRAM REPORT: CPT | Performed by: INTERNAL MEDICINE

## 2025-03-29 PROCEDURE — 99223 1ST HOSP IP/OBS HIGH 75: CPT | Performed by: NURSE PRACTITIONER

## 2025-03-29 PROCEDURE — 25010000002 HEPARIN (PORCINE) PER 1000 UNITS: Performed by: NURSE PRACTITIONER

## 2025-03-29 PROCEDURE — 82948 REAGENT STRIP/BLOOD GLUCOSE: CPT

## 2025-03-29 PROCEDURE — 0202U NFCT DS 22 TRGT SARS-COV-2: CPT | Performed by: NURSE PRACTITIONER

## 2025-03-29 PROCEDURE — 74018 RADEX ABDOMEN 1 VIEW: CPT

## 2025-03-29 PROCEDURE — G0378 HOSPITAL OBSERVATION PER HR: HCPCS

## 2025-03-29 PROCEDURE — 93005 ELECTROCARDIOGRAM TRACING: CPT | Performed by: NURSE PRACTITIONER

## 2025-03-29 PROCEDURE — 71045 X-RAY EXAM CHEST 1 VIEW: CPT

## 2025-03-29 PROCEDURE — 81001 URINALYSIS AUTO W/SCOPE: CPT | Performed by: NURSE PRACTITIONER

## 2025-03-29 PROCEDURE — 25010000002 FUROSEMIDE PER 20 MG: Performed by: NURSE PRACTITIONER

## 2025-03-29 PROCEDURE — 87449 NOS EACH ORGANISM AG IA: CPT | Performed by: NURSE PRACTITIONER

## 2025-03-29 PROCEDURE — 87641 MR-STAPH DNA AMP PROBE: CPT | Performed by: NURSE PRACTITIONER

## 2025-03-29 RX ORDER — PROMETHAZINE HYDROCHLORIDE 12.5 MG/1
12.5 TABLET ORAL EVERY 6 HOURS PRN
Status: DISCONTINUED | OUTPATIENT
Start: 2025-03-29 | End: 2025-04-03 | Stop reason: HOSPADM

## 2025-03-29 RX ORDER — ONDANSETRON 4 MG/1
4 TABLET, FILM COATED ORAL EVERY 8 HOURS PRN
Status: ON HOLD | COMMUNITY
End: 2025-03-31

## 2025-03-29 RX ORDER — POLYETHYLENE GLYCOL 3350 17 G/17G
17 POWDER, FOR SOLUTION ORAL DAILY PRN
Status: DISCONTINUED | OUTPATIENT
Start: 2025-03-29 | End: 2025-04-03 | Stop reason: HOSPADM

## 2025-03-29 RX ORDER — SODIUM CHLORIDE 0.9 % (FLUSH) 0.9 %
10 SYRINGE (ML) INJECTION AS NEEDED
Status: DISCONTINUED | OUTPATIENT
Start: 2025-03-29 | End: 2025-04-03 | Stop reason: HOSPADM

## 2025-03-29 RX ORDER — OMEPRAZOLE 40 MG/1
40 CAPSULE, DELAYED RELEASE ORAL DAILY
Status: ON HOLD | COMMUNITY
End: 2025-03-31

## 2025-03-29 RX ORDER — ACETAMINOPHEN 650 MG/1
650 SUPPOSITORY RECTAL EVERY 4 HOURS PRN
Status: DISCONTINUED | OUTPATIENT
Start: 2025-03-29 | End: 2025-04-03 | Stop reason: HOSPADM

## 2025-03-29 RX ORDER — PROMETHAZINE HYDROCHLORIDE 12.5 MG/1
12.5 SUPPOSITORY RECTAL EVERY 6 HOURS PRN
Status: DISCONTINUED | OUTPATIENT
Start: 2025-03-29 | End: 2025-04-03 | Stop reason: HOSPADM

## 2025-03-29 RX ORDER — DEXTROSE MONOHYDRATE 25 G/50ML
25 INJECTION, SOLUTION INTRAVENOUS
Status: DISCONTINUED | OUTPATIENT
Start: 2025-03-29 | End: 2025-04-03 | Stop reason: HOSPADM

## 2025-03-29 RX ORDER — HEPARIN SODIUM 5000 [USP'U]/ML
5000 INJECTION, SOLUTION INTRAVENOUS; SUBCUTANEOUS EVERY 8 HOURS SCHEDULED
Status: DISCONTINUED | OUTPATIENT
Start: 2025-03-29 | End: 2025-04-03 | Stop reason: HOSPADM

## 2025-03-29 RX ORDER — BISACODYL 5 MG/1
5 TABLET, DELAYED RELEASE ORAL DAILY PRN
Status: DISCONTINUED | OUTPATIENT
Start: 2025-03-29 | End: 2025-04-03 | Stop reason: HOSPADM

## 2025-03-29 RX ORDER — ACETAMINOPHEN 325 MG/1
650 TABLET ORAL EVERY 4 HOURS PRN
Status: DISCONTINUED | OUTPATIENT
Start: 2025-03-29 | End: 2025-04-03 | Stop reason: HOSPADM

## 2025-03-29 RX ORDER — INSULIN LISPRO 100 [IU]/ML
2-7 INJECTION, SOLUTION INTRAVENOUS; SUBCUTANEOUS
Status: DISCONTINUED | OUTPATIENT
Start: 2025-03-29 | End: 2025-04-03 | Stop reason: HOSPADM

## 2025-03-29 RX ORDER — ROSUVASTATIN CALCIUM 20 MG/1
20 TABLET, COATED ORAL DAILY
Status: DISCONTINUED | OUTPATIENT
Start: 2025-03-30 | End: 2025-04-03 | Stop reason: HOSPADM

## 2025-03-29 RX ORDER — GUAIFENESIN 600 MG/1
600 TABLET, EXTENDED RELEASE ORAL EVERY 12 HOURS SCHEDULED
Status: DISCONTINUED | OUTPATIENT
Start: 2025-03-29 | End: 2025-04-03 | Stop reason: HOSPADM

## 2025-03-29 RX ORDER — PANTOPRAZOLE SODIUM 40 MG/1
40 TABLET, DELAYED RELEASE ORAL
Status: DISCONTINUED | OUTPATIENT
Start: 2025-03-30 | End: 2025-04-03 | Stop reason: HOSPADM

## 2025-03-29 RX ORDER — ACETAMINOPHEN 160 MG/5ML
650 SOLUTION ORAL EVERY 4 HOURS PRN
Status: DISCONTINUED | OUTPATIENT
Start: 2025-03-29 | End: 2025-04-03 | Stop reason: HOSPADM

## 2025-03-29 RX ORDER — BISACODYL 10 MG
10 SUPPOSITORY, RECTAL RECTAL DAILY PRN
Status: DISCONTINUED | OUTPATIENT
Start: 2025-03-29 | End: 2025-04-03 | Stop reason: HOSPADM

## 2025-03-29 RX ORDER — POLYETHYLENE GLYCOL 3350 17 G/17G
17 POWDER, FOR SOLUTION ORAL DAILY
Status: DISCONTINUED | OUTPATIENT
Start: 2025-03-30 | End: 2025-04-03 | Stop reason: HOSPADM

## 2025-03-29 RX ORDER — GABAPENTIN 300 MG/1
600 CAPSULE ORAL EVERY 8 HOURS SCHEDULED
Status: DISCONTINUED | OUTPATIENT
Start: 2025-03-29 | End: 2025-04-03 | Stop reason: HOSPADM

## 2025-03-29 RX ORDER — SODIUM CHLORIDE 9 MG/ML
40 INJECTION, SOLUTION INTRAVENOUS AS NEEDED
Status: DISCONTINUED | OUTPATIENT
Start: 2025-03-29 | End: 2025-04-03 | Stop reason: HOSPADM

## 2025-03-29 RX ORDER — NITROGLYCERIN 0.4 MG/1
0.4 TABLET SUBLINGUAL
Status: DISCONTINUED | OUTPATIENT
Start: 2025-03-29 | End: 2025-04-03 | Stop reason: HOSPADM

## 2025-03-29 RX ORDER — METOPROLOL TARTRATE 25 MG/1
25 TABLET, FILM COATED ORAL 2 TIMES DAILY
Status: DISCONTINUED | OUTPATIENT
Start: 2025-03-29 | End: 2025-04-02

## 2025-03-29 RX ORDER — SODIUM CHLORIDE 0.9 % (FLUSH) 0.9 %
10 SYRINGE (ML) INJECTION EVERY 12 HOURS SCHEDULED
Status: DISCONTINUED | OUTPATIENT
Start: 2025-03-29 | End: 2025-04-03 | Stop reason: HOSPADM

## 2025-03-29 RX ORDER — NICOTINE POLACRILEX 4 MG
15 LOZENGE BUCCAL
Status: DISCONTINUED | OUTPATIENT
Start: 2025-03-29 | End: 2025-04-03 | Stop reason: HOSPADM

## 2025-03-29 RX ORDER — ALBUTEROL SULFATE 0.83 MG/ML
2.5 SOLUTION RESPIRATORY (INHALATION) EVERY 4 HOURS PRN
Status: DISCONTINUED | OUTPATIENT
Start: 2025-03-29 | End: 2025-04-03 | Stop reason: HOSPADM

## 2025-03-29 RX ORDER — AMOXICILLIN 250 MG
2 CAPSULE ORAL 2 TIMES DAILY
Status: DISCONTINUED | OUTPATIENT
Start: 2025-03-29 | End: 2025-04-03 | Stop reason: HOSPADM

## 2025-03-29 RX ORDER — IPRATROPIUM BROMIDE AND ALBUTEROL SULFATE 2.5; .5 MG/3ML; MG/3ML
3 SOLUTION RESPIRATORY (INHALATION)
Status: DISCONTINUED | OUTPATIENT
Start: 2025-03-29 | End: 2025-03-31

## 2025-03-29 RX ORDER — FUROSEMIDE 10 MG/ML
40 INJECTION INTRAMUSCULAR; INTRAVENOUS 2 TIMES DAILY
Status: DISCONTINUED | OUTPATIENT
Start: 2025-03-29 | End: 2025-03-31

## 2025-03-29 RX ORDER — ASPIRIN 325 MG
162 TABLET ORAL DAILY
Status: DISCONTINUED | OUTPATIENT
Start: 2025-03-30 | End: 2025-04-03 | Stop reason: HOSPADM

## 2025-03-29 RX ORDER — IBUPROFEN 600 MG/1
1 TABLET ORAL
Status: DISCONTINUED | OUTPATIENT
Start: 2025-03-29 | End: 2025-04-03 | Stop reason: HOSPADM

## 2025-03-29 RX ADMIN — METOPROLOL TARTRATE 25 MG: 25 TABLET, FILM COATED ORAL at 22:30

## 2025-03-29 RX ADMIN — Medication 10 ML: at 22:31

## 2025-03-29 RX ADMIN — GABAPENTIN 600 MG: 300 CAPSULE ORAL at 22:30

## 2025-03-29 RX ADMIN — FUROSEMIDE 40 MG: 10 INJECTION, SOLUTION INTRAMUSCULAR; INTRAVENOUS at 22:30

## 2025-03-29 RX ADMIN — HEPARIN SODIUM 5000 UNITS: 5000 INJECTION INTRAVENOUS; SUBCUTANEOUS at 22:31

## 2025-03-29 RX ADMIN — SENNOSIDES AND DOCUSATE SODIUM 2 TABLET: 50; 8.6 TABLET ORAL at 22:30

## 2025-03-29 NOTE — Clinical Note
First balloon inflation max pressure = 12 tae. First balloon inflation duration = 15 seconds. Second inflation of balloon - Max pressure = 12 tae. 2nd Inflation of balloon - Duration = 15 seconds.

## 2025-03-29 NOTE — Clinical Note
First balloon inflation max pressure = 8 tae. First balloon inflation duration = 30 seconds. Second inflation of balloon - Max pressure = 8 tae. 2nd Inflation of balloon - Duration = 30 seconds. Third inflation of balloon - Max pressure = 15 tae. 3rd Inflation of balloon - Duration = 30 seconds.

## 2025-03-29 NOTE — Clinical Note
First balloon inflation max pressure = 12 tae. First balloon inflation duration = 15 seconds. Second inflation of balloon - Max pressure = 15 tae. 2nd Inflation of balloon - Duration = 15 seconds.

## 2025-03-29 NOTE — Clinical Note
First balloon inflation max pressure = 12 tae. First balloon inflation duration = 12 seconds. Second inflation of balloon - Max pressure = 12 tae. 2nd Inflation of balloon - Duration = 10 seconds. Third inflation of balloon - Max pressure = 12 tae. 3rd Inflation of balloon - Duration = 12 seconds.

## 2025-03-29 NOTE — Clinical Note
First balloon inflation max pressure = 12 tae. First balloon inflation duration = 15 seconds. Second inflation of balloon - Max pressure = 12 tae. 2nd Inflation of balloon - Duration = 15 seconds. Third inflation of balloon - Max pressure = 12 tae. 3rd Inflation of balloon - Duration = 15 seconds.

## 2025-03-30 ENCOUNTER — APPOINTMENT (OUTPATIENT)
Dept: CARDIOLOGY | Facility: HOSPITAL | Age: 70
End: 2025-03-30
Payer: MEDICARE

## 2025-03-30 LAB
ALBUMIN SERPL-MCNC: 4 G/DL (ref 3.5–5.2)
ALBUMIN/GLOB SERPL: 1.3 G/DL
ALP SERPL-CCNC: 80 U/L (ref 39–117)
ALT SERPL W P-5'-P-CCNC: 23 U/L (ref 1–41)
ANION GAP SERPL CALCULATED.3IONS-SCNC: 14 MMOL/L (ref 5–15)
ANION GAP SERPL CALCULATED.3IONS-SCNC: 15 MMOL/L (ref 5–15)
AORTIC DIMENSIONLESS INDEX: 0.42 (DI)
AST SERPL-CCNC: 19 U/L (ref 1–40)
AV MEAN PRESS GRAD SYS DOP V1V2: 9.5 MMHG
AV VMAX SYS DOP: 210 CM/SEC
B PARAPERT DNA SPEC QL NAA+PROBE: NOT DETECTED
B PERT DNA SPEC QL NAA+PROBE: NOT DETECTED
BASOPHILS # BLD AUTO: 0.05 10*3/MM3 (ref 0–0.2)
BASOPHILS # BLD AUTO: 0.06 10*3/MM3 (ref 0–0.2)
BASOPHILS NFR BLD AUTO: 0.8 % (ref 0–1.5)
BASOPHILS NFR BLD AUTO: 0.9 % (ref 0–1.5)
BH CV ECHO MEAS - AO MAX PG: 17.7 MMHG
BH CV ECHO MEAS - AO V2 VTI: 42.2 CM
BH CV ECHO MEAS - AVA(I,D): 1.32 CM2
BH CV ECHO MEAS - EDV(CUBED): 103.8 ML
BH CV ECHO MEAS - EDV(MOD-SP2): 61 ML
BH CV ECHO MEAS - EDV(MOD-SP4): 76.5 ML
BH CV ECHO MEAS - EF(MOD-SP2): 31.5 %
BH CV ECHO MEAS - EF(MOD-SP4): 45.9 %
BH CV ECHO MEAS - ESV(CUBED): 59.3 ML
BH CV ECHO MEAS - ESV(MOD-SP2): 41.8 ML
BH CV ECHO MEAS - ESV(MOD-SP4): 41.4 ML
BH CV ECHO MEAS - FS: 17 %
BH CV ECHO MEAS - IVS/LVPW: 0.86 CM
BH CV ECHO MEAS - IVSD: 1.2 CM
BH CV ECHO MEAS - LA DIMENSION: 4.1 CM
BH CV ECHO MEAS - LAT PEAK E' VEL: 5.4 CM/SEC
BH CV ECHO MEAS - LV DIASTOLIC VOL/BSA (35-75): 36.7 CM2
BH CV ECHO MEAS - LV MASS(C)D: 237.9 GRAMS
BH CV ECHO MEAS - LV MAX PG: 2.8 MMHG
BH CV ECHO MEAS - LV MEAN PG: 1.5 MMHG
BH CV ECHO MEAS - LV SYSTOLIC VOL/BSA (12-30): 19.9 CM2
BH CV ECHO MEAS - LV V1 MAX: 83.4 CM/SEC
BH CV ECHO MEAS - LV V1 VTI: 17.7 CM
BH CV ECHO MEAS - LVIDD: 4.7 CM
BH CV ECHO MEAS - LVIDS: 3.9 CM
BH CV ECHO MEAS - LVOT AREA: 3.1 CM2
BH CV ECHO MEAS - LVOT DIAM: 2 CM
BH CV ECHO MEAS - LVPWD: 1.4 CM
BH CV ECHO MEAS - MED PEAK E' VEL: 5.9 CM/SEC
BH CV ECHO MEAS - MV A MAX VEL: 38.3 CM/SEC
BH CV ECHO MEAS - MV DEC SLOPE: 360 CM/SEC2
BH CV ECHO MEAS - MV DEC TIME: 0.19 SEC
BH CV ECHO MEAS - MV E MAX VEL: 83.8 CM/SEC
BH CV ECHO MEAS - MV E/A: 2.19
BH CV ECHO MEAS - MV MAX PG: 3.9 MMHG
BH CV ECHO MEAS - MV MEAN PG: 1 MMHG
BH CV ECHO MEAS - MV P1/2T: 78.3 MSEC
BH CV ECHO MEAS - MV V2 VTI: 29.7 CM
BH CV ECHO MEAS - MVA(P1/2T): 2.8 CM2
BH CV ECHO MEAS - MVA(VTI): 1.87 CM2
BH CV ECHO MEAS - PA ACC TIME: 0.1 SEC
BH CV ECHO MEAS - SV(LVOT): 55.6 ML
BH CV ECHO MEAS - SV(MOD-SP2): 19.2 ML
BH CV ECHO MEAS - SV(MOD-SP4): 35.1 ML
BH CV ECHO MEAS - SVI(LVOT): 26.7 ML/M2
BH CV ECHO MEAS - SVI(MOD-SP2): 9.2 ML/M2
BH CV ECHO MEAS - SVI(MOD-SP4): 16.8 ML/M2
BH CV ECHO MEAS - TAPSE (>1.6): 1.56 CM
BH CV ECHO MEASUREMENTS AVERAGE E/E' RATIO: 14.83
BH CV VAS BP RIGHT ARM: NORMAL MMHG
BH CV XLRA - RV BASE: 2.8 CM
BH CV XLRA - RV LENGTH: 6.3 CM
BH CV XLRA - RV MID: 2.4 CM
BH CV XLRA - TDI S': 10.7 CM/SEC
BILIRUB SERPL-MCNC: 0.8 MG/DL (ref 0–1.2)
BUN SERPL-MCNC: 26 MG/DL (ref 8–23)
BUN SERPL-MCNC: 27 MG/DL (ref 8–23)
BUN/CREAT SERPL: 24.8 (ref 7–25)
BUN/CREAT SERPL: 27.4 (ref 7–25)
C PNEUM DNA NPH QL NAA+NON-PROBE: NOT DETECTED
CALCIUM SPEC-SCNC: 8.8 MG/DL (ref 8.6–10.5)
CALCIUM SPEC-SCNC: 9 MG/DL (ref 8.6–10.5)
CHLORIDE SERPL-SCNC: 101 MMOL/L (ref 98–107)
CHLORIDE SERPL-SCNC: 103 MMOL/L (ref 98–107)
CHOLEST SERPL-MCNC: 167 MG/DL (ref 0–200)
CO2 SERPL-SCNC: 25 MMOL/L (ref 22–29)
CO2 SERPL-SCNC: 26 MMOL/L (ref 22–29)
CREAT SERPL-MCNC: 0.95 MG/DL (ref 0.76–1.27)
CREAT SERPL-MCNC: 1.09 MG/DL (ref 0.76–1.27)
D DIMER PPP FEU-MCNC: 0.52 MCGFEU/ML (ref 0–0.69)
D-LACTATE SERPL-SCNC: 1.2 MMOL/L (ref 0.5–2)
DEPRECATED RDW RBC AUTO: 49.4 FL (ref 37–54)
DEPRECATED RDW RBC AUTO: 49.4 FL (ref 37–54)
EGFRCR SERPLBLD CKD-EPI 2021: 73.5 ML/MIN/1.73
EGFRCR SERPLBLD CKD-EPI 2021: 86.6 ML/MIN/1.73
EOSINOPHIL # BLD AUTO: 0.12 10*3/MM3 (ref 0–0.4)
EOSINOPHIL # BLD AUTO: 0.13 10*3/MM3 (ref 0–0.4)
EOSINOPHIL NFR BLD AUTO: 1.7 % (ref 0.3–6.2)
EOSINOPHIL NFR BLD AUTO: 2.1 % (ref 0.3–6.2)
ERYTHROCYTE [DISTWIDTH] IN BLOOD BY AUTOMATED COUNT: 14.4 % (ref 12.3–15.4)
ERYTHROCYTE [DISTWIDTH] IN BLOOD BY AUTOMATED COUNT: 14.5 % (ref 12.3–15.4)
FLUAV H1 2009 PAND RNA NPH QL NAA+PROBE: NOT DETECTED
FLUAV H1 HA GENE NPH QL NAA+PROBE: NOT DETECTED
FLUAV H3 RNA NPH QL NAA+PROBE: NOT DETECTED
FLUAV SUBTYP SPEC NAA+PROBE: NOT DETECTED
FLUBV RNA ISLT QL NAA+PROBE: NOT DETECTED
GEN 5 1HR TROPONIN T REFLEX: 41 NG/L
GLOBULIN UR ELPH-MCNC: 3 GM/DL
GLUCOSE BLDC GLUCOMTR-MCNC: 142 MG/DL (ref 70–130)
GLUCOSE BLDC GLUCOMTR-MCNC: 212 MG/DL (ref 70–130)
GLUCOSE BLDC GLUCOMTR-MCNC: 298 MG/DL (ref 70–130)
GLUCOSE BLDC GLUCOMTR-MCNC: 298 MG/DL (ref 70–130)
GLUCOSE SERPL-MCNC: 158 MG/DL (ref 65–99)
GLUCOSE SERPL-MCNC: 193 MG/DL (ref 65–99)
HADV DNA SPEC NAA+PROBE: NOT DETECTED
HBA1C MFR BLD: 6.9 % (ref 4.8–5.6)
HCOV 229E RNA SPEC QL NAA+PROBE: NOT DETECTED
HCOV HKU1 RNA SPEC QL NAA+PROBE: NOT DETECTED
HCOV NL63 RNA SPEC QL NAA+PROBE: NOT DETECTED
HCOV OC43 RNA SPEC QL NAA+PROBE: NOT DETECTED
HCT VFR BLD AUTO: 44.9 % (ref 37.5–51)
HCT VFR BLD AUTO: 45.3 % (ref 37.5–51)
HDLC SERPL-MCNC: 32 MG/DL (ref 40–60)
HGB BLD-MCNC: 14.6 G/DL (ref 13–17.7)
HGB BLD-MCNC: 14.8 G/DL (ref 13–17.7)
HMPV RNA NPH QL NAA+NON-PROBE: NOT DETECTED
HPIV1 RNA ISLT QL NAA+PROBE: NOT DETECTED
HPIV2 RNA SPEC QL NAA+PROBE: NOT DETECTED
HPIV3 RNA NPH QL NAA+PROBE: NOT DETECTED
HPIV4 P GENE NPH QL NAA+PROBE: NOT DETECTED
IMM GRANULOCYTES # BLD AUTO: 0.01 10*3/MM3 (ref 0–0.05)
IMM GRANULOCYTES # BLD AUTO: 0.02 10*3/MM3 (ref 0–0.05)
IMM GRANULOCYTES NFR BLD AUTO: 0.2 % (ref 0–0.5)
IMM GRANULOCYTES NFR BLD AUTO: 0.3 % (ref 0–0.5)
L PNEUMO1 AG UR QL IA: NEGATIVE
LDLC SERPL CALC-MCNC: 110 MG/DL (ref 0–100)
LDLC/HDLC SERPL: 3.36 {RATIO}
LEFT ATRIUM VOLUME INDEX: 19.3 ML/M2
LIPASE SERPL-CCNC: 17 U/L (ref 13–60)
LV EF BIPLANE MOD: 41.1 %
LYMPHOCYTES # BLD AUTO: 1.71 10*3/MM3 (ref 0.7–3.1)
LYMPHOCYTES # BLD AUTO: 2.3 10*3/MM3 (ref 0.7–3.1)
LYMPHOCYTES NFR BLD AUTO: 30.5 % (ref 19.6–45.3)
LYMPHOCYTES NFR BLD AUTO: 30.7 % (ref 19.6–45.3)
M PNEUMO IGG SER IA-ACNC: NOT DETECTED
MAGNESIUM SERPL-MCNC: 2 MG/DL (ref 1.6–2.4)
MCH RBC QN AUTO: 29.7 PG (ref 26.6–33)
MCH RBC QN AUTO: 30.8 PG (ref 26.6–33)
MCHC RBC AUTO-ENTMCNC: 32.2 G/DL (ref 31.5–35.7)
MCHC RBC AUTO-ENTMCNC: 33 G/DL (ref 31.5–35.7)
MCV RBC AUTO: 92.3 FL (ref 79–97)
MCV RBC AUTO: 93.3 FL (ref 79–97)
MONOCYTES # BLD AUTO: 0.55 10*3/MM3 (ref 0.1–0.9)
MONOCYTES # BLD AUTO: 0.68 10*3/MM3 (ref 0.1–0.9)
MONOCYTES NFR BLD AUTO: 9.1 % (ref 5–12)
MONOCYTES NFR BLD AUTO: 9.8 % (ref 5–12)
MRSA DNA SPEC QL NAA+PROBE: NEGATIVE
NEUTROPHILS NFR BLD AUTO: 3.17 10*3/MM3 (ref 1.7–7)
NEUTROPHILS NFR BLD AUTO: 4.31 10*3/MM3 (ref 1.7–7)
NEUTROPHILS NFR BLD AUTO: 56.5 % (ref 42.7–76)
NEUTROPHILS NFR BLD AUTO: 57.4 % (ref 42.7–76)
NRBC BLD AUTO-RTO: 0 /100 WBC (ref 0–0.2)
NRBC BLD AUTO-RTO: 0 /100 WBC (ref 0–0.2)
NT-PROBNP SERPL-MCNC: 2477 PG/ML (ref 0–900)
PLATELET # BLD AUTO: 228 10*3/MM3 (ref 140–450)
PLATELET # BLD AUTO: 258 10*3/MM3 (ref 140–450)
PMV BLD AUTO: 10.6 FL (ref 6–12)
PMV BLD AUTO: 11.2 FL (ref 6–12)
POTASSIUM SERPL-SCNC: 3.8 MMOL/L (ref 3.5–5.2)
POTASSIUM SERPL-SCNC: 4.2 MMOL/L (ref 3.5–5.2)
PROCALCITONIN SERPL-MCNC: 0.05 NG/ML (ref 0–0.25)
PROT SERPL-MCNC: 7 G/DL (ref 6–8.5)
QT INTERVAL: 432 MS
QTC INTERVAL: 528 MS
RBC # BLD AUTO: 4.81 10*6/MM3 (ref 4.14–5.8)
RBC # BLD AUTO: 4.91 10*6/MM3 (ref 4.14–5.8)
RHINOVIRUS RNA SPEC NAA+PROBE: NOT DETECTED
RSV RNA NPH QL NAA+NON-PROBE: NOT DETECTED
S PNEUM AG SPEC QL LA: NEGATIVE
SARS-COV-2 RNA NPH QL NAA+NON-PROBE: NORMAL
SODIUM SERPL-SCNC: 141 MMOL/L (ref 136–145)
SODIUM SERPL-SCNC: 143 MMOL/L (ref 136–145)
TRIGL SERPL-MCNC: 137 MG/DL (ref 0–150)
TROPONIN T % DELTA: 5
TROPONIN T NUMERIC DELTA: 2 NG/L
TROPONIN T SERPL HS-MCNC: 39 NG/L
TSH SERPL DL<=0.05 MIU/L-ACNC: 2.53 UIU/ML (ref 0.27–4.2)
VLDLC SERPL-MCNC: 25 MG/DL (ref 5–40)
WBC NRBC COR # BLD AUTO: 5.61 10*3/MM3 (ref 3.4–10.8)
WBC NRBC COR # BLD AUTO: 7.5 10*3/MM3 (ref 3.4–10.8)

## 2025-03-30 PROCEDURE — 99204 OFFICE O/P NEW MOD 45 MIN: CPT | Performed by: INTERNAL MEDICINE

## 2025-03-30 PROCEDURE — 82948 REAGENT STRIP/BLOOD GLUCOSE: CPT

## 2025-03-30 PROCEDURE — 63710000001 INSULIN LISPRO (HUMAN) PER 5 UNITS: Performed by: HOSPITALIST

## 2025-03-30 PROCEDURE — 83880 ASSAY OF NATRIURETIC PEPTIDE: CPT | Performed by: NURSE PRACTITIONER

## 2025-03-30 PROCEDURE — G0378 HOSPITAL OBSERVATION PER HR: HCPCS

## 2025-03-30 PROCEDURE — 80061 LIPID PANEL: CPT | Performed by: NURSE PRACTITIONER

## 2025-03-30 PROCEDURE — 94761 N-INVAS EAR/PLS OXIMETRY MLT: CPT

## 2025-03-30 PROCEDURE — 63710000001 INSULIN LISPRO (HUMAN) PER 5 UNITS: Performed by: NURSE PRACTITIONER

## 2025-03-30 PROCEDURE — 25010000002 HEPARIN (PORCINE) PER 1000 UNITS: Performed by: NURSE PRACTITIONER

## 2025-03-30 PROCEDURE — 80050 GENERAL HEALTH PANEL: CPT | Performed by: NURSE PRACTITIONER

## 2025-03-30 PROCEDURE — 25010000002 FUROSEMIDE PER 20 MG: Performed by: NURSE PRACTITIONER

## 2025-03-30 PROCEDURE — 63710000001 INSULIN GLARGINE PER 5 UNITS: Performed by: NURSE PRACTITIONER

## 2025-03-30 PROCEDURE — 87154 CUL TYP ID BLD PTHGN 6+ TRGT: CPT | Performed by: NURSE PRACTITIONER

## 2025-03-30 PROCEDURE — 83605 ASSAY OF LACTIC ACID: CPT | Performed by: NURSE PRACTITIONER

## 2025-03-30 PROCEDURE — 87040 BLOOD CULTURE FOR BACTERIA: CPT | Performed by: NURSE PRACTITIONER

## 2025-03-30 PROCEDURE — 83735 ASSAY OF MAGNESIUM: CPT | Performed by: NURSE PRACTITIONER

## 2025-03-30 PROCEDURE — 80048 BASIC METABOLIC PNL TOTAL CA: CPT | Performed by: NURSE PRACTITIONER

## 2025-03-30 PROCEDURE — 94799 UNLISTED PULMONARY SVC/PX: CPT

## 2025-03-30 PROCEDURE — 83690 ASSAY OF LIPASE: CPT | Performed by: NURSE PRACTITIONER

## 2025-03-30 PROCEDURE — 87147 CULTURE TYPE IMMUNOLOGIC: CPT | Performed by: NURSE PRACTITIONER

## 2025-03-30 PROCEDURE — 94640 AIRWAY INHALATION TREATMENT: CPT

## 2025-03-30 PROCEDURE — 83036 HEMOGLOBIN GLYCOSYLATED A1C: CPT | Performed by: NURSE PRACTITIONER

## 2025-03-30 PROCEDURE — 93306 TTE W/DOPPLER COMPLETE: CPT

## 2025-03-30 PROCEDURE — 85379 FIBRIN DEGRADATION QUANT: CPT | Performed by: NURSE PRACTITIONER

## 2025-03-30 PROCEDURE — 84484 ASSAY OF TROPONIN QUANT: CPT | Performed by: NURSE PRACTITIONER

## 2025-03-30 PROCEDURE — 84145 PROCALCITONIN (PCT): CPT | Performed by: NURSE PRACTITIONER

## 2025-03-30 PROCEDURE — 99232 SBSQ HOSP IP/OBS MODERATE 35: CPT | Performed by: HOSPITALIST

## 2025-03-30 PROCEDURE — 93306 TTE W/DOPPLER COMPLETE: CPT | Performed by: INTERNAL MEDICINE

## 2025-03-30 PROCEDURE — 25010000002 AMPICILLIN-SULBACTAM PER 1.5 G: Performed by: NURSE PRACTITIONER

## 2025-03-30 PROCEDURE — 85025 COMPLETE CBC W/AUTO DIFF WBC: CPT | Performed by: NURSE PRACTITIONER

## 2025-03-30 RX ORDER — INSULIN LISPRO 100 [IU]/ML
2 INJECTION, SOLUTION INTRAVENOUS; SUBCUTANEOUS
Status: DISCONTINUED | OUTPATIENT
Start: 2025-03-30 | End: 2025-04-03 | Stop reason: HOSPADM

## 2025-03-30 RX ORDER — SODIUM CHLORIDE FOR INHALATION 7 %
4 VIAL, NEBULIZER (ML) INHALATION
Status: COMPLETED | OUTPATIENT
Start: 2025-03-30 | End: 2025-03-31

## 2025-03-30 RX ORDER — POTASSIUM CHLORIDE 1500 MG/1
20 TABLET, EXTENDED RELEASE ORAL ONCE
Status: COMPLETED | OUTPATIENT
Start: 2025-03-30 | End: 2025-03-30

## 2025-03-30 RX ADMIN — GABAPENTIN 600 MG: 300 CAPSULE ORAL at 05:32

## 2025-03-30 RX ADMIN — IPRATROPIUM BROMIDE AND ALBUTEROL SULFATE 3 ML: 2.5; .5 SOLUTION RESPIRATORY (INHALATION) at 11:58

## 2025-03-30 RX ADMIN — ASPIRIN 162 MG: 325 TABLET ORAL at 09:25

## 2025-03-30 RX ADMIN — ROSUVASTATIN CALCIUM 20 MG: 20 TABLET, FILM COATED ORAL at 09:26

## 2025-03-30 RX ADMIN — HEPARIN SODIUM 5000 UNITS: 5000 INJECTION INTRAVENOUS; SUBCUTANEOUS at 20:56

## 2025-03-30 RX ADMIN — GABAPENTIN 600 MG: 300 CAPSULE ORAL at 15:51

## 2025-03-30 RX ADMIN — IPRATROPIUM BROMIDE AND ALBUTEROL SULFATE 3 ML: 2.5; .5 SOLUTION RESPIRATORY (INHALATION) at 22:39

## 2025-03-30 RX ADMIN — IPRATROPIUM BROMIDE AND ALBUTEROL SULFATE 3 ML: 2.5; .5 SOLUTION RESPIRATORY (INHALATION) at 08:13

## 2025-03-30 RX ADMIN — METOPROLOL TARTRATE 25 MG: 25 TABLET, FILM COATED ORAL at 20:58

## 2025-03-30 RX ADMIN — HEPARIN SODIUM 5000 UNITS: 5000 INJECTION INTRAVENOUS; SUBCUTANEOUS at 15:51

## 2025-03-30 RX ADMIN — DOXYCYCLINE 100 MG: 100 INJECTION, POWDER, LYOPHILIZED, FOR SOLUTION INTRAVENOUS at 01:54

## 2025-03-30 RX ADMIN — AMPICILLIN SODIUM AND SULBACTAM SODIUM 3 G: 2; 1 INJECTION, POWDER, FOR SOLUTION INTRAMUSCULAR; INTRAVENOUS at 17:51

## 2025-03-30 RX ADMIN — FUROSEMIDE 40 MG: 10 INJECTION, SOLUTION INTRAMUSCULAR; INTRAVENOUS at 20:56

## 2025-03-30 RX ADMIN — GUAIFENESIN 600 MG: 600 TABLET ORAL at 09:25

## 2025-03-30 RX ADMIN — POTASSIUM CHLORIDE 20 MEQ: 1500 TABLET, EXTENDED RELEASE ORAL at 05:32

## 2025-03-30 RX ADMIN — HEPARIN SODIUM 5000 UNITS: 5000 INJECTION INTRAVENOUS; SUBCUTANEOUS at 05:32

## 2025-03-30 RX ADMIN — INSULIN LISPRO 2 UNITS: 100 INJECTION, SOLUTION INTRAVENOUS; SUBCUTANEOUS at 17:51

## 2025-03-30 RX ADMIN — INSULIN LISPRO 4 UNITS: 100 INJECTION, SOLUTION INTRAVENOUS; SUBCUTANEOUS at 20:57

## 2025-03-30 RX ADMIN — GABAPENTIN 600 MG: 300 CAPSULE ORAL at 20:57

## 2025-03-30 RX ADMIN — POLYETHYLENE GLYCOL 3350 17 G: 17 POWDER, FOR SOLUTION ORAL at 09:26

## 2025-03-30 RX ADMIN — AMPICILLIN SODIUM AND SULBACTAM SODIUM 3 G: 2; 1 INJECTION, POWDER, FOR SOLUTION INTRAMUSCULAR; INTRAVENOUS at 12:15

## 2025-03-30 RX ADMIN — SENNOSIDES AND DOCUSATE SODIUM 2 TABLET: 50; 8.6 TABLET ORAL at 09:26

## 2025-03-30 RX ADMIN — INSULIN LISPRO 4 UNITS: 100 INJECTION, SOLUTION INTRAVENOUS; SUBCUTANEOUS at 17:50

## 2025-03-30 RX ADMIN — IPRATROPIUM BROMIDE AND ALBUTEROL SULFATE 3 ML: 2.5; .5 SOLUTION RESPIRATORY (INHALATION) at 16:20

## 2025-03-30 RX ADMIN — DOXYCYCLINE 100 MG: 100 INJECTION, POWDER, LYOPHILIZED, FOR SOLUTION INTRAVENOUS at 12:58

## 2025-03-30 RX ADMIN — GUAIFENESIN 600 MG: 600 TABLET ORAL at 01:09

## 2025-03-30 RX ADMIN — Medication 10 ML: at 09:26

## 2025-03-30 RX ADMIN — GUAIFENESIN 600 MG: 600 TABLET ORAL at 20:57

## 2025-03-30 RX ADMIN — FUROSEMIDE 40 MG: 10 INJECTION, SOLUTION INTRAMUSCULAR; INTRAVENOUS at 09:26

## 2025-03-30 RX ADMIN — DICLOFENAC SODIUM 2 G: 10 GEL TOPICAL at 21:06

## 2025-03-30 RX ADMIN — EMPAGLIFLOZIN 25 MG: 25 TABLET, FILM COATED ORAL at 09:25

## 2025-03-30 RX ADMIN — AVOBENZONE, HOMOSALATE, OCTISALATE, OCTOCRYLENE, AND OXYBENZONE 1 PACKET: 29.4; 147; 49; 25.4; 58.8 LOTION TOPICAL at 09:26

## 2025-03-30 RX ADMIN — INSULIN LISPRO 3 UNITS: 100 INJECTION, SOLUTION INTRAVENOUS; SUBCUTANEOUS at 12:16

## 2025-03-30 RX ADMIN — METOPROLOL TARTRATE 25 MG: 25 TABLET, FILM COATED ORAL at 09:25

## 2025-03-30 RX ADMIN — SENNOSIDES AND DOCUSATE SODIUM 2 TABLET: 50; 8.6 TABLET ORAL at 20:57

## 2025-03-30 RX ADMIN — IPRATROPIUM BROMIDE AND ALBUTEROL SULFATE 3 ML: 2.5; .5 SOLUTION RESPIRATORY (INHALATION) at 18:30

## 2025-03-30 RX ADMIN — Medication 10 ML: at 21:07

## 2025-03-30 RX ADMIN — INSULIN GLARGINE 10 UNITS: 100 INJECTION, SOLUTION SUBCUTANEOUS at 09:26

## 2025-03-30 RX ADMIN — AMPICILLIN SODIUM AND SULBACTAM SODIUM 3 G: 2; 1 INJECTION, POWDER, FOR SOLUTION INTRAMUSCULAR; INTRAVENOUS at 05:33

## 2025-03-30 RX ADMIN — PANTOPRAZOLE SODIUM 40 MG: 40 TABLET, DELAYED RELEASE ORAL at 05:32

## 2025-03-30 RX ADMIN — AMPICILLIN SODIUM AND SULBACTAM SODIUM 3 G: 2; 1 INJECTION, POWDER, FOR SOLUTION INTRAMUSCULAR; INTRAVENOUS at 01:08

## 2025-03-30 RX ADMIN — Medication 4 ML: at 16:20

## 2025-03-30 NOTE — PROGRESS NOTES
[facility] Medicine Services  PROGRESS NOTE    Patient Name: Scott Benz  : 1955  MRN: [id number]    Date of Admission: 3/29/2025  Primary Care Physician: Josh Green PA-C    Subjective   Subjective     CC: Dyspnea    HPI: Dyspnea better. Hungry. No f/c. No n/v. Worried about constipation, but having BM.       Objective   Objective     Vital Signs:   Temp:  [97.5 °F (36.4 °C)-98.6 °F (37 °C)] 97.5 °F (36.4 °C)  Heart Rate:  [75-98] 76  Resp:  [18-20] 18  BP: (138-150)/() 144/91  Flow (L/min) (Oxygen Therapy):  [1] 1     Physical Exam:  NAD, alert and oriented  OP clear, dry MM  Neck supple  No LAD  RRR  Decreased at bases, otherwise clear, rales at bases  +BS, soft, NT  LIN  Normal affect    Results Reviewed:  LAB RESULTS:      Lab 25  0825  1005   WBC 5.61 7.50 7.54   HEMOGLOBIN 14.8 14.6 14.5   HEMATOCRIT 44.9 45.3 45.8   PLATELETS 228 258 262   NEUTROS ABS 3.17 4.31 5.28   IMMATURE GRANS (ABS) 0.01 0.02 0.02   LYMPHS ABS 1.71 2.30 1.59   MONOS ABS 0.55 0.68 0.52   EOS ABS 0.12 0.13 0.07   MCV 93.3 92.3 94.0   PROCALCITONIN  --  0.05  --    LACTATE  --  1.2  --    D DIMER QUANT  --  0.52  --    HSTROP T 41* 39*  --          Lab 25  0826 25  0101 25  1437 25  1005   SODIUM 143 141  --  138   POTASSIUM 4.2 3.8  --  4.6   CHLORIDE 103 101  --  105   CO2 25.0 26.0  --  24.1   ANION GAP 15.0 14.0  --  8.9   BUN 26* 27*  --  30*   CREATININE 0.95 1.09  --  1.16   EGFR 86.6 73.5  --  68.2   GLUCOSE 158* 193*  --  302*   CALCIUM 8.8 9.0  --  9.1   MAGNESIUM  --  2.0 2  --    HEMOGLOBIN A1C  --  6.90*  --   --    TSH  --  2.530  --   --          Lab 25  01025  1005   TOTAL PROTEIN 7.0 6.9   ALBUMIN 4.0 4.0   GLOBULIN 3.0 2.9   ALT (SGPT) 23 35   AST (SGOT) 19 32   BILIRUBIN 0.8 0.5   ALK PHOS 80 86   LIPASE 17 20         Lab 25  0826 25  01025  1005   PROBNP  --  2,477.0* 1,915.0*   HSTROP T  41* 39*  --          Lab 03/30/25  0826   CHOLESTEROL 167   LDL CHOL 110*   HDL CHOL 32*   TRIGLYCERIDES 137             Brief Urine Lab Results  (Last result in the past 365 days)        Color   Clarity   Blood   Leuk Est   Nitrite   Protein   CREAT   Urine HCG        03/29/25 2258 Yellow   Clear   Trace   Moderate (2+)   Negative   Negative                   Microbiology Results Abnormal       None            XR Chest 1 View  Result Date: 3/29/2025  XR CHEST 1 VW, XR ABDOMEN KUB Date of Exam: 3/29/2025 9:38 PM EDT Indication: CHF exacerbation; dyspnea abdominal pain Comparison: 6/14/2022 Findings: Patchy airspace disease is seen within the bilateral lower lobes, right greater than left. Small bilateral pleural effusions are present, right greater than left.. No pneumothorax. The pulmonary vasculature appears within normal limits. The cardiac and mediastinal silhouette appear unremarkable. No acute osseous abnormality identified. No gross free air or pneumatosis though evaluation is slightly limited due to technique. There is a non obstructive bowel gas pattern. A mild to moderate stool burden is present.  No suspicious calcifications identified. No acute osseous abnormality identified.     Impression: Impression: 1.Bibasilar pneumonia, right greater than left with small bilateral pleural effusions, right greater than left. 2.Nonobstructive bowel gas pattern. Mild to moderate stool burden present. Electronically Signed: Jacquelyn Woodson MD  3/29/2025 10:02 PM EDT  Workstation ID: [id number]    XR Abdomen KUB  Result Date: 3/29/2025  XR CHEST 1 VW, XR ABDOMEN KUB Date of Exam: 3/29/2025 9:38 PM EDT Indication: CHF exacerbation; dyspnea abdominal pain Comparison: 6/14/2022 Findings: Patchy airspace disease is seen within the bilateral lower lobes, right greater than left. Small bilateral pleural effusions are present, right greater than left.. No pneumothorax. The pulmonary vasculature appears within normal limits. The  cardiac and mediastinal silhouette appear unremarkable. No acute osseous abnormality identified. No gross free air or pneumatosis though evaluation is slightly limited due to technique. There is a non obstructive bowel gas pattern. A mild to moderate stool burden is present.  No suspicious calcifications identified. No acute osseous abnormality identified.     Impression: Impression: 1.Bibasilar pneumonia, right greater than left with small bilateral pleural effusions, right greater than left. 2.Nonobstructive bowel gas pattern. Mild to moderate stool burden present. Electronically Signed: Jacquelyn Woodson MD  3/29/2025 10:02 PM EDT  Workstation ID: [id number]          Current medications:  Scheduled Meds:ampicillin-sulbactam, 3 g, Intravenous, Q6H  aspirin, 162 mg, Oral, Daily  doxycycline, 100 mg, Intravenous, Q12H  empagliflozin, 25 mg, Oral, Daily  furosemide, 40 mg, Intravenous, BID  gabapentin, 600 mg, Oral, Q8H  guaiFENesin, 600 mg, Oral, Q12H  heparin (porcine), 5,000 Units, Subcutaneous, Q8H  insulin glargine, 10 Units, Subcutaneous, Daily  insulin lispro, 2-7 Units, Subcutaneous, 4x Daily AC & at Bedtime  ipratropium-albuterol, 3 mL, Nebulization, Q4H - RT  metoprolol tartrate, 25 mg, Oral, BID  pantoprazole, 40 mg, Oral, Q AM  pharmacy consult - MTM, , Not Applicable, Daily  polyethylene glycol, 17 g, Oral, Daily  Psyllium, 1 packet, Oral, Daily  rosuvastatin, 20 mg, Oral, Daily  senna-docusate sodium, 2 tablet, Oral, BID  sodium chloride, 10 mL, Intravenous, Q12H      Continuous Infusions:   PRN Meds:.  acetaminophen **OR** acetaminophen **OR** acetaminophen    albuterol    senna-docusate sodium **AND** polyethylene glycol **AND** bisacodyl **AND** bisacodyl    Calcium Replacement - Follow Nurse / BPA Driven Protocol    dextrose    dextrose    glucagon (human recombinant)    Magnesium Standard Dose Replacement - Follow Nurse / BPA Driven Protocol    nitroglycerin    Phosphorus Replacement - Follow Nurse / BPA  Driven Protocol    Potassium Replacement - Follow Nurse / BPA Driven Protocol    promethazine **OR** promethazine    sodium chloride    sodium chloride    Assessment & Plan   Assessment & Plan     Active Hospital Problems    Diagnosis  POA    **CHF exacerbation [I50.9]  Yes    Arthritis [M19.90]  Yes    Stage 2 chronic kidney disease [N18.2]  Yes    Orthopnea [R06.01]  Unknown    RLQ abdominal pain [R10.31]  Unknown    Chronic idiopathic constipation [K59.04]  Unknown    Dysuria [R30.0]  Unknown    Pneumonia of both lower lobes due to infectious organism [J18.9]  Unknown    Unilateral AKA, right [S78.111A]  Yes    Type 2 diabetes mellitus with hyperglycemia, with long-term current use of insulin [E11.65, Z79.4]  Not Applicable    Dyspnea on exertion [R06.09]  Yes    Essential hypertension [I10]  Yes    Hyperlipidemia [E78.5]  Yes    Diabetic polyneuropathy associated with type 2 diabetes mellitus [E11.42]  Yes      Resolved Hospital Problems    Diagnosis Date Resolved POA    Diabetic nephropathy [E11.21] 03/29/2025 Yes        Brief Hospital Course to date:  Scott Benz is a 69 y.o. male with history of CKD, chronic constipation, DM, HTN, HL, progressive dyspnea    A/C CHF  CARMONA  Orthopnea  -ECHO pending  -continue diuresis per cardiology, on lasix  -on jardiance  -entresto per cardiology  -possible ischemic evaluation, pending ECHO    Possible superimposed PNA  -continue antibiotics for now, pulmonary toilet    RLQ pain  Constipation  -bowel regimen, better after BM    HTN  HL  History of CVA  -on BB, asa/statin    PAD, history of R AKA secondary to osteomyelitis    DM  PN  -insulin, titrate prn    CKD  -monitor renal function on diuretics      Expected Discharge Location and Transportation: Home  Expected Discharge   Expected Discharge Date: 3/31/2025; Expected Discharge Time:      VTE Prophylaxis:  Pharmacologic VTE prophylaxis orders are present.         AM-PAC 6 Clicks Score (PT): 11 (03/29/25 2037)    CODE  STATUS:   Code Status and Medical Interventions: CPR (Attempt to Resuscitate); Full Support   Ordered at: 03/29/25 2149     Code Status (Patient has no pulse and is not breathing):    CPR (Attempt to Resuscitate)     Medical Interventions (Patient has pulse or is breathing):    Full Support       Ministerio Betancourt MD  03/30/25

## 2025-03-30 NOTE — PLAN OF CARE
Problem: Adult Inpatient Plan of Care  Goal: Plan of Care Review  Outcome: Progressing  Goal: Patient-Specific Goal (Individualized)  Outcome: Progressing  Goal: Absence of Hospital-Acquired Illness or Injury  Outcome: Progressing  Intervention: Identify and Manage Fall Risk  Recent Flowsheet Documentation  Taken 3/30/2025 0400 by Alejandro Aj RN  Safety Promotion/Fall Prevention:   activity supervised   safety round/check completed  Taken 3/30/2025 0200 by Alejandro Aj RN  Safety Promotion/Fall Prevention:   activity supervised   safety round/check completed  Taken 3/30/2025 0000 by Alejandro Aj RN  Safety Promotion/Fall Prevention:   activity supervised   safety round/check completed  Taken 3/29/2025 2200 by Alejandro Aj RN  Safety Promotion/Fall Prevention:   activity supervised   safety round/check completed  Taken 3/29/2025 2011 by Alejandro Aj RN  Safety Promotion/Fall Prevention:   activity supervised   safety round/check completed  Intervention: Prevent Skin Injury  Recent Flowsheet Documentation  Taken 3/30/2025 0400 by Alejandro Aj RN  Body Position:   position changed independently   side-lying   left  Skin Protection:   drying agents applied   incontinence pads utilized   skin sealant/moisture barrier applied   transparent dressing maintained   silicone foam dressing in place  Taken 3/30/2025 0200 by Alejandro Aj RN  Body Position:   position changed independently   right   side-lying  Skin Protection:   drying agents applied   incontinence pads utilized   skin sealant/moisture barrier applied   transparent dressing maintained   silicone foam dressing in place  Taken 3/30/2025 0000 by Alejandro Aj RN  Body Position:   side-lying   right  Skin Protection:   drying agents applied   incontinence pads utilized   skin sealant/moisture barrier applied   transparent dressing maintained   silicone foam dressing in place  Taken 3/29/2025 2200 by Alejandro Aj RN  Body  Position:   position changed independently   supine  Skin Protection:   drying agents applied   incontinence pads utilized   skin sealant/moisture barrier applied   transparent dressing maintained   silicone foam dressing in place  Taken 3/29/2025 2011 by Alejandro Aj RN  Body Position: supine  Skin Protection:   drying agents applied   incontinence pads utilized   skin sealant/moisture barrier applied   transparent dressing maintained   silicone foam dressing in place  Intervention: Prevent and Manage VTE (Venous Thromboembolism) Risk  Recent Flowsheet Documentation  Taken 3/30/2025 0400 by Alejandro Aj RN  VTE Prevention/Management:   SCDs (sequential compression devices) off   patient refused intervention  Taken 3/30/2025 0200 by Alejandro Aj RN  VTE Prevention/Management:   SCDs (sequential compression devices) off   patient refused intervention  Taken 3/30/2025 0000 by Alejandro Aj RN  VTE Prevention/Management:   SCDs (sequential compression devices) off   patient refused intervention  Taken 3/29/2025 2200 by Alejandro Aj RN  VTE Prevention/Management:   SCDs (sequential compression devices) off   patient refused intervention  Taken 3/29/2025 2011 by Alejandro Aj RN  VTE Prevention/Management: (HEPARIN SQ)   SCDs (sequential compression devices) off   patient refused intervention  Intervention: Prevent Infection  Recent Flowsheet Documentation  Taken 3/30/2025 0400 by Alejandro Aj RN  Infection Prevention: environmental surveillance performed  Taken 3/30/2025 0200 by Alejandro Aj RN  Infection Prevention: environmental surveillance performed  Taken 3/30/2025 0000 by Alejandro Aj RN  Infection Prevention: environmental surveillance performed  Taken 3/29/2025 2200 by Alejandro Aj RN  Infection Prevention: environmental surveillance performed  Taken 3/29/2025 2011 by Alejandro Aj RN  Infection Prevention: environmental surveillance performed  Goal: Optimal  Comfort and Wellbeing  Outcome: Progressing  Intervention: Provide Person-Centered Care  Recent Flowsheet Documentation  Taken 3/30/2025 0400 by Alejandro Aj RN  Trust Relationship/Rapport:   care explained   choices provided   emotional support provided   empathic listening provided   questions answered   questions encouraged   reassurance provided   thoughts/feelings acknowledged  Taken 3/30/2025 0200 by Alejandro Aj RN  Trust Relationship/Rapport:   care explained   choices provided   emotional support provided   empathic listening provided   questions answered   questions encouraged   reassurance provided   thoughts/feelings acknowledged  Taken 3/30/2025 0000 by Alejandro Aj RN  Trust Relationship/Rapport:   care explained   choices provided   emotional support provided   empathic listening provided   questions answered   questions encouraged   reassurance provided   thoughts/feelings acknowledged  Taken 3/29/2025 2200 by Alejandro Aj RN  Trust Relationship/Rapport:   care explained   choices provided   emotional support provided   empathic listening provided   questions answered   questions encouraged   reassurance provided   thoughts/feelings acknowledged  Taken 3/29/2025 2011 by Alejandro Aj RN  Trust Relationship/Rapport:   care explained   choices provided   emotional support provided   empathic listening provided   questions answered   questions encouraged   reassurance provided   thoughts/feelings acknowledged  Goal: Readiness for Transition of Care  Outcome: Progressing  Intervention: Mutually Develop Transition Plan  Recent Flowsheet Documentation  Taken 3/29/2025 2041 by Alejandro Aj RN  Transportation Anticipated: family or friend will provide  Patient/Family Anticipated Services at Transition: none  Patient/Family Anticipates Transition to: home with family  Taken 3/29/2025 2035 by Alejandro Aj RN  Equipment Currently Used at Home: (RLE PROSTHESIS)   cane,  straight   wheelchair   other (see comments)   Goal Outcome Evaluation:

## 2025-03-30 NOTE — CONSULTS
[facility]  Cardiovascular Consultation Note    Reason for consultation: New onset CHF with 2 abnormal EKG showing left bundle branch block    History of Present Illness:  69-year-old male non-smoker, has diabetes, hypertension, hyperlipidemia, GERD who presents complaining of shortness of breath,, edema and orthopnea.  His last known EF in 2022 was normal he was also told to come to the ER because of abnormal labs.  Patient states he had no cardiac symptoms or any issues until September.  At that time he began to have shortness of breath and edema..  He denies any tightness heaviness squeezing pressure chest jaw throat arms.  He did not know he had a left bundle branch block.  He does not feel his heart fluttering.  He states he had profound lower extremity edema when he came in it.  Since receiving some diuretics his breathing is improved.  States he had a stroke before with minimal changes to the left upper extremity.    Cardiac risk factors: #1 male sex #2 [facility]rium 55 #3 hypertension #4 hyperlipidemia #5 diabetes #6 vascular disease    Past medical and surgical history, social and family history reviewed in EMR.    REVIEW OF SYSTEMS:     Past Medical History:   Diagnosis Date    Arthritis     with hands    CHF (congestive heart failure)     CKD (chronic kidney disease)     CVA (cerebral vascular accident)     Diabetes     H/O lower limb amputation     Hypertension     Stroke 11/10/2019     Past Surgical History:   Procedure Laterality Date    ABOVE KNEE LEG AMPUTATION      AMPUTATION DIGIT Left 07/23/2019    Procedure: excision left 4th metatarsal;  Surgeon: Shannan Kraft MD;  Location: [facility];  Service: Orthopedics    APPENDECTOMY      INCISION AND DRAINAGE LEG Left 07/23/2019    Procedure: I&D left foot wound;  Surgeon: Shannan Kraft MD;  Location: [facility];  Service: Orthopedics     Social History     Socioeconomic History    Marital status:    Tobacco Use     Smoking status: Never    Smokeless tobacco: Never   Vaping Use    Vaping status: Never Used   Substance and Sexual Activity    Alcohol use: No    Drug use: No    Sexual activity: Defer     Family History   Problem Relation Age of Onset    Stroke Father     Diabetes Father        H&P ROS reviewed and pertinent CV ROS as noted in HPI.    Cardiac: Complains of lower extremity swelling.  Complains of increased dyspnea and significant orthopnea/no neck chest jaw pain  Respiratory: Complains of significant dyspnea on exertion.  No hemoptysis  Lower Extremities: Patient has peripheral vascular disease with previous AKA as well as a femoropopliteal bypass  GI: Complains of constipation  Neuro: Has a history of stroke  Hematology: No bleeding diathesis ecchymosis or petechiae  Renal: Has mildly reduced GFR which is improved from yesterday  Musculoskeletal: Status post right AKA  Endocrine: Has diabetes but no known hypothyroidism  Constitutional: No fever chills or weight loss  Psych: No depression or eron suicidal ideation      Physical Exam: General Pleasant middle-age male in bed at a 60 degree angle with no resting dyspnea or tachypnea       HEENT: No bruits.  No significant JVP.  No icterus       Respiratory: Equal bilateral symmetrical expansion with bilateral rales       Cardiovascular: Regular rate and rhythm with frequent ectopics and a systolic ejection murmur and pitting edema of the left lower extremities       GI: Soft no obvious bruits       Lower Extremities: Status post right AKA       Neuro: Facial expressions are symmetrical moves all 4 extremities       Skin: Warm and dry with pitting edema left lower extremity       Psych: Pleasant affect orient x 3    Results Review: EKG shows sinus rhythm left bundle branch block.  Bundle branch block is new from EKG dated 6/20/2022.  I personally viewed the chest x-ray which shows some pleural effusions.  Hemoglobin 14.8.  BNP is 2477.  HST is elevated but flat at  39 and 41.  GFR was 68.2 on admission and is now 73.5.           Vital Sign Min/Max for last 24 hours  Temp  Min: 97.5 °F (36.4 °C)  Max: 98.6 °F (37 °C)   BP  Min: 138/89  Max: 150/100   Pulse  Min: 75  Max: 98   Resp  Min: 18  Max: 20   SpO2  Min: 93 %  Max: 98 %   No data recorded      Intake/Output Summary (Last 24 hours) at 3/30/2025 0914  Last data filed at 3/30/2025 0055  Gross per 24 hour   Intake 240 ml   Output 1550 ml   Net -1310 ml             Current Facility-Administered Medications:     acetaminophen (TYLENOL) tablet 650 mg, 650 mg, Oral, Q4H PRN **OR** acetaminophen (TYLENOL) 160 MG/5ML oral solution 650 mg, 650 mg, Oral, Q4H PRN **OR** acetaminophen (TYLENOL) suppository 650 mg, 650 mg, Rectal, Q4H PRN, Ashlee Rivera, APRN    albuterol (PROVENTIL) nebulizer solution 0.083% 2.5 mg/3mL, 2.5 mg, Nebulization, Q4H PRN, Ashlee Rivera, APRN    ampicillin-sulbactam (UNASYN) 3 g in sodium chloride 0.9 % 100 mL MBP, 3 g, Intravenous, Q6H, Ashlee Rivera, APRN, 3 g at 03/30/25 0533    aspirin tablet 162 mg, 162 mg, Oral, Daily, Ashlee Rivera, APRN    sennosides-docusate (PERICOLACE) 8.6-50 MG per tablet 2 tablet, 2 tablet, Oral, BID, 2 tablet at 03/29/25 2230 **AND** polyethylene glycol (MIRALAX) packet 17 g, 17 g, Oral, Daily PRN **AND** bisacodyl (DULCOLAX) EC tablet 5 mg, 5 mg, Oral, Daily PRN **AND** bisacodyl (DULCOLAX) suppository 10 mg, 10 mg, Rectal, Daily PRN, Ashlee Rivera, APRN    Calcium Replacement - Follow Nurse / BPA Driven Protocol, , Not Applicable, PRN, Ashlee Rivera, APRN    dextrose (D50W) (25 g/50 mL) IV injection 25 g, 25 g, Intravenous, Q15 Min PRN, Ashlee Rivera, TOMÁS    dextrose (GLUTOSE) oral gel 15 g, 15 g, Oral, Q15 Min PRN, Ashlee Rivera APRN    doxycycline (VIBRAMYCIN) 100 mg in sodium chloride 0.9 % 100 mL MBP, 100 mg, Intravenous, Q12H, Ashlee Rivera APRN, 100 mg at 03/30/25 0154    empagliflozin (JARDIANCE) tablet 25 mg, 25 mg, Oral, Daily, Ashlee Rivera, APRN     furosemide (LASIX) injection 40 mg, 40 mg, Intravenous, BID, Ashlee Rivera APRN, 40 mg at 03/29/25 2230    gabapentin (NEURONTIN) capsule 600 mg, 600 mg, Oral, Q8H, Ashlee Rivera, APRN, 600 mg at 03/30/25 0532    glucagon (GLUCAGEN) injection 1 mg, 1 mg, Intramuscular, Q15 Min PRN, Ashlee Rivera APRN    guaiFENesin (MUCINEX) 12 hr tablet 600 mg, 600 mg, Oral, Q12H, Ashlee Rivera, APRN, 600 mg at 03/30/25 0109    heparin (porcine) 5000 UNIT/ML injection 5,000 Units, 5,000 Units, Subcutaneous, Q8H, Ashlee Rivera APRN, 5,000 Units at 03/30/25 0532    insulin glargine (LANTUS, SEMGLEE) injection 10 Units, 10 Units, Subcutaneous, Daily, Ashlee Rivera APRN    Insulin Lispro (humaLOG) injection 2-7 Units, 2-7 Units, Subcutaneous, 4x Daily AC & at Bedtime, Ashlee Rivera APRN    ipratropium-albuterol (DUO-NEB) nebulizer solution 3 mL, 3 mL, Nebulization, Q4H - RT, Ashlee Rivera APRN, 3 mL at 03/30/25 0813    Magnesium Standard Dose Replacement - Follow Nurse / BPA Driven Protocol, , Not Applicable, PRN, Ashlee Rivera APRN    metoprolol tartrate (LOPRESSOR) tablet 25 mg, 25 mg, Oral, BID, Ashlee Rivera APRN, 25 mg at 03/29/25 2230    nitroglycerin (NITROSTAT) SL tablet 0.4 mg, 0.4 mg, Sublingual, Q5 Min PRN, Ashlee Rivera APRN    pantoprazole (PROTONIX) EC tablet 40 mg, 40 mg, Oral, Q AM, Ashlee Rivera APRN, 40 mg at 03/30/25 0532    Phosphorus Replacement - Follow Nurse / BPA Driven Protocol, , Not Applicable, PRN, Ashlee Rivera, APRN    polyethylene glycol (MIRALAX) packet 17 g, 17 g, Oral, Daily, Vibbert, Ashlee M, APRN    Potassium Replacement - Follow Nurse / BPA Driven Protocol, , Not Applicable, PRN, Ashlee Rivera, TOMÁS    promethazine (PHENERGAN) tablet 12.5 mg, 12.5 mg, Oral, Q6H PRN **OR** promethazine (PHENERGAN) suppository 12.5 mg, 12.5 mg, Rectal, Q6H PRN, Ashlee Rivera, TOMÁS    Psyllium (METAMUCIL MULTIHEALTH FIBER) 51.7 % packet 1 packet, 1 packet, Oral, Daily, Ashlee Rivera, APRN     rosuvastatin (CRESTOR) tablet 20 mg, 20 mg, Oral, Daily, VibAshlee unger M, APRN    sodium chloride 0.9 % flush 10 mL, 10 mL, Intravenous, Q12H, VibAshlee unger, APRN, 10 mL at 03/29/25 2231    sodium chloride 0.9 % flush 10 mL, 10 mL, Intravenous, PRN, [place], Ashlee M, APRN    sodium chloride 0.9 % infusion 40 mL, 40 mL, Intravenous, PRN, VibAshlee unger, APRN    Lab Review:   Results from last 7 days   Lab Units 03/30/25  0826 03/30/25  0101 03/28/25  1005   WBC 10*3/mm3 5.61 7.50 7.54   HEMOGLOBIN g/dL 14.8 14.6 14.5   PLATELETS 10*3/mm3 228 258 262     Results from last 7 days   Lab Units 03/30/25  0101 03/29/25  1437 03/28/25  1005   SODIUM mmol/L 141  --  138   POTASSIUM mmol/L 3.8  --  4.6   CO2 mmol/L 26.0  --  24.1   BUN mg/dL 27*  --  30*   CREATININE mg/dL 1.09  --  1.16   MAGNESIUM mg/dL 2.0 2  --    GLUCOSE mg/dL 193*  --  302*     Estimated Creatinine Clearance: 72.4 mL/min (by C-G formula based on SCr of 1.09 mg/dL).    Results from last 7 days   Lab Units 03/30/25  0101   HEMOGLOBIN A1C % 6.90*     .lipd  Lab Results   Component Value Date    AST 19 03/30/2025    ALT 23 03/30/2025       Radiology Reports:  Imaging Results (Last 72 Hours)       Procedure Component Value Units Date/Time    XR Chest 1 View [[id number]] Collected: 03/29/25 2201     Updated: 03/29/25 2205    Narrative:      XR CHEST 1 VW, XR ABDOMEN KUB    Date of Exam: 3/29/2025 9:38 PM EDT    Indication: CHF exacerbation; dyspnea abdominal pain    Comparison: 6/14/2022    Findings:  Patchy airspace disease is seen within the bilateral lower lobes, right greater than left. Small bilateral pleural effusions are present, right greater than left.. No pneumothorax. The pulmonary vasculature appears within normal limits. The cardiac and   mediastinal silhouette appear unremarkable. No acute osseous abnormality identified.    No gross free air or pneumatosis though evaluation is slightly limited due to technique. There is a non obstructive bowel gas  pattern. A mild to moderate stool burden is present.  No suspicious calcifications identified. No acute osseous abnormality   identified.      Impression:      Impression:  1.Bibasilar pneumonia, right greater than left with small bilateral pleural effusions, right greater than left.  2.Nonobstructive bowel gas pattern. Mild to moderate stool burden present.          Electronically Signed: Jacquelyn Woodson MD    3/29/2025 10:02 PM EDT    Workstation ID: [id number]    XR Abdomen KUB [[id number]] Collected: 03/29/25 2201     Updated: 03/29/25 2205    Narrative:      XR CHEST 1 VW, XR ABDOMEN KUB    Date of Exam: 3/29/2025 9:38 PM EDT    Indication: CHF exacerbation; dyspnea abdominal pain    Comparison: 6/14/2022    Findings:  Patchy airspace disease is seen within the bilateral lower lobes, right greater than left. Small bilateral pleural effusions are present, right greater than left.. No pneumothorax. The pulmonary vasculature appears within normal limits. The cardiac and   mediastinal silhouette appear unremarkable. No acute osseous abnormality identified.    No gross free air or pneumatosis though evaluation is slightly limited due to technique. There is a non obstructive bowel gas pattern. A mild to moderate stool burden is present.  No suspicious calcifications identified. No acute osseous abnormality   identified.      Impression:      Impression:  1.Bibasilar pneumonia, right greater than left with small bilateral pleural effusions, right greater than left.  2.Nonobstructive bowel gas pattern. Mild to moderate stool burden present.          Electronically Signed: Jacquelyn Woodson MD    3/29/2025 10:02 PM EDT    Workstation ID: [id number]            Assessment/Plan: This patient presents with worsening heart failure symptoms.  He  started having his symptoms of shortness of breath and lower extremity edema in September.  He has has no exertional angina.  Awaiting echocardiogram to assess EF.  If his EF is less than 50% I  recommend a pursue cardiac cath.  He has severe peripheral vascular disease, diabetes, hypertension hyperlipidemia and has very high risk of ischemic heart disease.  Will start Entresto.  He is on Jardiance.  Would continue diuretic  2 multiple CAD risk factors-needs an ischemic evaluation and may proceed to cardiac cath  3 patient may have a left bundle branch block myopathy.  He has frequent PVCs which can also lead to LV dysfunction      Ramon Sarabia MD  03/30/25  09:14 EDT

## 2025-03-30 NOTE — H&P
"    [facility] Medicine Services  HISTORY AND PHYSICAL    Patient Name: Scott Benz  : 1955  MRN: [id number]  Primary Care Physician: Josh Green PA-C  Date of admission: 3/29/2025    Subjective   Subjective     Chief Complaint:  Abnormal labs / shortness of breath / orthopnea    HPI:  Scott Benz is a 69 y.o. male with PMHx of CHF, HTN, HLD, PVD s/p R AKA, PAD s/p fem/tib bypass graft, peripheral neuropathy, T2DM - insulin dependent, CVA and arthritis who presented to [facility] after being called by his PCP with abnormal labs and concern for acute CHF exacerbation.    Pt reports 3-4 days of worsening orthopnea (he is sleeping in his wheelchair b/c he lays down and can't breathe), increased shortness of breath, LE swelling and fatigue. Went to PCP yesterday to establish care with a [facility] provider. Has been feeling poorly since January with GI concerns, nausea, bloating, abdominal pain on the right side. Has a GI referral in August. Having issues with constipation, did have 4 small BM's yesterday after taking miralax and metaucil. Was given zofran and omeprazole but these did not help and seemed to make his symptoms worse.    Was evaluated last  for similar symptoms, treated with diuretics, steroids and antibiotics per his reports and felt better. He was supposed to have a stress test and repeat ECHO but was never able to do this d/t bad weather and could not get to the appointment. He was prescribed lasix 20 mg PO at the PCP office and has taken this but did not seem to help much. He is not weight himself regularly. He uses his w/c for mobility. Was previously able to tolerate prosthesis but states \"I've let myself go\" and has not been able to use the prosthesis recently.    Transferring documents reviewed: no radiology results, labs w/ troponin 39.8/151.5/153.5; WBC 7.5, H/H 14.5/44.4, platelet 242; K 4.3, creat 1.06. EKG w/ prolonged QTc 512 ms and SR PVC's w/ fusion complexes. He " was given 324 mg aspirin and 40 mg IV lasix at OSH. Pt reports some improvement in his breathing since receiving IV lasix. Transfer was discussed with cardiology, Dr. Barrera who recommends further diuresis and ECHO in am. Pt will be admitted to the hospital medicine service.    Review of Systems   Constitutional:  Positive for activity change, appetite change (decreased appetite) and fatigue.   Respiratory:  Positive for chest tightness and shortness of breath. Negative for wheezing.    Cardiovascular:  Positive for leg swelling. Negative for chest pain and palpitations.   Gastrointestinal:  Positive for abdominal pain (R side), constipation and nausea. Negative for vomiting.   Genitourinary:  Positive for difficulty urinating and dysuria.   Musculoskeletal:  Positive for gait problem.   Neurological:  Positive for weakness (generalized).   Psychiatric/Behavioral:  Positive for sleep disturbance.    All other systems reviewed and are negative.          Personal History     Past Medical History:   Diagnosis Date    Arthritis     with hands    CHF (congestive heart failure)     CKD (chronic kidney disease)     CVA (cerebral vascular accident)     Diabetes     H/O lower limb amputation     Hypertension     Stroke 11/10/2019         Past Surgical History:   Procedure Laterality Date    ABOVE KNEE LEG AMPUTATION      AMPUTATION DIGIT Left 07/23/2019    Procedure: excision left 4th metatarsal;  Surgeon: Shannan Kraft MD;  Location:  [facility] OR;  Service: Orthopedics    APPENDECTOMY      INCISION AND DRAINAGE LEG Left 07/23/2019    Procedure: I&D left foot wound;  Surgeon: Shannan Kraft MD;  Location:  [facility] OR;  Service: Orthopedics       Family History:   family history includes Diabetes in his father; Stroke in his father.     Social History:  reports that he has never smoked. He has never used smokeless tobacco. He reports that he does not drink alcohol and does not use drugs.  Social History     Social History  Narrative    Not on file       Medications:  Insulin Glargine (2 Unit Dial), Polyethylene Glycol 3350, aspirin, dapagliflozin Propanediol, furosemide, gabapentin, glimepiride, insulin regular, metFORMIN, metoprolol tartrate, omeprazole, ondansetron, potassium chloride, psyllium, and rosuvastatin    No Known Allergies    Objective   Objective     Vital Signs:   Temp:  [97.7 °F (36.5 °C)] 97.7 °F (36.5 °C)  Heart Rate:  [96] 96  Resp:  [20] 20  BP: (139)/(116) 139/116    Physical Exam  Constitutional:       General: He is not in acute distress.     Appearance: He is well-developed. He is ill-appearing.   HENT:      Head: Normocephalic and atraumatic.      Nose: Nose normal.      Mouth/Throat:      Pharynx: Oropharynx is clear.   Eyes:      Extraocular Movements: Extraocular movements intact.      Conjunctiva/sclera: Conjunctivae normal.      Pupils: Pupils are equal, round, and reactive to light.   Cardiovascular:      Rate and Rhythm: Regular rhythm. Tachycardia present.      Pulses: Normal pulses.      Heart sounds: Normal heart sounds. No murmur heard.  Pulmonary:      Effort: Respiratory distress (labored respiratory effort w/ exertion and w/ conversation) present.      Breath sounds: Rales (posterior bases) present.      Comments: Room air oxygen saturation mid 90's  Abdominal:      General: Bowel sounds are normal. There is no distension.      Palpations: Abdomen is soft.      Tenderness: There is abdominal tenderness (RLQ). There is no guarding.   Musculoskeletal:         General: No swelling. Normal range of motion.      Cervical back: Normal range of motion and neck supple.   Skin:     General: Skin is warm and dry.      Capillary Refill: Capillary refill takes less than 2 seconds.      Findings: No rash.   Neurological:      Mental Status: He is alert and oriented to person, place, and time.      Cranial Nerves: No cranial nerve deficit.   Psychiatric:         Mood and Affect: Mood normal.         Behavior:  Behavior normal.           Result Review:  I have personally reviewed the results from the time of this admission to 3/29/2025 22:19 EDT and agree with these findings:  []  Laboratory list / accordion  []  Microbiology  []  Radiology  []  EKG/Telemetry   []  Cardiology/Vascular   []  Pathology  [x]  Old records  []  Other:  Most notable findings include:      LAB RESULTS:      Lab 03/28/25  1005   WBC 7.54   HEMOGLOBIN 14.5   HEMATOCRIT 45.8   PLATELETS 262   NEUTROS ABS 5.28   IMMATURE GRANS (ABS) 0.02   LYMPHS ABS 1.59   MONOS ABS 0.52   EOS ABS 0.07   MCV 94.0         Lab 03/29/25  1437 03/28/25  1005   SODIUM  --  138   POTASSIUM  --  4.6   CHLORIDE  --  105   CO2  --  24.1   ANION GAP  --  8.9   BUN  --  30*   CREATININE  --  1.16   EGFR  --  68.2   GLUCOSE  --  302*   CALCIUM  --  9.1   MAGNESIUM 2  --          Lab 03/28/25  1005   TOTAL PROTEIN 6.9   ALBUMIN 4.0   GLOBULIN 2.9   ALT (SGPT) 35   AST (SGOT) 32   BILIRUBIN 0.5   ALK PHOS 86   LIPASE 20         Lab 03/28/25  1005   PROBNP 1,915.0*                 Brief Urine Lab Results       None          Microbiology Results (last 10 days)       ** No results found for the last 240 hours. **            XR Chest 1 View  Result Date: 3/29/2025  XR CHEST 1 VW, XR ABDOMEN KUB Date of Exam: 3/29/2025 9:38 PM EDT Indication: CHF exacerbation; dyspnea abdominal pain Comparison: 6/14/2022 Findings: Patchy airspace disease is seen within the bilateral lower lobes, right greater than left. Small bilateral pleural effusions are present, right greater than left.. No pneumothorax. The pulmonary vasculature appears within normal limits. The cardiac and mediastinal silhouette appear unremarkable. No acute osseous abnormality identified. No gross free air or pneumatosis though evaluation is slightly limited due to technique. There is a non obstructive bowel gas pattern. A mild to moderate stool burden is present.  No suspicious calcifications identified. No acute osseous  abnormality identified.     Impression: Impression: 1.Bibasilar pneumonia, right greater than left with small bilateral pleural effusions, right greater than left. 2.Nonobstructive bowel gas pattern. Mild to moderate stool burden present. Electronically Signed: Jacquelyn Woodson MD  3/29/2025 10:02 PM EDT  Workstation ID: [id number]    XR Abdomen KUB  Result Date: 3/29/2025  XR CHEST 1 VW, XR ABDOMEN KUB Date of Exam: 3/29/2025 9:38 PM EDT Indication: CHF exacerbation; dyspnea abdominal pain Comparison: 6/14/2022 Findings: Patchy airspace disease is seen within the bilateral lower lobes, right greater than left. Small bilateral pleural effusions are present, right greater than left.. No pneumothorax. The pulmonary vasculature appears within normal limits. The cardiac and mediastinal silhouette appear unremarkable. No acute osseous abnormality identified. No gross free air or pneumatosis though evaluation is slightly limited due to technique. There is a non obstructive bowel gas pattern. A mild to moderate stool burden is present.  No suspicious calcifications identified. No acute osseous abnormality identified.     Impression: Impression: 1.Bibasilar pneumonia, right greater than left with small bilateral pleural effusions, right greater than left. 2.Nonobstructive bowel gas pattern. Mild to moderate stool burden present. Electronically Signed: Jacquelyn Woodson MD  3/29/2025 10:02 PM EDT  Workstation ID: [id number]          Assessment & Plan   Assessment & Plan       CHF exacerbation    Diabetic polyneuropathy associated with type 2 diabetes mellitus    Arthritis    Stage 2 chronic kidney disease    Type 2 diabetes mellitus with hyperglycemia, with long-term current use of insulin    Essential hypertension    Hyperlipidemia    Dyspnea on exertion    Unilateral AKA, right    Orthopnea    RLQ abdominal pain    Chronic idiopathic constipation    Dysuria    CHF exacerbation  Dyspnea on exertion  Orthopnea  - continue farxiga,  formulary sub to jardiance  - last ECHO 2022 w/ EF 60-65% and mild aortic stenosis  - s/p lasix 40 mg IV at OSH, repeat now and continue BID  - ECHO in am  - cardiology consult, Dr. Barrera  - daily weights, strict I/o  - EKG, troponin, BNP now  - BNP from PCP 1915 3/28/2025, previously 1440.0 6/14/2022  - HOB elevated for comfort  - oxygen PRN  - check d dimer, if positive will obtain CTA chest to r/o PE  - chest xray now, no results for chest xray found from OSH  -- chest xray showing bibasilar pneumonia L > R w/ small bilateral pleural effusions, adding unasyn / doxy, nebs, IS/flutter valve and will obtain blood cultures, procal, mrsa pcr nares, urinary ag for legionella and s.pnemo    Elevated troponin  Prolonged QTc  Tachycardia / PVC's on EKG  - repeat troponin and EKG now  - denies chest pain  - check tsh, mag    RLQ pain  Constipation, chronic  Dysuria  - continue PPI  - continue metamucil / miralax  - bowel regimen scheduled  - KUB now  -- showing mild to moderate stool burden w/ nonobstructive gas pattern  - urinalysis  - check lipase, lactic    HTN   HLD  Hx CVA  - continue metoprolol  - continue aspirin / statin    PAD / Hx of R AKA (r/t osteomyelitis)  - PT / OT consult for mobility  - previously using prosthesis but currently not able to and using w/c    T2DM - insulin dependent  Diabetic polyneuropathy  - lantus 10 units daily (on 22 units long acting at home)  - continue farxiga  - hold metformin, glimepiride  - continue gabapentin for neuropathy, home dose 600 mg TID  - fsbg achs w/ low dose ssi  - check A1c  - glu 302 on labs from PCP, 134 today    CKD 2  - creatinine 1.06 at OSH, baseline  - monitor closely w/ diuresis  - strict I/o       DVT prophylaxis:  Heparin SC TID    CODE STATUS:     Code Status (Patient has no pulse and is not breathing): CPR (Attempt to Resuscitate)  Medical Interventions (Patient has pulse or is breathing): Full Support      Expected Discharge     Expected Discharge Date:  3/31/2025; Expected Discharge Time:     Signature: Electronically signed by TOMÁS Vaughn, 03/29/25, 10:18 PM EDT    Total time spent: 70 minutes  Time spent includes time reviewing chart, face-to-face time, counseling patient/family/caregiver, ordering medications/tests/procedures, communicating with other health care professionals, documenting clinical information in the electronic health record, and coordination of care.

## 2025-03-31 ENCOUNTER — APPOINTMENT (OUTPATIENT)
Dept: GENERAL RADIOLOGY | Facility: HOSPITAL | Age: 70
End: 2025-03-31
Payer: MEDICARE

## 2025-03-31 LAB
ALBUMIN SERPL-MCNC: 4.1 G/DL (ref 3.5–5.2)
ALBUMIN/GLOB SERPL: 1.4 G/DL
ALP SERPL-CCNC: 90 U/L (ref 39–117)
ALT SERPL W P-5'-P-CCNC: 16 U/L (ref 1–41)
ANION GAP SERPL CALCULATED.3IONS-SCNC: 16 MMOL/L (ref 5–15)
AST SERPL-CCNC: 16 U/L (ref 1–40)
BACTERIA BLD CULT: ABNORMAL
BILIRUB SERPL-MCNC: 0.8 MG/DL (ref 0–1.2)
BOTTLE TYPE: ABNORMAL
BUN SERPL-MCNC: 30 MG/DL (ref 8–23)
BUN/CREAT SERPL: 21.3 (ref 7–25)
CALCIUM SPEC-SCNC: 9.4 MG/DL (ref 8.6–10.5)
CHLORIDE SERPL-SCNC: 98 MMOL/L (ref 98–107)
CO2 SERPL-SCNC: 29 MMOL/L (ref 22–29)
CREAT SERPL-MCNC: 1.41 MG/DL (ref 0.76–1.27)
DEPRECATED RDW RBC AUTO: 48.7 FL (ref 37–54)
EGFRCR SERPLBLD CKD-EPI 2021: 53.9 ML/MIN/1.73
ERYTHROCYTE [DISTWIDTH] IN BLOOD BY AUTOMATED COUNT: 14.2 % (ref 12.3–15.4)
GLOBULIN UR ELPH-MCNC: 3 GM/DL
GLUCOSE BLDC GLUCOMTR-MCNC: 170 MG/DL (ref 70–130)
GLUCOSE BLDC GLUCOMTR-MCNC: 175 MG/DL (ref 70–130)
GLUCOSE BLDC GLUCOMTR-MCNC: 221 MG/DL (ref 70–130)
GLUCOSE BLDC GLUCOMTR-MCNC: 318 MG/DL (ref 70–130)
GLUCOSE SERPL-MCNC: 181 MG/DL (ref 65–99)
HCT VFR BLD AUTO: 47.4 % (ref 37.5–51)
HGB BLD-MCNC: 15.3 G/DL (ref 13–17.7)
MCH RBC QN AUTO: 29.9 PG (ref 26.6–33)
MCHC RBC AUTO-ENTMCNC: 32.3 G/DL (ref 31.5–35.7)
MCV RBC AUTO: 92.6 FL (ref 79–97)
PLATELET # BLD AUTO: 247 10*3/MM3 (ref 140–450)
PMV BLD AUTO: 10.8 FL (ref 6–12)
POTASSIUM SERPL-SCNC: 3.8 MMOL/L (ref 3.5–5.2)
PROT SERPL-MCNC: 7.1 G/DL (ref 6–8.5)
RBC # BLD AUTO: 5.12 10*6/MM3 (ref 4.14–5.8)
SODIUM SERPL-SCNC: 143 MMOL/L (ref 136–145)
WBC NRBC COR # BLD AUTO: 6.52 10*3/MM3 (ref 3.4–10.8)

## 2025-03-31 PROCEDURE — 92921: CPT | Performed by: INTERNAL MEDICINE

## 2025-03-31 PROCEDURE — 63710000001 INSULIN LISPRO (HUMAN) PER 5 UNITS: Performed by: INTERNAL MEDICINE

## 2025-03-31 PROCEDURE — C1887 CATHETER, GUIDING: HCPCS | Performed by: INTERNAL MEDICINE

## 2025-03-31 PROCEDURE — C1725 CATH, TRANSLUMIN NON-LASER: HCPCS | Performed by: INTERNAL MEDICINE

## 2025-03-31 PROCEDURE — 84132 ASSAY OF SERUM POTASSIUM: CPT | Performed by: HOSPITALIST

## 2025-03-31 PROCEDURE — 25010000002 LIDOCAINE PF 1% 1 % SOLUTION: Performed by: INTERNAL MEDICINE

## 2025-03-31 PROCEDURE — 25510000001 IOPAMIDOL PER 1 ML: Performed by: INTERNAL MEDICINE

## 2025-03-31 PROCEDURE — 63710000001 INSULIN LISPRO (HUMAN) PER 5 UNITS: Performed by: HOSPITALIST

## 2025-03-31 PROCEDURE — 027135Z DILATION OF CORONARY ARTERY, TWO ARTERIES WITH TWO DRUG-ELUTING INTRALUMINAL DEVICES, PERCUTANEOUS APPROACH: ICD-10-PCS | Performed by: INTERNAL MEDICINE

## 2025-03-31 PROCEDURE — C1874 STENT, COATED/COV W/DEL SYS: HCPCS | Performed by: INTERNAL MEDICINE

## 2025-03-31 PROCEDURE — 82948 REAGENT STRIP/BLOOD GLUCOSE: CPT

## 2025-03-31 PROCEDURE — B2111ZZ FLUOROSCOPY OF MULTIPLE CORONARY ARTERIES USING LOW OSMOLAR CONTRAST: ICD-10-PCS | Performed by: INTERNAL MEDICINE

## 2025-03-31 PROCEDURE — 99232 SBSQ HOSP IP/OBS MODERATE 35: CPT | Performed by: INTERNAL MEDICINE

## 2025-03-31 PROCEDURE — 25010000002 AMPICILLIN-SULBACTAM PER 1.5 G: Performed by: NURSE PRACTITIONER

## 2025-03-31 PROCEDURE — 25010000002 FUROSEMIDE PER 20 MG: Performed by: NURSE PRACTITIONER

## 2025-03-31 PROCEDURE — 25810000003 SODIUM CHLORIDE 0.9 % SOLUTION: Performed by: INTERNAL MEDICINE

## 2025-03-31 PROCEDURE — C1769 GUIDE WIRE: HCPCS | Performed by: INTERNAL MEDICINE

## 2025-03-31 PROCEDURE — 85027 COMPLETE CBC AUTOMATED: CPT | Performed by: HOSPITALIST

## 2025-03-31 PROCEDURE — 4A023N7 MEASUREMENT OF CARDIAC SAMPLING AND PRESSURE, LEFT HEART, PERCUTANEOUS APPROACH: ICD-10-PCS | Performed by: INTERNAL MEDICINE

## 2025-03-31 PROCEDURE — 99232 SBSQ HOSP IP/OBS MODERATE 35: CPT | Performed by: HOSPITALIST

## 2025-03-31 PROCEDURE — C1894 INTRO/SHEATH, NON-LASER: HCPCS | Performed by: INTERNAL MEDICINE

## 2025-03-31 PROCEDURE — 93454 CORONARY ARTERY ANGIO S&I: CPT | Performed by: INTERNAL MEDICINE

## 2025-03-31 PROCEDURE — 71045 X-RAY EXAM CHEST 1 VIEW: CPT

## 2025-03-31 PROCEDURE — 25010000002 HEPARIN (PORCINE) PER 1000 UNITS: Performed by: INTERNAL MEDICINE

## 2025-03-31 PROCEDURE — 25010000002 MIDAZOLAM PER 1 MG: Performed by: INTERNAL MEDICINE

## 2025-03-31 PROCEDURE — 80053 COMPREHEN METABOLIC PANEL: CPT | Performed by: HOSPITALIST

## 2025-03-31 PROCEDURE — 94664 DEMO&/EVAL PT USE INHALER: CPT

## 2025-03-31 PROCEDURE — 97530 THERAPEUTIC ACTIVITIES: CPT

## 2025-03-31 PROCEDURE — 94761 N-INVAS EAR/PLS OXIMETRY MLT: CPT

## 2025-03-31 PROCEDURE — 25010000002 NICARDIPINE 2.5 MG/ML SOLUTION: Performed by: INTERNAL MEDICINE

## 2025-03-31 PROCEDURE — 94799 UNLISTED PULMONARY SVC/PX: CPT

## 2025-03-31 PROCEDURE — B2151ZZ FLUOROSCOPY OF LEFT HEART USING LOW OSMOLAR CONTRAST: ICD-10-PCS | Performed by: INTERNAL MEDICINE

## 2025-03-31 PROCEDURE — 92928 PRQ TCAT PLMT NTRAC ST 1 LES: CPT | Performed by: INTERNAL MEDICINE

## 2025-03-31 PROCEDURE — 92921 PR PRQ TRLUML CORONARY ANGIOPLASTY ADDL BRANCH: CPT | Performed by: INTERNAL MEDICINE

## 2025-03-31 PROCEDURE — 25010000002 AMPICILLIN-SULBACTAM PER 1.5 G: Performed by: INTERNAL MEDICINE

## 2025-03-31 PROCEDURE — 97161 PT EVAL LOW COMPLEX 20 MIN: CPT

## 2025-03-31 PROCEDURE — 63710000001 INSULIN LISPRO (HUMAN) PER 5 UNITS: Performed by: NURSE PRACTITIONER

## 2025-03-31 PROCEDURE — 63710000001 INSULIN GLARGINE PER 5 UNITS: Performed by: NURSE PRACTITIONER

## 2025-03-31 PROCEDURE — 25010000002 BIVALIRUDIN TRIFLUOROACETATE 250 MG RECONSTITUTED SOLUTION 1 EACH VIAL: Performed by: INTERNAL MEDICINE

## 2025-03-31 PROCEDURE — C9600 PERC DRUG-EL COR STENT SING: HCPCS | Performed by: INTERNAL MEDICINE

## 2025-03-31 PROCEDURE — 97165 OT EVAL LOW COMPLEX 30 MIN: CPT

## 2025-03-31 PROCEDURE — 25010000002 HEPARIN (PORCINE) PER 1000 UNITS: Performed by: NURSE PRACTITIONER

## 2025-03-31 DEVICE — XIENCE SKYPOINT™ EVEROLIMUS ELUTING CORONARY STENT SYSTEM 2.50 MM X 12 MM / RAPID-EXCHANGE
Type: IMPLANTABLE DEVICE | Status: FUNCTIONAL
Brand: XIENCE SKYPOINT™

## 2025-03-31 DEVICE — XIENCE SKYPOINT™ EVEROLIMUS ELUTING CORONARY STENT SYSTEM 2.25 MM X 28 MM / RAPID-EXCHANGE
Type: IMPLANTABLE DEVICE | Status: FUNCTIONAL
Brand: XIENCE SKYPOINT™

## 2025-03-31 RX ORDER — ACETAMINOPHEN 325 MG/1
650 TABLET ORAL EVERY 4 HOURS PRN
Status: DISCONTINUED | OUTPATIENT
Start: 2025-03-31 | End: 2025-03-31

## 2025-03-31 RX ORDER — CLOPIDOGREL BISULFATE 75 MG/1
TABLET ORAL
Status: DISCONTINUED | OUTPATIENT
Start: 2025-03-31 | End: 2025-03-31 | Stop reason: HOSPADM

## 2025-03-31 RX ORDER — MIDAZOLAM HYDROCHLORIDE 1 MG/ML
INJECTION, SOLUTION INTRAMUSCULAR; INTRAVENOUS
Status: DISCONTINUED | OUTPATIENT
Start: 2025-03-31 | End: 2025-03-31 | Stop reason: HOSPADM

## 2025-03-31 RX ORDER — SODIUM CHLORIDE 9 MG/ML
100 INJECTION, SOLUTION INTRAVENOUS CONTINUOUS
Status: ACTIVE | OUTPATIENT
Start: 2025-03-31 | End: 2025-03-31

## 2025-03-31 RX ORDER — LIDOCAINE HYDROCHLORIDE 10 MG/ML
INJECTION, SOLUTION EPIDURAL; INFILTRATION; INTRACAUDAL; PERINEURAL
Status: DISCONTINUED | OUTPATIENT
Start: 2025-03-31 | End: 2025-03-31 | Stop reason: HOSPADM

## 2025-03-31 RX ORDER — IPRATROPIUM BROMIDE AND ALBUTEROL SULFATE 2.5; .5 MG/3ML; MG/3ML
3 SOLUTION RESPIRATORY (INHALATION) EVERY 4 HOURS PRN
Status: DISCONTINUED | OUTPATIENT
Start: 2025-03-31 | End: 2025-04-03 | Stop reason: HOSPADM

## 2025-03-31 RX ORDER — IOPAMIDOL 755 MG/ML
INJECTION, SOLUTION INTRAVASCULAR
Status: DISCONTINUED | OUTPATIENT
Start: 2025-03-31 | End: 2025-03-31 | Stop reason: HOSPADM

## 2025-03-31 RX ORDER — POTASSIUM CHLORIDE 1500 MG/1
20 TABLET, EXTENDED RELEASE ORAL ONCE
Status: COMPLETED | OUTPATIENT
Start: 2025-03-31 | End: 2025-03-31

## 2025-03-31 RX ORDER — CLOPIDOGREL BISULFATE 75 MG/1
75 TABLET ORAL DAILY
Status: DISCONTINUED | OUTPATIENT
Start: 2025-04-01 | End: 2025-04-03 | Stop reason: HOSPADM

## 2025-03-31 RX ORDER — HEPARIN SODIUM 1000 [USP'U]/ML
INJECTION, SOLUTION INTRAVENOUS; SUBCUTANEOUS
Status: DISCONTINUED | OUTPATIENT
Start: 2025-03-31 | End: 2025-03-31 | Stop reason: HOSPADM

## 2025-03-31 RX ADMIN — SENNOSIDES AND DOCUSATE SODIUM 2 TABLET: 50; 8.6 TABLET ORAL at 21:54

## 2025-03-31 RX ADMIN — PANTOPRAZOLE SODIUM 40 MG: 40 TABLET, DELAYED RELEASE ORAL at 06:00

## 2025-03-31 RX ADMIN — Medication 10 ML: at 21:55

## 2025-03-31 RX ADMIN — POLYETHYLENE GLYCOL 3350 17 G: 17 POWDER, FOR SOLUTION ORAL at 22:29

## 2025-03-31 RX ADMIN — GABAPENTIN 600 MG: 300 CAPSULE ORAL at 06:00

## 2025-03-31 RX ADMIN — INSULIN LISPRO 2 UNITS: 100 INJECTION, SOLUTION INTRAVENOUS; SUBCUTANEOUS at 10:00

## 2025-03-31 RX ADMIN — INSULIN LISPRO 2 UNITS: 100 INJECTION, SOLUTION INTRAVENOUS; SUBCUTANEOUS at 17:55

## 2025-03-31 RX ADMIN — INSULIN LISPRO 2 UNITS: 100 INJECTION, SOLUTION INTRAVENOUS; SUBCUTANEOUS at 10:39

## 2025-03-31 RX ADMIN — INSULIN GLARGINE 10 UNITS: 100 INJECTION, SOLUTION SUBCUTANEOUS at 10:00

## 2025-03-31 RX ADMIN — INSULIN LISPRO 5 UNITS: 100 INJECTION, SOLUTION INTRAVENOUS; SUBCUTANEOUS at 21:55

## 2025-03-31 RX ADMIN — ROSUVASTATIN CALCIUM 20 MG: 20 TABLET, FILM COATED ORAL at 10:00

## 2025-03-31 RX ADMIN — AVOBENZONE, HOMOSALATE, OCTISALATE, OCTOCRYLENE, AND OXYBENZONE 1 PACKET: 29.4; 147; 49; 25.4; 58.8 LOTION TOPICAL at 10:00

## 2025-03-31 RX ADMIN — ASPIRIN 162 MG: 325 TABLET ORAL at 10:00

## 2025-03-31 RX ADMIN — Medication 4 ML: at 07:16

## 2025-03-31 RX ADMIN — DICLOFENAC SODIUM 2 G: 10 GEL TOPICAL at 12:38

## 2025-03-31 RX ADMIN — AMPICILLIN SODIUM AND SULBACTAM SODIUM 3 G: 2; 1 INJECTION, POWDER, FOR SOLUTION INTRAMUSCULAR; INTRAVENOUS at 17:54

## 2025-03-31 RX ADMIN — Medication 10 ML: at 10:39

## 2025-03-31 RX ADMIN — INSULIN LISPRO 3 UNITS: 100 INJECTION, SOLUTION INTRAVENOUS; SUBCUTANEOUS at 17:55

## 2025-03-31 RX ADMIN — DOXYCYCLINE 100 MG: 100 INJECTION, POWDER, LYOPHILIZED, FOR SOLUTION INTRAVENOUS at 00:41

## 2025-03-31 RX ADMIN — IPRATROPIUM BROMIDE AND ALBUTEROL SULFATE 3 ML: 2.5; .5 SOLUTION RESPIRATORY (INHALATION) at 07:16

## 2025-03-31 RX ADMIN — AMPICILLIN SODIUM AND SULBACTAM SODIUM 3 G: 2; 1 INJECTION, POWDER, FOR SOLUTION INTRAMUSCULAR; INTRAVENOUS at 00:04

## 2025-03-31 RX ADMIN — POTASSIUM CHLORIDE 20 MEQ: 1500 TABLET, EXTENDED RELEASE ORAL at 17:55

## 2025-03-31 RX ADMIN — AMPICILLIN SODIUM AND SULBACTAM SODIUM 3 G: 2; 1 INJECTION, POWDER, FOR SOLUTION INTRAMUSCULAR; INTRAVENOUS at 06:00

## 2025-03-31 RX ADMIN — GUAIFENESIN 600 MG: 600 TABLET ORAL at 12:36

## 2025-03-31 RX ADMIN — AMPICILLIN SODIUM AND SULBACTAM SODIUM 3 G: 2; 1 INJECTION, POWDER, FOR SOLUTION INTRAMUSCULAR; INTRAVENOUS at 12:36

## 2025-03-31 RX ADMIN — SODIUM CHLORIDE 100 ML/HR: 9 INJECTION, SOLUTION INTRAVENOUS at 17:29

## 2025-03-31 RX ADMIN — IPRATROPIUM BROMIDE AND ALBUTEROL SULFATE 3 ML: 2.5; .5 SOLUTION RESPIRATORY (INHALATION) at 02:43

## 2025-03-31 RX ADMIN — GUAIFENESIN 600 MG: 600 TABLET ORAL at 21:54

## 2025-03-31 RX ADMIN — GABAPENTIN 600 MG: 300 CAPSULE ORAL at 21:54

## 2025-03-31 RX ADMIN — METOPROLOL TARTRATE 25 MG: 25 TABLET, FILM COATED ORAL at 21:54

## 2025-03-31 RX ADMIN — METOPROLOL TARTRATE 25 MG: 25 TABLET, FILM COATED ORAL at 10:00

## 2025-03-31 RX ADMIN — SENNOSIDES AND DOCUSATE SODIUM 2 TABLET: 50; 8.6 TABLET ORAL at 10:00

## 2025-03-31 RX ADMIN — HEPARIN SODIUM 5000 UNITS: 5000 INJECTION INTRAVENOUS; SUBCUTANEOUS at 21:54

## 2025-03-31 RX ADMIN — POLYETHYLENE GLYCOL 3350 17 G: 17 POWDER, FOR SOLUTION ORAL at 10:00

## 2025-03-31 RX ADMIN — FUROSEMIDE 40 MG: 10 INJECTION, SOLUTION INTRAMUSCULAR; INTRAVENOUS at 10:00

## 2025-03-31 RX ADMIN — EMPAGLIFLOZIN 25 MG: 25 TABLET, FILM COATED ORAL at 10:00

## 2025-03-31 RX ADMIN — AMPICILLIN SODIUM AND SULBACTAM SODIUM 3 G: 2; 1 INJECTION, POWDER, FOR SOLUTION INTRAMUSCULAR; INTRAVENOUS at 23:40

## 2025-03-31 RX ADMIN — HEPARIN SODIUM 5000 UNITS: 5000 INJECTION INTRAVENOUS; SUBCUTANEOUS at 06:04

## 2025-03-31 RX ADMIN — INSULIN LISPRO 2 UNITS: 100 INJECTION, SOLUTION INTRAVENOUS; SUBCUTANEOUS at 12:36

## 2025-03-31 RX ADMIN — DOXYCYCLINE 100 MG: 100 INJECTION, POWDER, LYOPHILIZED, FOR SOLUTION INTRAVENOUS at 23:40

## 2025-03-31 NOTE — THERAPY EVALUATION
Patient Name: Soctt Benz  : 1955    MRN: [id number]                              Today's Date: 3/31/2025       Admit Date: 3/29/2025    Visit Dx: No diagnosis found.  Patient Active Problem List   Diagnosis    Diabetic polyneuropathy associated with type 2 diabetes mellitus    Arthritis    Stage 2 chronic kidney disease    CHF exacerbation    History of CVA (cerebrovascular accident)    Type 2 diabetes mellitus with hyperglycemia, with long-term current use of insulin    Hypoglycemia    Diabetic retinopathy    Essential hypertension    Hyperlipidemia    Osteoarthritis of knee    PAD (peripheral artery disease)    Dyspnea on exertion    Unilateral AKA, right    Orthopnea    RLQ abdominal pain    Chronic idiopathic constipation    Dysuria    Pneumonia of both lower lobes due to infectious organism     Past Medical History:   Diagnosis Date    Arthritis     with hands    CHF (congestive heart failure)     CKD (chronic kidney disease)     CVA (cerebral vascular accident)     Diabetes     H/O lower limb amputation     Hypertension     Stroke 11/10/2019     Past Surgical History:   Procedure Laterality Date    ABOVE KNEE LEG AMPUTATION      AMPUTATION DIGIT Left 2019    Procedure: excision left 4th metatarsal;  Surgeon: Shannan Kraft MD;  Location:  [facility];  Service: Orthopedics    APPENDECTOMY      INCISION AND DRAINAGE LEG Left 2019    Procedure: I&D left foot wound;  Surgeon: Shannan Kraft MD;  Location:  [facility];  Service: Orthopedics      General Information       Row Name 25 1629          Physical Therapy Time and Intention    Document Type evaluation  -CD     Mode of Treatment physical therapy  -CD       Row Name 25 1629          General Information    Patient Profile Reviewed yes  -CD     Prior Level of Function independent:;all household mobility;transfer;w/c or scooter;ADL's  pivot T/Fs to W/C, Has prosthetic but hasn't been able to use for 2 weeks due to not fitting  properly.  -CD     Existing Precautions/Restrictions --  R AKA  -CD     Barriers to Rehab medically complex;previous functional deficit;physical barrier  -CD       Row Name 03/31/25 1629          Living Environment    Current Living Arrangements home  -CD     People in Home spouse  Spouse works and pt is home alone during the day.  -CD       Row Name 03/31/25 1629          Home Main Entrance    Number of Stairs, Main Entrance --  HAS A RAMP  -CD       Row Name 03/31/25 1629          Stairs Within Home, Primary    Number of Stairs, Within Home, Primary none  -CD       Row Name 03/31/25 1629          Cognition    Orientation Status (Cognition) oriented x 3  -CD       Row Name 03/31/25 1629          Safety Issues/Impairments Affecting Functional Mobility    Safety Issues Affecting Function (Mobility) awareness of need for assistance;safety precautions follow-through/compliance;safety precaution awareness;insight into deficits/self-awareness;sequencing abilities  -CD     Impairments Affecting Function (Mobility) balance;endurance/activity tolerance;postural/trunk control;strength  -CD     Comment, Safety Issues/Impairments (Mobility) POOR SAFETY AWARENESS WITH STS TRANSFERS USING R WALKER. FATIGUES QUICKLY  -CD               User Key  (r) = Recorded By, (t) = Taken By, (c) = Cosigned By      Initials Name Provider Type    CD Jaymie Brown, PT Physical Therapist                   Mobility       Row Name 03/31/25 1631          Bed Mobility    Supine-Sit [place] (Bed Mobility) minimum assist (75% patient effort);verbal cues  -CD     Assistive Device (Bed Mobility) bed rails;head of bed elevated  -CD     Comment, (Bed Mobility) CUES TO SEQUENCE PROPERLY. INCREASED TIME AND EFFORT.  -CD       Row Name 03/31/25 1631          Transfers    Comment, (Transfers) SQUAT PIVOT BED TO RECLINER AND STS FROM RECLINER TO WALKER X 2 REPS. CUES FOR HAND PLACEMENT WITH STS TRANSFER AND UPRIGHT POSTURE ONCE STANDING.  -CD       Row  Name 03/31/25 1631          Bed-Chair Transfer    Bed-Chair [place] (Transfers) minimum assist (75% patient effort);verbal cues  -CD     Assistive Device (Bed-Chair Transfers) --  NO A.D. USED ARM RESTS OF RECLINER FOR SUPPORT DURING PIVOT TRANSFER TO THE L.  -CD       Row Name 03/31/25 1631          Sit-Stand Transfer    Sit-Stand [place] (Transfers) minimum assist (75% patient effort);verbal cues  -CD     Assistive Device (Sit-Stand Transfers) walker, front-wheeled  -CD       Row Name 03/31/25 1631          Gait/Stairs (Locomotion)    [place] Level (Gait) not tested  -CD     Comment, (Gait/Stairs) PT NON-AMBULATORY X 2 WEEKS DUE TO ILL FITTING PROSTHESIS. PT HAS NOT BEEN ABLE TO HOP STEP ON L LE WITH R WALKER DUE TO FATIGUE, WEAKNESS AND SOA.  -CD               User Key  (r) = Recorded By, (t) = Taken By, (c) = Cosigned By      Initials Name Provider Type    CD Jaymie Brown, PT Physical Therapist                   Obj/Interventions       Row Name 03/31/25 1635          Range of Motion Comprehensive    General Range of Motion lower extremity range of motion deficits identified  -CD     Comment, General Range of Motion LIMITED L ANKLE ROM AT BASELINE FROM PRIOR CVA. L ANKLE IS INVERTED AND WITH LIMITED DF. OTHERWISE ROM WFL'S  -CD       Row Name 03/31/25 1635          Strength Comprehensive (MMT)    General Manual Muscle Testing (MMT) Assessment lower extremity strength deficits identified  -CD     Comment, General Manual Muscle Testing (MMT) Assessment GROSSLY 3+ TO 4/5 B LE'S  -CD       Row Name 03/31/25 1635          Motor Skills    Motor Skills functional endurance  -CD     Functional Endurance PT FATIGUES QUICKLY WITH TRANSFERS BUT O2 SATS WERE STABLE ON RA.  -CD       Row Name 03/31/25 1635          Balance    Balance Assessment sitting static balance;sitting dynamic balance;standing static balance;standing dynamic balance  -CD     Static Sitting Balance contact guard;verbal cues  -CD      Dynamic Sitting Balance contact guard;verbal cues  -CD     Position, Sitting Balance unsupported;sitting in chair;sitting edge of bed  -CD     Static Standing Balance minimal assist;verbal cues  -CD     Dynamic Standing Balance minimal assist;verbal cues  -CD     Position/Device Used, Standing Balance supported;walker, rolling  -CD     Balance Interventions sitting;standing;sit to stand;supported;static;dynamic;weight shifting activity  -CD     Comment, Balance WORKED ON STANDING UPRIGHT AT WALKER. PT IS UNSTEADY WITH STS TRANSITION AND NEEDS CUES FOR SAFETY.  -CD               User Key  (r) = Recorded By, (t) = Taken By, (c) = Cosigned By      Initials Name Provider Type    CD Jaymie Brown, PT Physical Therapist                   Goals/Plan       Row Name 03/31/25 3429          Bed Mobility Goal 1 (PT)    Activity/Assistive Device (Bed Mobility Goal 1, PT) sit to supine/supine to sit  -CD     [place] Level/Cues Needed (Bed Mobility Goal 1, PT) independent  -CD     Time Frame (Bed Mobility Goal 1, PT) short term goal (STG);1 week  -CD       Row Name 03/31/25 1658          Transfer Goal 1 (PT)    Activity/Assistive Device (Transfer Goal 1, PT) sit-to-stand/stand-to-sit;bed-to-chair/chair-to-bed  -CD     [place] Level/Cues Needed (Transfer Goal 1, PT) contact guard required  -CD     Time Frame (Transfer Goal 1, PT) long term goal (LTG);2 weeks  -CD       Row Name 03/31/25 1652          Gait Training Goal 1 (PT)    Activity/Assistive Device (Gait Training Goal 1, PT) gait (walking locomotion);walker, rolling  -CD     [place] Level (Gait Training Goal 1, PT) minimum assist (75% or more patient effort)  -CD     Distance (Gait Training Goal 1, PT) 20  -CD     Time Frame (Gait Training Goal 1, PT) long term goal (LTG);2 weeks  -CD       Row Name 03/31/25 1652          Therapy Assessment/Plan (PT)    Planned Therapy Interventions (PT) balance training;bed mobility training;gait training;home exercise  program;strengthening;transfer training;postural re-education;patient/family education;stretching  -CD               User Key  (r) = Recorded By, (t) = Taken By, (c) = Cosigned By      Initials Name Provider Type    CD Jaymie Brown, PT Physical Therapist                   Clinical Impression       Row Name 03/31/25 1651          Pain    Pretreatment Pain Rating 0/10 - no pain  -CD     Posttreatment Pain Rating 0/10 - no pain  -CD       Row Name 03/31/25 1651          Plan of Care Review    Plan of Care Reviewed With patient  -CD     Outcome Evaluation PT PRESENTS WITH EVOLVING SYMPTOMS TO INCLUDE IMPAIRED BALANCE, GENERALIZED WEAKNESS, DECREASED ENDURANCE AND DECLINE IN FUNCTIONAL MOBILITY.  PT IS S/P REMOTE R AKA AND HAS NON-AMBULATORY X 2 WEEKS DUE TO ILL FITTING PROSTHESIS. PT HAS NOT BEEN ABLE TO HOP STEP ON L LE WITH R WALKER DUE TO FATIGUE, WEAKNESS AND SOA. RECOMMEND IRF AT D/C FOR BEST OUTCOME AND PROGRESSION BACK TO WEARING PROSTHETIC LIMB AS ABLE.  -CD       Row Name 03/31/25 1651          Therapy Assessment/Plan (PT)    Patient/Family Therapy Goals Statement (PT) TO RETURN TO PLOF AND HOME. PT IS AGREEABLE TO INPT REHAB.  -CD     Rehab Potential (PT) good  -CD     Criteria for Skilled Interventions Met (PT) yes;meets criteria;skilled treatment is necessary  -CD     Therapy Frequency (PT) daily  -CD     Predicted Duration of Therapy Intervention (PT) 2 WEEKS  -CD       Row Name 03/31/25 1651          Vital Signs    Pre Systolic BP Rehab 123  -CD     Pre Treatment Diastolic BP 82  -CD     Post Systolic BP Rehab 133  -CD     Post Treatment Diastolic BP 57  -CD     Posttreatment Heart Rate (beats/min) 82  -CD     Pre SpO2 (%) 98  -CD     O2 Delivery Pre Treatment room air  -CD     O2 Delivery Intra Treatment room air  -CD     Post SpO2 (%) 96  -CD     O2 Delivery Post Treatment room air  -CD     Pre Patient Position Supine  -CD     Intra Patient Position Standing  -CD     Post Patient Position Sitting  -CD        Row Name 03/31/25 1651          Positioning and Restraints    Pre-Treatment Position in bed  -CD     Post Treatment Position chair  -CD     In Chair notified nsg;reclined;call light within reach;encouraged to call for assist;exit alarm on;legs elevated  -CD               User Key  (r) = Recorded By, (t) = Taken By, (c) = Cosigned By      Initials Name Provider Type    CD Jaymie Brown, PT Physical Therapist                   Outcome Measures       Row Name 03/31/25 1656          How much help from another person do you currently need...    Turning from your back to your side while in flat bed without using bedrails? 3  -CD     Moving from lying on back to sitting on the side of a flat bed without bedrails? 3  -CD     Moving to and from a bed to a chair (including a wheelchair)? 3  -CD     Standing up from a chair using your arms (e.g., wheelchair, bedside chair)? 3  -CD     Climbing 3-5 steps with a railing? 1  -CD     To walk in hospital room? 1  -CD     AM-PAC 6 Clicks Score (PT) 14  -CD       Row Name 03/31/25 1535          Functional Assessment    Outcome Measure Options AM-PAC 6 Clicks Daily Activity (OT)  -               User Key  (r) = Recorded By, (t) = Taken By, (c) = Cosigned By      Initials Name Provider Type    CD Jaymie Brown, PT Physical Therapist    Yue Lin OT Occupational Therapist                                 Physical Therapy Education       Title: PT OT SLP Therapies (In Progress)       Topic: Physical Therapy (Done)       Point: Mobility training (Done)       Learning Progress Summary            Patient Acceptance, E, VU,NR by CD at 3/31/2025 1656    Comment: BENEFITS OF OOB ACTIVITY, SAFETY WITH MOBILITY, PROGRESSION OF POC, D/C PLANNING, TRANSFER TRAINING WITH R WALKER.                      Point: Home exercise program (Done)       Learning Progress Summary            Patient Acceptance, E, VU,NR by CD at 3/31/2025 1656    Comment: BENEFITS OF OOB ACTIVITY, SAFETY WITH  MOBILITY, PROGRESSION OF POC, D/C PLANNING, TRANSFER TRAINING WITH R WALKER.                      Point: Body mechanics (Done)       Learning Progress Summary            Patient Acceptance, E, VU,NR by CD at 3/31/2025 1656    Comment: BENEFITS OF OOB ACTIVITY, SAFETY WITH MOBILITY, PROGRESSION OF POC, D/C PLANNING, TRANSFER TRAINING WITH R WALKER.                      Point: Precautions (Done)       Learning Progress Summary            Patient Acceptance, E, VU,NR by CD at 3/31/2025 1656    Comment: BENEFITS OF OOB ACTIVITY, SAFETY WITH MOBILITY, PROGRESSION OF POC, D/C PLANNING, TRANSFER TRAINING WITH R WALKER.                                      User Key       Initials Effective Dates Name Provider Type Discipline    CD 02/03/23 -  Jaymie Brown PT Physical Therapist PT                  PT Recommendation and Plan  Planned Therapy Interventions (PT): balance training, bed mobility training, gait training, home exercise program, strengthening, transfer training, postural re-education, patient/family education, stretching  Outcome Evaluation: PT PRESENTS WITH EVOLVING SYMPTOMS TO INCLUDE IMPAIRED BALANCE, GENERALIZED WEAKNESS, DECREASED ENDURANCE AND DECLINE IN FUNCTIONAL MOBILITY.  PT IS S/P REMOTE R AKA AND HAS NON-AMBULATORY X 2 WEEKS DUE TO ILL FITTING PROSTHESIS. PT HAS NOT BEEN ABLE TO HOP STEP ON L LE WITH R WALKER DUE TO FATIGUE, WEAKNESS AND SOA. RECOMMEND IRF AT D/C FOR BEST OUTCOME AND PROGRESSION BACK TO WEARING PROSTHETIC LIMB AS ABLE.     Time Calculation:   PT Evaluation Complexity  History, PT Evaluation Complexity: 3 or more personal factors and/or comorbidities  Examination of Body Systems (PT Eval Complexity): total of 4 or more elements  Clinical Presentation (PT Evaluation Complexity): evolving  Clinical Decision Making (PT Evaluation Complexity): low complexity  Overall Complexity (PT Evaluation Complexity): low complexity     PT Charges       Row Name 03/31/25 7014             Time  Calculation    Start Time 1353  -CD      PT Received On 03/31/25  -CD      PT Goal Re-Cert Due Date 04/10/25  -CD         Timed Charges    36591 - PT Therapeutic Activity Minutes 10  -CD         Untimed Charges    PT Eval/Re-eval Minutes 54  -CD         Total Minutes    Timed Charges Total Minutes 10  -CD      Untimed Charges Total Minutes 54  -CD       Total Minutes 64  -CD                User Key  (r) = Recorded By, (t) = Taken By, (c) = Cosigned By      Initials Name Provider Type    CD Jaymie Brown, PT Physical Therapist                  Therapy Charges for Today       Code Description Service Date Service Provider Modifiers Qty    77148366337  PT THERAPEUTIC ACT EA 15 MIN 3/31/2025 Jaymie Brown, PT GP 1    70884752426 HC PT EVAL LOW COMPLEXITY 4 3/31/2025 Jaymie Brown, PT GP 1            PT G-Codes  Outcome Measure Options: AM-PAC 6 Clicks Daily Activity (OT)  AM-PAC 6 Clicks Score (PT): 14  AM-PAC 6 Clicks Score (OT): 16  PT Discharge Summary  Anticipated Discharge Disposition (PT): inpatient rehabilitation facility    Jaymie Brown, PT  3/31/2025

## 2025-03-31 NOTE — PLAN OF CARE
Goal Outcome Evaluation:  Plan of Care Reviewed With: patient           Outcome Evaluation: PT PRESENTS WITH EVOLVING SYMPTOMS TO INCLUDE IMPAIRED BALANCE, GENERALIZED WEAKNESS, DECREASED ENDURANCE AND DECLINE IN FUNCTIONAL MOBILITY.  PT IS S/P REMOTE R AKA AND HAS NON-AMBULATORY X 2 WEEKS DUE TO ILL FITTING PROSTHESIS. PT HAS NOT BEEN ABLE TO HOP STEP ON L LE WITH R WALKER DUE TO FATIGUE, WEAKNESS AND SOA. RECOMMEND IRF AT D/C FOR BEST OUTCOME AND PROGRESSION BACK TO WEARING PROSTHETIC LIMB AS ABLE.    Anticipated Discharge Disposition (PT): inpatient rehabilitation facility

## 2025-03-31 NOTE — PLAN OF CARE
Problem: Adult Inpatient Plan of Care  Goal: Plan of Care Review  Outcome: Progressing  Goal: Patient-Specific Goal (Individualized)  Outcome: Progressing  Goal: Absence of Hospital-Acquired Illness or Injury  Outcome: Progressing  Intervention: Identify and Manage Fall Risk  Recent Flowsheet Documentation  Taken 3/31/2025 0000 by Alejandro Aj RN  Safety Promotion/Fall Prevention:   activity supervised   safety round/check completed  Taken 3/30/2025 2200 by Alejandro Aj RN  Safety Promotion/Fall Prevention:   activity supervised   safety round/check completed  Taken 3/30/2025 2000 by Alejandro Aj RN  Safety Promotion/Fall Prevention:   activity supervised   safety round/check completed  Intervention: Prevent Skin Injury  Recent Flowsheet Documentation  Taken 3/31/2025 0000 by Alejandro Aj RN  Body Position:   side-lying   left   position changed independently  Skin Protection:   drying agents applied   incontinence pads utilized   skin sealant/moisture barrier applied   transparent dressing maintained   silicone foam dressing in place  Taken 3/30/2025 2200 by Alejandro Aj RN  Body Position:   position changed independently   supine  Skin Protection:   drying agents applied   incontinence pads utilized   skin sealant/moisture barrier applied   transparent dressing maintained   silicone foam dressing in place  Taken 3/30/2025 2000 by Alejandro Aj RN  Skin Protection:   drying agents applied   incontinence pads utilized   skin sealant/moisture barrier applied   transparent dressing maintained   silicone foam dressing in place  Intervention: Prevent and Manage VTE (Venous Thromboembolism) Risk  Recent Flowsheet Documentation  Taken 3/31/2025 0000 by Alejandro Aj RN  VTE Prevention/Management:   SCDs (sequential compression devices) off   patient refused intervention  Taken 3/30/2025 2200 by Alejandro Aj RN  VTE Prevention/Management:   SCDs (sequential compression devices) off    patient refused intervention  Taken 3/30/2025 2000 by Alejandro Aj RN  VTE Prevention/Management:   SCDs (sequential compression devices) off   patient refused intervention  Intervention: Prevent Infection  Recent Flowsheet Documentation  Taken 3/31/2025 0000 by Alejandro Aj RN  Infection Prevention: environmental surveillance performed  Taken 3/30/2025 2200 by Alejandro Aj RN  Infection Prevention: environmental surveillance performed  Taken 3/30/2025 2000 by Alejandro Aj RN  Infection Prevention: environmental surveillance performed  Goal: Optimal Comfort and Wellbeing  Outcome: Progressing  Intervention: Provide Person-Centered Care  Recent Flowsheet Documentation  Taken 3/31/2025 0000 by Alejandro Aj RN  Trust Relationship/Rapport:   care explained   choices provided   emotional support provided   empathic listening provided   questions answered   questions encouraged   reassurance provided   thoughts/feelings acknowledged  Taken 3/30/2025 2200 by Alejandro Aj RN  Trust Relationship/Rapport:   care explained   choices provided   emotional support provided   empathic listening provided   questions answered   questions encouraged   reassurance provided   thoughts/feelings acknowledged  Taken 3/30/2025 2000 by Alejandro Aj RN  Trust Relationship/Rapport:   care explained   choices provided   emotional support provided   empathic listening provided   questions answered   questions encouraged   reassurance provided   thoughts/feelings acknowledged  Goal: Readiness for Transition of Care  Outcome: Progressing   Goal Outcome Evaluation:

## 2025-03-31 NOTE — PAYOR COMM NOTE
"Salena Valdez (69 y.o. Male)       Date of Birth   1955    Social Security Number       Address   [address] [address]    Home Phone   [phone]    MRN   [id number]       Druze   None    Marital Status                               Admission Date   3/29/2025    Admission Type   Urgent    Admitting Provider   Imelda Ambrocio MD    Attending Provider   Imelda Ambrocio MD    Department, Room/Bed   [facility] 3E, S344/1       Discharge Date       Discharge Disposition       Discharge Destination                                 Attending Provider: Imelda Ambrocio MD    Allergies: No Known Allergies    Isolation: None   Infection: None   Code Status: CPR    Ht: 172 cm (67.72\")   Wt: 92 kg (202 lb 13.2 oz)    Admission Cmt: None   Principal Problem: CHF exacerbation [I50.9]                   Active Insurance as of 3/29/2025       Primary Coverage       Payor Plan Insurance Group Employer/Plan Group    ANTHEM BLUE CROSS ANTHEM BLUE CROSS BLUE SHIELD PPO [id number]       Payor Plan Address Payor Plan Phone Number Payor Plan Fax Number Effective Dates    [id number] [phone]  1/1/2024 - None Entered    [facility] [id number]         Subscriber Name Subscriber Birth Date Member ID       Mikki Matthews [birth year]18/1978 [id number]               Secondary Coverage       Payor Plan Insurance Group Employer/Plan Group    MISC COMMERCIAL MISC COMMERCIAL        Coverage Address Coverage Phone Number Coverage Fax Number Effective Dates    [address]   6/14/2022 - None Entered    [facility] [id number]         Subscriber Name Subscriber Birth Date Member ID       SALENA VALDEZ 1955 [id number]                     Emergency Contacts        (Rel.) Home Phone Work Phone Mobile Phone    MIKKI MATTHEWS (Spouse) [phone] -- [phone]             [facility]  [address] [facility] [address]  Phone:  " [phone]  Fax:  [phone]        Patient:     Scott Benz MRN:  [id number]   [address] [place] [address] :  1955  SSN:    Phone: [phone] Sex:  M      INSURANCE PAYOR PLAN GROUP # SUBSCRIBER ID   Primary:  Secondary:    ANTHZACH BLUE CROSS  MISC COMMERCIAL [id number]  [id number]    [id number]  [id number]   Admitting Diagnosis: CHF exacerbation [I50.9]  Order Date:  Mar 31, 2025        Inpatient Admission       (Order ID: [id number])     Diagnosis:         Priority:  Routine Expected Date:   Expiration Date:        Interval:   Count:    Level of Care: Telemetry [5]  Diagnosis: CHF exacerbation [901865]  Certification: I Certify That Inpatient Hospital Services Are Medically Necessary For Greater Than 2 Midnights     Specimen Type:   Specimen Source:   Specimen Taken Date:   Specimen Taken Time:                  Verbal Order Mode: Verbal with readback   Authorizing Provider: Imelda Ambrocio MD  Authorizing Provider's NPI: [id number]     Order Entered By: Tian Farmer RN 3/31/2025 10:25 AM     Electronically signed by:            History & Physical        Ashlee Rivera APRN at 25       Attestation signed by Katie Shi MD at 25 2314      I have reviewed this documentation and agree.                          [facility] Medicine Services  HISTORY AND PHYSICAL    Patient Name: Scott Benz  : 1955  MRN: [id number]  Primary Care Physician: Josh Green PA-C  Date of admission: 3/29/2025    Subjective  Subjective     Chief Complaint:  Abnormal labs / shortness of breath / orthopnea    HPI:  Scott Benz is a 69 y.o. male with PMHx of CHF, HTN, HLD, PVD s/p R AKA, PAD s/p fem/tib bypass graft, peripheral neuropathy, T2DM - insulin dependent, CVA and arthritis who presented to [facility] after being called by his PCP with abnormal labs and concern for acute CHF exacerbation.    Pt reports 3-4 days of worsening  "orthopnea (he is sleeping in his wheelchair b/c he lays down and can't breathe), increased shortness of breath, LE swelling and fatigue. Went to PCP yesterday to establish care with a [facility] provider. Has been feeling poorly since January with GI concerns, nausea, bloating, abdominal pain on the right side. Has a GI referral in August. Having issues with constipation, did have 4 small BM's yesterday after taking miralax and metaucil. Was given zofran and omeprazole but these did not help and seemed to make his symptoms worse.    Was evaluated last Fall for similar symptoms, treated with diuretics, steroids and antibiotics per his reports and felt better. He was supposed to have a stress test and repeat ECHO but was never able to do this d/t bad weather and could not get to the appointment. He was prescribed lasix 20 mg PO at the PCP office and has taken this but did not seem to help much. He is not weight himself regularly. He uses his w/c for mobility. Was previously able to tolerate prosthesis but states \"I've let myself go\" and has not been able to use the prosthesis recently.    Transferring documents reviewed: no radiology results, labs w/ troponin 39.8/151.5/153.5; WBC 7.5, H/H 14.5/44.4, platelet 242; K 4.3, creat 1.06. EKG w/ prolonged QTc 512 ms and SR PVC's w/ fusion complexes. He was given 324 mg aspirin and 40 mg IV lasix at OSH. Pt reports some improvement in his breathing since receiving IV lasix. Transfer was discussed with cardiology, Dr. Barrera who recommends further diuresis and ECHO in am. Pt will be admitted to the hospital medicine service.    Review of Systems   Constitutional:  Positive for activity change, appetite change (decreased appetite) and fatigue.   Respiratory:  Positive for chest tightness and shortness of breath. Negative for wheezing.    Cardiovascular:  Positive for leg swelling. Negative for chest pain and palpitations.   Gastrointestinal:  Positive for abdominal pain (R side), " constipation and nausea. Negative for vomiting.   Genitourinary:  Positive for difficulty urinating and dysuria.   Musculoskeletal:  Positive for gait problem.   Neurological:  Positive for weakness (generalized).   Psychiatric/Behavioral:  Positive for sleep disturbance.    All other systems reviewed and are negative.          Personal History     Past Medical History:   Diagnosis Date    Arthritis     with hands    CHF (congestive heart failure)     CKD (chronic kidney disease)     CVA (cerebral vascular accident)     Diabetes     H/O lower limb amputation     Hypertension     Stroke 11/10/2019         Past Surgical History:   Procedure Laterality Date    ABOVE KNEE LEG AMPUTATION      AMPUTATION DIGIT Left 07/23/2019    Procedure: excision left 4th metatarsal;  Surgeon: Shannan Kraft MD;  Location: [facility] OR;  Service: Orthopedics    APPENDECTOMY      INCISION AND DRAINAGE LEG Left 07/23/2019    Procedure: I&D left foot wound;  Surgeon: Shannan Kraft MD;  Location:  [facility] OR;  Service: Orthopedics       Family History:   family history includes Diabetes in his father; Stroke in his father.     Social History:  reports that he has never smoked. He has never used smokeless tobacco. He reports that he does not drink alcohol and does not use drugs.  Social History     Social History Narrative    Not on file       Medications:  Insulin Glargine (2 Unit Dial), Polyethylene Glycol 3350, aspirin, dapagliflozin Propanediol, furosemide, gabapentin, glimepiride, insulin regular, metFORMIN, metoprolol tartrate, omeprazole, ondansetron, potassium chloride, psyllium, and rosuvastatin    No Known Allergies    Objective  Objective     Vital Signs:   Temp:  [97.7 °F (36.5 °C)] 97.7 °F (36.5 °C)  Heart Rate:  [96] 96  Resp:  [20] 20  BP: (139)/(116) 139/116    Physical Exam  Constitutional:       General: He is not in acute distress.     Appearance: He is well-developed. He is ill-appearing.   HENT:      Head: Normocephalic  and atraumatic.      Nose: Nose normal.      Mouth/Throat:      Pharynx: Oropharynx is clear.   Eyes:      Extraocular Movements: Extraocular movements intact.      Conjunctiva/sclera: Conjunctivae normal.      Pupils: Pupils are equal, round, and reactive to light.   Cardiovascular:      Rate and Rhythm: Regular rhythm. Tachycardia present.      Pulses: Normal pulses.      Heart sounds: Normal heart sounds. No murmur heard.  Pulmonary:      Effort: Respiratory distress (labored respiratory effort w/ exertion and w/ conversation) present.      Breath sounds: Rales (posterior bases) present.      Comments: Room air oxygen saturation mid 90's  Abdominal:      General: Bowel sounds are normal. There is no distension.      Palpations: Abdomen is soft.      Tenderness: There is abdominal tenderness (RLQ). There is no guarding.   Musculoskeletal:         General: No swelling. Normal range of motion.      Cervical back: Normal range of motion and neck supple.   Skin:     General: Skin is warm and dry.      Capillary Refill: Capillary refill takes less than 2 seconds.      Findings: No rash.   Neurological:      Mental Status: He is alert and oriented to person, place, and time.      Cranial Nerves: No cranial nerve deficit.   Psychiatric:         Mood and Affect: Mood normal.         Behavior: Behavior normal.           Result Review:  I have personally reviewed the results from the time of this admission to 3/29/2025 22:19 EDT and agree with these findings:  []  Laboratory list / accordion  []  Microbiology  []  Radiology  []  EKG/Telemetry   []  Cardiology/Vascular   []  Pathology  [x]  Old records  []  Other:  Most notable findings include:      LAB RESULTS:      Lab 03/28/25  1005   WBC 7.54   HEMOGLOBIN 14.5   HEMATOCRIT 45.8   PLATELETS 262   NEUTROS ABS 5.28   IMMATURE GRANS (ABS) 0.02   LYMPHS ABS 1.59   MONOS ABS 0.52   EOS ABS 0.07   MCV 94.0         Lab 03/29/25  1437 03/28/25  1005   SODIUM  --  138   POTASSIUM   --  4.6   CHLORIDE  --  105   CO2  --  24.1   ANION GAP  --  8.9   BUN  --  30*   CREATININE  --  1.16   EGFR  --  68.2   GLUCOSE  --  302*   CALCIUM  --  9.1   MAGNESIUM 2  --          Lab 03/28/25  1005   TOTAL PROTEIN 6.9   ALBUMIN 4.0   GLOBULIN 2.9   ALT (SGPT) 35   AST (SGOT) 32   BILIRUBIN 0.5   ALK PHOS 86   LIPASE 20         Lab 03/28/25  1005   PROBNP 1,915.0*                 Brief Urine Lab Results       None          Microbiology Results (last 10 days)       ** No results found for the last 240 hours. **            XR Chest 1 View  Result Date: 3/29/2025  XR CHEST 1 VW, XR ABDOMEN KUB Date of Exam: 3/29/2025 9:38 PM EDT Indication: CHF exacerbation; dyspnea abdominal pain Comparison: 6/14/2022 Findings: Patchy airspace disease is seen within the bilateral lower lobes, right greater than left. Small bilateral pleural effusions are present, right greater than left.. No pneumothorax. The pulmonary vasculature appears within normal limits. The cardiac and mediastinal silhouette appear unremarkable. No acute osseous abnormality identified. No gross free air or pneumatosis though evaluation is slightly limited due to technique. There is a non obstructive bowel gas pattern. A mild to moderate stool burden is present.  No suspicious calcifications identified. No acute osseous abnormality identified.     Impression: Impression: 1.Bibasilar pneumonia, right greater than left with small bilateral pleural effusions, right greater than left. 2.Nonobstructive bowel gas pattern. Mild to moderate stool burden present. Electronically Signed: Jacquelyn Woodson MD  3/29/2025 10:02 PM EDT  Workstation ID: [id number]    XR Abdomen KUB  Result Date: 3/29/2025  XR CHEST 1 VW, XR ABDOMEN KUB Date of Exam: 3/29/2025 9:38 PM EDT Indication: CHF exacerbation; dyspnea abdominal pain Comparison: 6/14/2022 Findings: Patchy airspace disease is seen within the bilateral lower lobes, right greater than left. Small bilateral pleural effusions  are present, right greater than left.. No pneumothorax. The pulmonary vasculature appears within normal limits. The cardiac and mediastinal silhouette appear unremarkable. No acute osseous abnormality identified. No gross free air or pneumatosis though evaluation is slightly limited due to technique. There is a non obstructive bowel gas pattern. A mild to moderate stool burden is present.  No suspicious calcifications identified. No acute osseous abnormality identified.     Impression: Impression: 1.Bibasilar pneumonia, right greater than left with small bilateral pleural effusions, right greater than left. 2.Nonobstructive bowel gas pattern. Mild to moderate stool burden present. Electronically Signed: Jacquelyn Woodson MD  3/29/2025 10:02 PM EDT  Workstation ID: [id number]          Assessment & Plan  Assessment & Plan       CHF exacerbation    Diabetic polyneuropathy associated with type 2 diabetes mellitus    Arthritis    Stage 2 chronic kidney disease    Type 2 diabetes mellitus with hyperglycemia, with long-term current use of insulin    Essential hypertension    Hyperlipidemia    Dyspnea on exertion    Unilateral AKA, right    Orthopnea    RLQ abdominal pain    Chronic idiopathic constipation    Dysuria    CHF exacerbation  Dyspnea on exertion  Orthopnea  - continue farxiga, formulary sub to jardiance  - last ECHO 2022 w/ EF 60-65% and mild aortic stenosis  - s/p lasix 40 mg IV at OSH, repeat now and continue BID  - ECHO in am  - cardiology consult, Dr. Barrera  - daily weights, strict I/o  - EKG, troponin, BNP now  - BNP from PCP 1915 3/28/2025, previously 1440.0 6/14/2022  - HOB elevated for comfort  - oxygen PRN  - check d dimer, if positive will obtain CTA chest to r/o PE  - chest xray now, no results for chest xray found from OSH  -- chest xray showing bibasilar pneumonia L > R w/ small bilateral pleural effusions, adding unasyn / doxy, nebs, IS/flutter valve and will obtain blood cultures, procal, mrsa pcr  nares, urinary ag for legionella and s.pnemo    Elevated troponin  Prolonged QTc  Tachycardia / PVC's on EKG  - repeat troponin and EKG now  - denies chest pain  - check tsh, mag    RLQ pain  Constipation, chronic  Dysuria  - continue PPI  - continue metamucil / miralax  - bowel regimen scheduled  - KUB now  -- showing mild to moderate stool burden w/ nonobstructive gas pattern  - urinalysis  - check lipase, lactic    HTN   HLD  Hx CVA  - continue metoprolol  - continue aspirin / statin    PAD / Hx of R AKA (r/t osteomyelitis)  - PT / OT consult for mobility  - previously using prosthesis but currently not able to and using w/c    T2DM - insulin dependent  Diabetic polyneuropathy  - lantus 10 units daily (on 22 units long acting at home)  - continue farxiga  - hold metformin, glimepiride  - continue gabapentin for neuropathy, home dose 600 mg TID  - fsbg achs w/ low dose ssi  - check A1c  - glu 302 on labs from PCP, 134 today    CKD 2  - creatinine 1.06 at OSH, baseline  - monitor closely w/ diuresis  - strict I/o       DVT prophylaxis:  Heparin SC TID    CODE STATUS:     Code Status (Patient has no pulse and is not breathing): CPR (Attempt to Resuscitate)  Medical Interventions (Patient has pulse or is breathing): Full Support      Expected Discharge     Expected Discharge Date: 3/31/2025; Expected Discharge Time:     Signature: Electronically signed by TOMÁS Vaughn, 03/29/25, 10:18 PM EDT    Total time spent: 70 minutes  Time spent includes time reviewing chart, face-to-face time, counseling patient/family/caregiver, ordering medications/tests/procedures, communicating with other health care professionals, documenting clinical information in the electronic health record, and coordination of care.              Electronically signed by Katie Shi MD at 03/29/25 2318       Facility-Administered Medications as of 3/31/2025   Medication Dose Route Frequency Provider Last Rate Last Admin    acetaminophen  (TYLENOL) tablet 650 mg  650 mg Oral Q4H PRN Ashlee Rivera APRN        Or    acetaminophen (TYLENOL) 160 MG/5ML oral solution 650 mg  650 mg Oral Q4H PRN Ashlee Rivera APRN        Or    acetaminophen (TYLENOL) suppository 650 mg  650 mg Rectal Q4H PRN Ashlee Rivera APRN        albuterol (PROVENTIL) nebulizer solution 0.083% 2.5 mg/3mL  2.5 mg Nebulization Q4H PRN Ashlee Rivera APRN        ampicillin-sulbactam (UNASYN) 3 g in sodium chloride 0.9 % 100 mL MBP  3 g Intravenous Q6H Ashlee Rivera APRN   3 g at 03/31/25 1236    aspirin tablet 162 mg  162 mg Oral Daily Ashlee Rivera APRN   162 mg at 03/31/25 1000    sennosides-docusate (PERICOLACE) 8.6-50 MG per tablet 2 tablet  2 tablet Oral BID Ashlee Rivera APRN   2 tablet at 03/31/25 1000    And    polyethylene glycol (MIRALAX) packet 17 g  17 g Oral Daily PRN Ashlee Rivera APRN        And    bisacodyl (DULCOLAX) EC tablet 5 mg  5 mg Oral Daily PRN Ashlee Rivera APRN        And    bisacodyl (DULCOLAX) suppository 10 mg  10 mg Rectal Daily PRN Ashlee Rivera APRN        Calcium Replacement - Follow Nurse / BPA Driven Protocol   Not Applicable PRN Ashlee Rivera APRN        dextrose (D50W) (25 g/50 mL) IV injection 25 g  25 g Intravenous Q15 Min PRN Ashlee Rivera APRN        dextrose (GLUTOSE) oral gel 15 g  15 g Oral Q15 Min PRN Ashlee Rivera APRN        Diclofenac Sodium (VOLTAREN) 1 % gel 2 g  2 g Topical BID Ministerio Betancourt MD   2 g at 03/31/25 1238    doxycycline (VIBRAMYCIN) 100 mg in sodium chloride 0.9 % 100 mL MBP  100 mg Intravenous Q12H Ashlee Rivera APRN   100 mg at 03/31/25 0041    empagliflozin (JARDIANCE) tablet 25 mg  25 mg Oral Daily Ashlee Rivera APRN   25 mg at 03/31/25 1000    [Held by provider] furosemide (LASIX) injection 40 mg  40 mg Intravenous BID Ashlee Rivera APRN   40 mg at 03/31/25 1000    gabapentin (NEURONTIN) capsule 600 mg  600 mg Oral Q8H Ashlee Rivera APRN   600 mg at 03/31/25 0600    glucagon (GLUCAGEN)  injection 1 mg  1 mg Intramuscular Q15 Min PRN Ashlee Rivera APRN        guaiFENesin (MUCINEX) 12 hr tablet 600 mg  600 mg Oral Q12H Ashlee Rivera APRN   600 mg at 03/31/25 1236    heparin (porcine) 5000 UNIT/ML injection 5,000 Units  5,000 Units Subcutaneous Q8H Ashlee Rivera APRN   5,000 Units at 03/31/25 0604    insulin glargine (LANTUS, SEMGLEE) injection 10 Units  10 Units Subcutaneous Daily Ashlee Rivera APRN   10 Units at 03/31/25 1000    Insulin Lispro (humaLOG) injection 2 Units  2 Units Subcutaneous TID With Meals Ministerio Betancourt MD   2 Units at 03/31/25 1236    Insulin Lispro (humaLOG) injection 2-7 Units  2-7 Units Subcutaneous 4x Daily AC & at Bedtime Ashlee Rivera APRN   2 Units at 03/31/25 1000    ipratropium-albuterol (DUO-NEB) nebulizer solution 3 mL  3 mL Nebulization Q4H PRN Imelda Ambrocio MD        Magnesium Standard Dose Replacement - Follow Nurse / BPA Driven Protocol   Not Applicable PRN Ashlee Rivera APRN        metoprolol tartrate (LOPRESSOR) tablet 25 mg  25 mg Oral BID Ashlee Rivera APRN   25 mg at 03/31/25 1000    nitroglycerin (NITROSTAT) SL tablet 0.4 mg  0.4 mg Sublingual Q5 Min PRN Ashlee Rivera APRN        pantoprazole (PROTONIX) EC tablet 40 mg  40 mg Oral Q AM Ashlee Rivera APRN   40 mg at 03/31/25 0600    Pharmacy Consult - MTM   Not Applicable Daily Marvel Dean, PharmD        Phosphorus Replacement - Follow Nurse / BPA Driven Protocol   Not Applicable PRN Ashlee Rivera APRN        polyethylene glycol (MIRALAX) packet 17 g  17 g Oral Daily Ashlee Rivera APRN   17 g at 03/31/25 1000    Polyvinyl Alcohol-Povidone PF (ARTIFICIAL TEARS) 1.4-0.6 % ophthalmic solution 2 drop  2 drop Both Eyes Q1H PRN Ministerio Betancourt MD        [COMPLETED] potassium chloride (KLOR-CON M20) CR tablet 20 mEq  20 mEq Oral Once Katie Shi MD   20 mEq at 03/30/25 0532    Potassium Replacement - Follow Nurse / BPA Driven Protocol   Not Applicable PRN Ashlee Rivera, APRN      "   promethazine (PHENERGAN) tablet 12.5 mg  12.5 mg Oral Q6H PRN Ashlee Rivera APRN        Or    promethazine (PHENERGAN) suppository 12.5 mg  12.5 mg Rectal Q6H PRN Ashlee Rivera APRN        Psyllium (METAMUCIL MULTIHEALTH FIBER) 51.7 % packet 1 packet  1 packet Oral Daily Ashlee Rivera APRN   1 packet at 03/31/25 1000    rosuvastatin (CRESTOR) tablet 20 mg  20 mg Oral Daily Ashlee Rivera APRN   20 mg at 03/31/25 1000    sodium chloride 0.9 % flush 10 mL  10 mL Intravenous Q12H Ashlee Rivera APRN   10 mL at 03/31/25 1039    sodium chloride 0.9 % flush 10 mL  10 mL Intravenous PRN Ashlee Rivera APRN        sodium chloride 0.9 % infusion 40 mL  40 mL Intravenous PRN Ashlee Rivera APRN        [COMPLETED] sodium chloride 7 % nebulizer solution nebulizer solution 4 mL  4 mL Nebulization Daily - RT Ministerio Betancourt MD   4 mL at 03/31/25 0716        Physician Progress Notes (all)        Becky Villela MD at 03/31/25 1257          [facility] Progress Note      Chief Complaint: Follow-up of acute HFrEF/cardiomyopathy/non-STEMI    Subjective   Denies chest pain, states that he has urinated more than 3 gallons and his breathing has remarkably improved.  No edema.  Has been feeling poorly since December with symptoms of fatigue and shortness of breath.      Objective     Blood pressure 123/82, pulse 84, temperature 97.6 °F (36.4 °C), temperature source Oral, resp. rate 16, height 172 cm (67.72\"), weight 92 kg (202 lb 13.2 oz), SpO2 93%.     Intake/Output Summary (Last 24 hours) at 3/31/2025 1257  Last data filed at 3/31/2025 1236  Gross per 24 hour   Intake 0 ml   Output 5100 ml   Net -5100 ml       Physical Exam:  General: No acute distress.   Neck: no JVD.  Chest:No respiratory distress, breath sounds are normal. No wheezes,  rhonchi or rales.  Cardiovascular: Normal S1 and S2, no murmur, gallop or rub.    Extremities: No edema.  Right BKA    Results Review:     I " reviewed the patient's new clinical results.    Results from last 7 days   Lab Units 03/31/25  1204   WBC 10*3/mm3 6.52   HEMOGLOBIN g/dL 15.3   HEMATOCRIT % 47.4   PLATELETS 10*3/mm3 247     Results from last 7 days   Lab Units 03/31/25  1204   SODIUM mmol/L 143   POTASSIUM mmol/L 3.8   CHLORIDE mmol/L 98   CO2 mmol/L 29.0   BUN mg/dL 30*   CREATININE mg/dL 1.41*   CALCIUM mg/dL 9.4   BILIRUBIN mg/dL 0.8   ALK PHOS U/L 90   ALT (SGPT) U/L 16   AST (SGOT) U/L 16   GLUCOSE mg/dL 181*     Results from last 7 days   Lab Units 03/31/25  1204   SODIUM mmol/L 143   POTASSIUM mmol/L 3.8   CHLORIDE mmol/L 98   CO2 mmol/L 29.0   BUN mg/dL 30*   CREATININE mg/dL 1.41*   GLUCOSE mg/dL 181*   CALCIUM mg/dL 9.4         Lab Results   Component Value Date    CKTOTAL 37 11/22/2021    TROPONINT 41 (H) 03/30/2025     Results from last 7 days   Lab Units 03/30/25  0101   TSH uIU/mL 2.530     Results from last 7 days   Lab Units 03/30/25  0826   CHOLESTEROL mg/dL 167   TRIGLYCERIDES mg/dL 137   HDL CHOL mg/dL 32*   LDL CHOL mg/dL 110*     ampicillin-sulbactam, 3 g, Intravenous, Q6H  aspirin, 162 mg, Oral, Daily  Diclofenac Sodium, 2 g, Topical, BID  doxycycline, 100 mg, Intravenous, Q12H  empagliflozin, 25 mg, Oral, Daily  furosemide, 40 mg, Intravenous, BID  gabapentin, 600 mg, Oral, Q8H  guaiFENesin, 600 mg, Oral, Q12H  heparin (porcine), 5,000 Units, Subcutaneous, Q8H  insulin glargine, 10 Units, Subcutaneous, Daily  Insulin Lispro, 2 Units, Subcutaneous, TID With Meals  insulin lispro, 2-7 Units, Subcutaneous, 4x Daily AC & at Bedtime  metoprolol tartrate, 25 mg, Oral, BID  pantoprazole, 40 mg, Oral, Q AM  pharmacy consult - MTM, , Not Applicable, Daily  polyethylene glycol, 17 g, Oral, Daily  Psyllium, 1 packet, Oral, Daily  rosuvastatin, 20 mg, Oral, Daily  senna-docusate sodium, 2 tablet, Oral, BID  sodium chloride, 10 mL, Intravenous, Q12H       Tele: Sinus Rythym      Assessment:  Acute HFrEF, diuresed well,  improved  Cardiomyopathy, EF 36-40%, unclear etiology  Non-STEMI, most probably type II  Valvular heart disease, moderate to severe mitral regurgitation, mild aortic stenosis.  Hypertension.  Dyslipidemia.  Type 2 diabetes.  CKD.  Plan:  The patient has diuresed well, hold further diuretics.  Continue aspirin, metoprolol, Jardiance and statin.  Cardiac cath today, PCI if indicated.  The procedure was explained to the patient/family extensively. Indications, benefits, risks and alternatives were discussed. The patient understands well, and wishes to proceed.   Consider adding ARB/Entresto tomorrow if renal function remains stable.    Becky Villela MD, FACC, [facility]                                    Electronically signed by Becky Villela MD at 25 1303       Ministerio Betancourt MD at 25 1036              [facility] Medicine Services  PROGRESS NOTE    Patient Name: Scott Benz  : 1955  MRN: [id number]    Date of Admission: 3/29/2025  Primary Care Physician: Josh Green PA-C    Subjective   Subjective     CC: Dyspnea    HPI: Dyspnea better. Hungry. No f/c. No n/v. Worried about constipation, but having BM.       Objective   Objective     Vital Signs:   Temp:  [97.5 °F (36.4 °C)-98.6 °F (37 °C)] 97.5 °F (36.4 °C)  Heart Rate:  [75-98] 76  Resp:  [18-20] 18  BP: (138-150)/() 144/91  Flow (L/min) (Oxygen Therapy):  [1] 1     Physical Exam:  NAD, alert and oriented  OP clear, dry MM  Neck supple  No LAD  RRR  Decreased at bases, otherwise clear, rales at bases  +BS, soft, NT  LIN  Normal affect    Results Reviewed:  LAB RESULTS:      Lab 25  0826 25  0101 25  1005   WBC 5.61 7.50 7.54   HEMOGLOBIN 14.8 14.6 14.5   HEMATOCRIT 44.9 45.3 45.8   PLATELETS 228 258 262   NEUTROS ABS 3.17 4.31 5.28   IMMATURE GRANS (ABS) 0.01 0.02 0.02   LYMPHS ABS 1.71 2.30 1.59   MONOS ABS 0.55 0.68 0.52   EOS ABS 0.12 0.13 0.07   MCV 93.3 92.3 94.0   PROCALCITONIN  --  0.05  --     LACTATE  --  1.2  --    D DIMER QUANT  --  0.52  --    HSTROP T 41* 39*  --          Lab 03/30/25  0826 03/30/25  0101 03/29/25  1437 03/28/25  1005   SODIUM 143 141  --  138   POTASSIUM 4.2 3.8  --  4.6   CHLORIDE 103 101  --  105   CO2 25.0 26.0  --  24.1   ANION GAP 15.0 14.0  --  8.9   BUN 26* 27*  --  30*   CREATININE 0.95 1.09  --  1.16   EGFR 86.6 73.5  --  68.2   GLUCOSE 158* 193*  --  302*   CALCIUM 8.8 9.0  --  9.1   MAGNESIUM  --  2.0 2  --    HEMOGLOBIN A1C  --  6.90*  --   --    TSH  --  2.530  --   --          Lab 03/30/25  0101 03/28/25  1005   TOTAL PROTEIN 7.0 6.9   ALBUMIN 4.0 4.0   GLOBULIN 3.0 2.9   ALT (SGPT) 23 35   AST (SGOT) 19 32   BILIRUBIN 0.8 0.5   ALK PHOS 80 86   LIPASE 17 20         Lab 03/30/25  0826 03/30/25  0101 03/28/25  1005   PROBNP  --  2,477.0* 1,915.0*   HSTROP T 41* 39*  --          Lab 03/30/25  0826   CHOLESTEROL 167   LDL CHOL 110*   HDL CHOL 32*   TRIGLYCERIDES 137             Brief Urine Lab Results  (Last result in the past 365 days)        Color   Clarity   Blood   Leuk Est   Nitrite   Protein   CREAT   Urine HCG        03/29/25 2258 Yellow   Clear   Trace   Moderate (2+)   Negative   Negative                   Microbiology Results Abnormal       None            XR Chest 1 View  Result Date: 3/29/2025  XR CHEST 1 VW, XR ABDOMEN KUB Date of Exam: 3/29/2025 9:38 PM EDT Indication: CHF exacerbation; dyspnea abdominal pain Comparison: 6/14/2022 Findings: Patchy airspace disease is seen within the bilateral lower lobes, right greater than left. Small bilateral pleural effusions are present, right greater than left.. No pneumothorax. The pulmonary vasculature appears within normal limits. The cardiac and mediastinal silhouette appear unremarkable. No acute osseous abnormality identified. No gross free air or pneumatosis though evaluation is slightly limited due to technique. There is a non obstructive bowel gas pattern. A mild to moderate stool burden is present.  No  suspicious calcifications identified. No acute osseous abnormality identified.     Impression: Impression: 1.Bibasilar pneumonia, right greater than left with small bilateral pleural effusions, right greater than left. 2.Nonobstructive bowel gas pattern. Mild to moderate stool burden present. Electronically Signed: Jacquelyn Woodson MD  3/29/2025 10:02 PM EDT  Workstation ID: [id number]    XR Abdomen KUB  Result Date: 3/29/2025  XR CHEST 1 VW, XR ABDOMEN KUB Date of Exam: 3/29/2025 9:38 PM EDT Indication: CHF exacerbation; dyspnea abdominal pain Comparison: 6/14/2022 Findings: Patchy airspace disease is seen within the bilateral lower lobes, right greater than left. Small bilateral pleural effusions are present, right greater than left.. No pneumothorax. The pulmonary vasculature appears within normal limits. The cardiac and mediastinal silhouette appear unremarkable. No acute osseous abnormality identified. No gross free air or pneumatosis though evaluation is slightly limited due to technique. There is a non obstructive bowel gas pattern. A mild to moderate stool burden is present.  No suspicious calcifications identified. No acute osseous abnormality identified.     Impression: Impression: 1.Bibasilar pneumonia, right greater than left with small bilateral pleural effusions, right greater than left. 2.Nonobstructive bowel gas pattern. Mild to moderate stool burden present. Electronically Signed: Jacquelyn Woodson MD  3/29/2025 10:02 PM EDT  Workstation ID: [id number]          Current medications:  Scheduled Meds:ampicillin-sulbactam, 3 g, Intravenous, Q6H  aspirin, 162 mg, Oral, Daily  doxycycline, 100 mg, Intravenous, Q12H  empagliflozin, 25 mg, Oral, Daily  furosemide, 40 mg, Intravenous, BID  gabapentin, 600 mg, Oral, Q8H  guaiFENesin, 600 mg, Oral, Q12H  heparin (porcine), 5,000 Units, Subcutaneous, Q8H  insulin glargine, 10 Units, Subcutaneous, Daily  insulin lispro, 2-7 Units, Subcutaneous, 4x Daily AC & at  Bedtime  ipratropium-albuterol, 3 mL, Nebulization, Q4H - RT  metoprolol tartrate, 25 mg, Oral, BID  pantoprazole, 40 mg, Oral, Q AM  pharmacy consult - MTM, , Not Applicable, Daily  polyethylene glycol, 17 g, Oral, Daily  Psyllium, 1 packet, Oral, Daily  rosuvastatin, 20 mg, Oral, Daily  senna-docusate sodium, 2 tablet, Oral, BID  sodium chloride, 10 mL, Intravenous, Q12H      Continuous Infusions:   PRN Meds:.  acetaminophen **OR** acetaminophen **OR** acetaminophen    albuterol    senna-docusate sodium **AND** polyethylene glycol **AND** bisacodyl **AND** bisacodyl    Calcium Replacement - Follow Nurse / BPA Driven Protocol    dextrose    dextrose    glucagon (human recombinant)    Magnesium Standard Dose Replacement - Follow Nurse / BPA Driven Protocol    nitroglycerin    Phosphorus Replacement - Follow Nurse / BPA Driven Protocol    Potassium Replacement - Follow Nurse / BPA Driven Protocol    promethazine **OR** promethazine    sodium chloride    sodium chloride    Assessment & Plan   Assessment & Plan     Active Hospital Problems    Diagnosis  POA    **CHF exacerbation [I50.9]  Yes    Arthritis [M19.90]  Yes    Stage 2 chronic kidney disease [N18.2]  Yes    Orthopnea [R06.01]  Unknown    RLQ abdominal pain [R10.31]  Unknown    Chronic idiopathic constipation [K59.04]  Unknown    Dysuria [R30.0]  Unknown    Pneumonia of both lower lobes due to infectious organism [J18.9]  Unknown    Unilateral AKA, right [S78.111A]  Yes    Type 2 diabetes mellitus with hyperglycemia, with long-term current use of insulin [E11.65, Z79.4]  Not Applicable    Dyspnea on exertion [R06.09]  Yes    Essential hypertension [I10]  Yes    Hyperlipidemia [E78.5]  Yes    Diabetic polyneuropathy associated with type 2 diabetes mellitus [E11.42]  Yes      Resolved Hospital Problems    Diagnosis Date Resolved POA    Diabetic nephropathy [E11.21] 03/29/2025 Yes        Brief Hospital Course to date:  Scott Benz is a 69 y.o. male with history of  CKD, chronic constipation, DM, HTN, HL, progressive dyspnea    A/C CHF  CARMONA  Orthopnea  -ECHO pending  -continue diuresis per cardiology, on lasix  -on jardiance  -entresto per cardiology  -possible ischemic evaluation, pending ECHO    Possible superimposed PNA  -continue antibiotics for now, pulmonary toilet    RLQ pain  Constipation  -bowel regimen, better after BM    HTN  HL  History of CVA  -on BB, asa/statin    PAD, history of R AKA secondary to osteomyelitis    DM  PN  -insulin, titrate prn    CKD  -monitor renal function on diuretics      Expected Discharge Location and Transportation: Home  Expected Discharge   Expected Discharge Date: 3/31/2025; Expected Discharge Time:      VTE Prophylaxis:  Pharmacologic VTE prophylaxis orders are present.         AM-PAC 6 Clicks Score (PT): 11 (03/29/25 2037)    CODE STATUS:   Code Status and Medical Interventions: CPR (Attempt to Resuscitate); Full Support   Ordered at: 03/29/25 2149     Code Status (Patient has no pulse and is not breathing):    CPR (Attempt to Resuscitate)     Medical Interventions (Patient has pulse or is breathing):    Full Support       Ministerio Betancourt MD  03/30/25        Electronically signed by Ministerio Betancourt MD at 03/30/25 1042          Consult Notes (all)        Ramon Sarabia MD at 03/30/25 0914        Consult Orders    1. Inpatient Cardiology Consult [[id number]] ordered by Ashlee Rivrea APRN at 03/29/25 2149                 [facility]  Cardiovascular Consultation Note    Reason for consultation: New onset CHF with 2 abnormal EKG showing left bundle branch block    History of Present Illness:  69-year-old male non-smoker, has diabetes, hypertension, hyperlipidemia, GERD who presents complaining of shortness of breath,, edema and orthopnea.  His last known EF in 2022 was normal he was also told to come to the ER because of abnormal labs.  Patient states he had no cardiac symptoms or any issues  until September.  At that time he began to have shortness of breath and edema..  He denies any tightness heaviness squeezing pressure chest jaw throat arms.  He did not know he had a left bundle branch block.  He does not feel his heart fluttering.  He states he had profound lower extremity edema when he came in it.  Since receiving some diuretics his breathing is improved.  States he had a stroke before with minimal changes to the left upper extremity.    Cardiac risk factors: #1 male sex #2 atrium 55 #3 hypertension #4 hyperlipidemia #5 diabetes #6 vascular disease    Past medical and surgical history, social and family history reviewed in EMR.    REVIEW OF SYSTEMS:     Past Medical History:   Diagnosis Date    Arthritis     with hands    CHF (congestive heart failure)     CKD (chronic kidney disease)     CVA (cerebral vascular accident)     Diabetes     H/O lower limb amputation     Hypertension     Stroke 11/10/2019     Past Surgical History:   Procedure Laterality Date    ABOVE KNEE LEG AMPUTATION      AMPUTATION DIGIT Left 07/23/2019    Procedure: excision left 4th metatarsal;  Surgeon: Shannan Kraft MD;  Location: [facility] OR;  Service: Orthopedics    APPENDECTOMY      INCISION AND DRAINAGE LEG Left 07/23/2019    Procedure: I&D left foot wound;  Surgeon: Shannan Kraft MD;  Location: [facility] OR;  Service: Orthopedics     Social History     Socioeconomic History    Marital status:    Tobacco Use    Smoking status: Never    Smokeless tobacco: Never   Vaping Use    Vaping status: Never Used   Substance and Sexual Activity    Alcohol use: No    Drug use: No    Sexual activity: Defer     Family History   Problem Relation Age of Onset    Stroke Father     Diabetes Father        H&P ROS reviewed and pertinent CV ROS as noted in HPI.    Cardiac: Complains of lower extremity swelling.  Complains of increased dyspnea and significant orthopnea/no neck chest jaw pain  Respiratory: Complains of significant  dyspnea on exertion.  No hemoptysis  Lower Extremities: Patient has peripheral vascular disease with previous AKA as well as a femoropopliteal bypass  GI: Complains of constipation  Neuro: Has a history of stroke  Hematology: No bleeding diathesis ecchymosis or petechiae  Renal: Has mildly reduced GFR which is improved from yesterday  Musculoskeletal: Status post right AKA  Endocrine: Has diabetes but no known hypothyroidism  Constitutional: No fever chills or weight loss  Psych: No depression or eron suicidal ideation      Physical Exam: General Pleasant middle-age male in bed at a 60 degree angle with no resting dyspnea or tachypnea       HEENT: No bruits.  No significant JVP.  No icterus       Respiratory: Equal bilateral symmetrical expansion with bilateral rales       Cardiovascular: Regular rate and rhythm with frequent ectopics and a systolic ejection murmur and pitting edema of the left lower extremities       GI: Soft no obvious bruits       Lower Extremities: Status post right AKA       Neuro: Facial expressions are symmetrical moves all 4 extremities       Skin: Warm and dry with pitting edema left lower extremity       Psych: Pleasant affect orient x 3    Results Review: EKG shows sinus rhythm left bundle branch block.  Bundle branch block is new from EKG dated 6/20/2022.  I personally viewed the chest x-ray which shows some pleural effusions.  Hemoglobin 14.8.  BNP is 2477.  HST is elevated but flat at 39 and 41.  GFR was 68.2 on admission and is now 73.5.      Objective     Vital Sign Min/Max for last 24 hours  Temp  Min: 97.5 °F (36.4 °C)  Max: 98.6 °F (37 °C)   BP  Min: 138/89  Max: 150/100   Pulse  Min: 75  Max: 98   Resp  Min: 18  Max: 20   SpO2  Min: 93 %  Max: 98 %   No data recorded      Intake/Output Summary (Last 24 hours) at 3/30/2025 0914  Last data filed at 3/30/2025 0055  Gross per 24 hour   Intake 240 ml   Output 1550 ml   Net -1310 ml             Current Facility-Administered  Medications:     acetaminophen (TYLENOL) tablet 650 mg, 650 mg, Oral, Q4H PRN **OR** acetaminophen (TYLENOL) 160 MG/5ML oral solution 650 mg, 650 mg, Oral, Q4H PRN **OR** acetaminophen (TYLENOL) suppository 650 mg, 650 mg, Rectal, Q4H PRN, Ashlee Rivera, APRN    albuterol (PROVENTIL) nebulizer solution 0.083% 2.5 mg/3mL, 2.5 mg, Nebulization, Q4H PRN, Ashlee Rivera, APRN    ampicillin-sulbactam (UNASYN) 3 g in sodium chloride 0.9 % 100 mL MBP, 3 g, Intravenous, Q6H, Ashlee Rivera APRN, 3 g at 03/30/25 0533    aspirin tablet 162 mg, 162 mg, Oral, Daily, Ashlee Rivera, APRN    sennosides-docusate (PERICOLACE) 8.6-50 MG per tablet 2 tablet, 2 tablet, Oral, BID, 2 tablet at 03/29/25 2230 **AND** polyethylene glycol (MIRALAX) packet 17 g, 17 g, Oral, Daily PRN **AND** bisacodyl (DULCOLAX) EC tablet 5 mg, 5 mg, Oral, Daily PRN **AND** bisacodyl (DULCOLAX) suppository 10 mg, 10 mg, Rectal, Daily PRN, Ashlee Rivera, APRN    Calcium Replacement - Follow Nurse / BPA Driven Protocol, , Not Applicable, PRN, Ashlee Rivera, APRN    dextrose (D50W) (25 g/50 mL) IV injection 25 g, 25 g, Intravenous, Q15 Min PRN, Ashlee Rivera APRN    dextrose (GLUTOSE) oral gel 15 g, 15 g, Oral, Q15 Min PRN, Ashlee Rivera APRN    doxycycline (VIBRAMYCIN) 100 mg in sodium chloride 0.9 % 100 mL MBP, 100 mg, Intravenous, Q12H, Ashlee Rivera, APRN, 100 mg at 03/30/25 0154    empagliflozin (JARDIANCE) tablet 25 mg, 25 mg, Oral, Daily, Ashlee Rivera APRN    furosemide (LASIX) injection 40 mg, 40 mg, Intravenous, BID, Ashlee Rivera, APRN, 40 mg at 03/29/25 2230    gabapentin (NEURONTIN) capsule 600 mg, 600 mg, Oral, Q8H, Ashlee Rivera, APRN, 600 mg at 03/30/25 0532    glucagon (GLUCAGEN) injection 1 mg, 1 mg, Intramuscular, Q15 Min PRN, Ashlee Rivera, APRN    guaiFENesin (MUCINEX) 12 hr tablet 600 mg, 600 mg, Oral, Q12H, Ashlee Rivera, APRN, 600 mg at 03/30/25 0109    heparin (porcine) 5000 UNIT/ML injection 5,000 Units, 5,000 Units,  Subcutaneous, Q8H, Ashlee Rivera APRN, 5,000 Units at 03/30/25 0532    insulin glargine (LANTUS, SEMGLEE) injection 10 Units, 10 Units, Subcutaneous, Daily, Ashlee Rivera APRN    Insulin Lispro (humaLOG) injection 2-7 Units, 2-7 Units, Subcutaneous, 4x Daily AC & at Bedtime, Ashlee Rivera APRN    ipratropium-albuterol (DUO-NEB) nebulizer solution 3 mL, 3 mL, Nebulization, Q4H - RT, Ashlee Rivera APRN, 3 mL at 03/30/25 0813    Magnesium Standard Dose Replacement - Follow Nurse / BPA Driven Protocol, , Not Applicable, PRN, Ashlee Rivera APRN    metoprolol tartrate (LOPRESSOR) tablet 25 mg, 25 mg, Oral, BID, Ashlee Rivera APRN, 25 mg at 03/29/25 2230    nitroglycerin (NITROSTAT) SL tablet 0.4 mg, 0.4 mg, Sublingual, Q5 Min PRN, Ashlee Rivera APRN    pantoprazole (PROTONIX) EC tablet 40 mg, 40 mg, Oral, Q AM, Ashlee Rivera APRN, 40 mg at 03/30/25 0532    Phosphorus Replacement - Follow Nurse / BPA Driven Protocol, , Not Applicable, PRN, Ashlee Rivera APRN    polyethylene glycol (MIRALAX) packet 17 g, 17 g, Oral, Daily, Ashlee Rivera APRN    Potassium Replacement - Follow Nurse / BPA Driven Protocol, , Not Applicable, PRN, Ashlee Rivera APRN    promethazine (PHENERGAN) tablet 12.5 mg, 12.5 mg, Oral, Q6H PRN **OR** promethazine (PHENERGAN) suppository 12.5 mg, 12.5 mg, Rectal, Q6H PRN, Ashlee Rivera APRN    Psyllium (METAMUCIL MULTIHEALTH FIBER) 51.7 % packet 1 packet, 1 packet, Oral, Daily, Ashlee Rivera APRN    rosuvastatin (CRESTOR) tablet 20 mg, 20 mg, Oral, Daily, Ashlee Rivera APRN    sodium chloride 0.9 % flush 10 mL, 10 mL, Intravenous, Q12H, Ashlee Rivera APRN, 10 mL at 03/29/25 2231    sodium chloride 0.9 % flush 10 mL, 10 mL, Intravenous, PRN, Ashlee Rivera, TOMÁS    sodium chloride 0.9 % infusion 40 mL, 40 mL, Intravenous, PRN, Ashlee Rivera, TOMÁS    Lab Review:   Results from last 7 days   Lab Units 03/30/25  0826 03/30/25  0101 03/28/25  1005   WBC 10*3/mm3 5.61 7.50 7.54    HEMOGLOBIN g/dL 14.8 14.6 14.5   PLATELETS 10*3/mm3 228 258 262     Results from last 7 days   Lab Units 03/30/25  0101 03/29/25  1437 03/28/25  1005   SODIUM mmol/L 141  --  138   POTASSIUM mmol/L 3.8  --  4.6   CO2 mmol/L 26.0  --  24.1   BUN mg/dL 27*  --  30*   CREATININE mg/dL 1.09  --  1.16   MAGNESIUM mg/dL 2.0 2  --    GLUCOSE mg/dL 193*  --  302*     Estimated Creatinine Clearance: 72.4 mL/min (by C-G formula based on SCr of 1.09 mg/dL).    Results from last 7 days   Lab Units 03/30/25  0101   HEMOGLOBIN A1C % 6.90*     .lipd  Lab Results   Component Value Date    AST 19 03/30/2025    ALT 23 03/30/2025       Radiology Reports:  Imaging Results (Last 72 Hours)       Procedure Component Value Units Date/Time    XR Chest 1 View [[id number]] Collected: 03/29/25 2201     Updated: 03/29/25 2205    Narrative:      XR CHEST 1 VW, XR ABDOMEN KUB    Date of Exam: 3/29/2025 9:38 PM EDT    Indication: CHF exacerbation; dyspnea abdominal pain    Comparison: 6/14/2022    Findings:  Patchy airspace disease is seen within the bilateral lower lobes, right greater than left. Small bilateral pleural effusions are present, right greater than left.. No pneumothorax. The pulmonary vasculature appears within normal limits. The cardiac and   mediastinal silhouette appear unremarkable. No acute osseous abnormality identified.    No gross free air or pneumatosis though evaluation is slightly limited due to technique. There is a non obstructive bowel gas pattern. A mild to moderate stool burden is present.  No suspicious calcifications identified. No acute osseous abnormality   identified.      Impression:      Impression:  1.Bibasilar pneumonia, right greater than left with small bilateral pleural effusions, right greater than left.  2.Nonobstructive bowel gas pattern. Mild to moderate stool burden present.          Electronically Signed: Jacquelyn Woodson MD    3/29/2025 10:02 PM EDT    Workstation ID: [id number]    XR Abdomen KUB  [[id number]] Collected: 03/29/25 2201     Updated: 03/29/25 2205    Narrative:      XR CHEST 1 VW, XR ABDOMEN KUB    Date of Exam: 3/29/2025 9:38 PM EDT    Indication: CHF exacerbation; dyspnea abdominal pain    Comparison: 6/14/2022    Findings:  Patchy airspace disease is seen within the bilateral lower lobes, right greater than left. Small bilateral pleural effusions are present, right greater than left.. No pneumothorax. The pulmonary vasculature appears within normal limits. The cardiac and   mediastinal silhouette appear unremarkable. No acute osseous abnormality identified.    No gross free air or pneumatosis though evaluation is slightly limited due to technique. There is a non obstructive bowel gas pattern. A mild to moderate stool burden is present.  No suspicious calcifications identified. No acute osseous abnormality   identified.      Impression:      Impression:  1.Bibasilar pneumonia, right greater than left with small bilateral pleural effusions, right greater than left.  2.Nonobstructive bowel gas pattern. Mild to moderate stool burden present.          Electronically Signed: Jacquelyn Woodson MD    3/29/2025 10:02 PM EDT    Workstation ID: [id number]            Assessment/Plan: This patient presents with worsening heart failure symptoms.  He  started having his symptoms of shortness of breath and lower extremity edema in September.  He has has no exertional angina.  Awaiting echocardiogram to assess EF.  If his EF is less than 50% I recommend a pursue cardiac cath.  He has severe peripheral vascular disease, diabetes, hypertension hyperlipidemia and has very high risk of ischemic heart disease.  Will start Entresto.  He is on Jardiance.  Would continue diuretic  2 multiple CAD risk factors-needs an ischemic evaluation and may proceed to cardiac cath  3 patient may have a left bundle branch block myopathy.  He has frequent PVCs which can also lead to LV dysfunction      Ramon Sarabia,  MD  03/30/25  09:14 EDT      Electronically signed by Ramon Sarabia MD at 03/30/25 1000

## 2025-03-31 NOTE — PLAN OF CARE
Goal Outcome Evaluation:  Plan of Care Reviewed With: patient        Progress: improving  Outcome Evaluation: Patient admitted to room 623 from CV at aprox 1700.  Patient A+Ox4, denies pain or SOA on room air.  TR band in place and actively removing air.  IV fluids started per order, IV ABT infusing without problems.  Will continue with current POC.

## 2025-03-31 NOTE — PROGRESS NOTES
"[facility]  IP Progress Note      Chief Complaint: Follow-up of acute HFrEF/cardiomyopathy/non-STEMI    Subjective   Denies chest pain, states that he has urinated more than 3 gallons and his breathing has remarkably improved.  No edema.  Has been feeling poorly since December with symptoms of fatigue and shortness of breath.      Objective     Blood pressure 123/82, pulse 84, temperature 97.6 °F (36.4 °C), temperature source Oral, resp. rate 16, height 172 cm (67.72\"), weight 92 kg (202 lb 13.2 oz), SpO2 93%.     Intake/Output Summary (Last 24 hours) at 3/31/2025 1257  Last data filed at 3/31/2025 1236  Gross per 24 hour   Intake 0 ml   Output 5100 ml   Net -5100 ml       Physical Exam:  General: No acute distress.   Neck: no JVD.  Chest:No respiratory distress, breath sounds are normal. No wheezes,  rhonchi or rales.  Cardiovascular: Normal S1 and S2, no murmur, gallop or rub.    Extremities: No edema.  Right BKA    Results Review:     I reviewed the patient's new clinical results.    Results from last 7 days   Lab Units 03/31/25  1204   WBC 10*3/mm3 6.52   HEMOGLOBIN g/dL 15.3   HEMATOCRIT % 47.4   PLATELETS 10*3/mm3 247     Results from last 7 days   Lab Units 03/31/25  1204   SODIUM mmol/L 143   POTASSIUM mmol/L 3.8   CHLORIDE mmol/L 98   CO2 mmol/L 29.0   BUN mg/dL 30*   CREATININE mg/dL 1.41*   CALCIUM mg/dL 9.4   BILIRUBIN mg/dL 0.8   ALK PHOS U/L 90   ALT (SGPT) U/L 16   AST (SGOT) U/L 16   GLUCOSE mg/dL 181*     Results from last 7 days   Lab Units 03/31/25  1204   SODIUM mmol/L 143   POTASSIUM mmol/L 3.8   CHLORIDE mmol/L 98   CO2 mmol/L 29.0   BUN mg/dL 30*   CREATININE mg/dL 1.41*   GLUCOSE mg/dL 181*   CALCIUM mg/dL 9.4         Lab Results   Component Value Date    CKTOTAL 37 11/22/2021    TROPONINT 41 (H) 03/30/2025     Results from last 7 days   Lab Units 03/30/25  0101   TSH uIU/mL 2.530     Results from last 7 days   Lab Units 03/30/25  0826   CHOLESTEROL mg/dL " 167   TRIGLYCERIDES mg/dL 137   HDL CHOL mg/dL 32*   LDL CHOL mg/dL 110*     ampicillin-sulbactam, 3 g, Intravenous, Q6H  aspirin, 162 mg, Oral, Daily  Diclofenac Sodium, 2 g, Topical, BID  doxycycline, 100 mg, Intravenous, Q12H  empagliflozin, 25 mg, Oral, Daily  furosemide, 40 mg, Intravenous, BID  gabapentin, 600 mg, Oral, Q8H  guaiFENesin, 600 mg, Oral, Q12H  heparin (porcine), 5,000 Units, Subcutaneous, Q8H  insulin glargine, 10 Units, Subcutaneous, Daily  Insulin Lispro, 2 Units, Subcutaneous, TID With Meals  insulin lispro, 2-7 Units, Subcutaneous, 4x Daily AC & at Bedtime  metoprolol tartrate, 25 mg, Oral, BID  pantoprazole, 40 mg, Oral, Q AM  pharmacy consult - [facility], , Not Applicable, Daily  polyethylene glycol, 17 g, Oral, Daily  Psyllium, 1 packet, Oral, Daily  rosuvastatin, 20 mg, Oral, Daily  senna-docusate sodium, 2 tablet, Oral, BID  sodium chloride, 10 mL, Intravenous, Q12H       Tele: Sinus Rythym      Assessment:  Acute HFrEF, diuresed well, improved  Cardiomyopathy, EF 36-40%, unclear etiology  Non-STEMI, most probably type II  Valvular heart disease, moderate to severe mitral regurgitation, mild aortic stenosis.  Hypertension.  Dyslipidemia.  Type 2 diabetes.  CKD.  Plan:  The patient has diuresed well, hold further diuretics.  Continue aspirin, metoprolol, Jardiance and statin.  Cardiac cath today, PCI if indicated.  The procedure was explained to the patient/family extensively. Indications, benefits, risks and alternatives were discussed. The patient understands well, and wishes to proceed.   Consider adding ARB/Entresto tomorrow if renal function remains stable.    Becky Villela MD, FAC, [facility]

## 2025-03-31 NOTE — PROGRESS NOTES
[facility] Medicine Services  PROGRESS NOTE    Patient Name: Scott Benz  : 1955  MRN: [id number]    Date of Admission: 3/29/2025  Primary Care Physician: Josh Green PA-C    Subjective   Subjective     CC:  F/U dyspnea, fatigue    HPI:  Seen this morning, no complaints. Waiting for heart cath.      Objective   Objective     Vital Signs:   Temp:  [97 °F (36.1 °C)-97.8 °F (36.6 °C)] 97.6 °F (36.4 °C)  Heart Rate:  [77-93] 77  Resp:  [16-18] 16  BP: (114-140)/(74-96) 114/74  Flow (L/min) (Oxygen Therapy):  [1] 1     Physical Exam:  Gen-no acute distress  HENT-NCAT, mucous membranes moist  CV-RRR, S1 S2 normal  Resp-CTAB, no wheezes or rales  Abd-soft, NT, ND, +BS  Ext-no edema, right AKA  Neuro-A&Ox3, no focal deficits  Skin-no rashes  Psych-appropriate mood      Results Reviewed:  LAB RESULTS:      Lab 25  1204 25  0826 25  0101 25  1005   WBC 6.52 5.61 7.50 7.54   HEMOGLOBIN 15.3 14.8 14.6 14.5   HEMATOCRIT 47.4 44.9 45.3 45.8   PLATELETS 247 228 258 262   NEUTROS ABS  --  3.17 4.31 5.28   IMMATURE GRANS (ABS)  --  0.01 0.02 0.02   LYMPHS ABS  --  1.71 2.30 1.59   MONOS ABS  --  0.55 0.68 0.52   EOS ABS  --  0.12 0.13 0.07   MCV 92.6 93.3 92.3 94.0   PROCALCITONIN  --   --  0.05  --    LACTATE  --   --  1.2  --    D DIMER QUANT  --   --  0.52  --    HSTROP T  --  41* 39*  --          Lab 25  1204 25  0826 25  0101 25  1437 25  1005   SODIUM 143 143 141  --  138   POTASSIUM 3.8 4.2 3.8  --  4.6   CHLORIDE 98 103 101  --  105   CO2 29.0 25.0 26.0  --  24.1   ANION GAP 16.0* 15.0 14.0  --  8.9   BUN 30* 26* 27*  --  30*   CREATININE 1.41* 0.95 1.09  --  1.16   EGFR 53.9* 86.6 73.5  --  68.2   GLUCOSE 181* 158* 193*  --  302*   CALCIUM 9.4 8.8 9.0  --  9.1   MAGNESIUM  --   --  2.0 2  --    HEMOGLOBIN A1C  --   --  6.90*  --   --    TSH  --   --  2.530  --   --          Lab 25  1204 25  0101 25  1005   TOTAL  PROTEIN 7.1 7.0 6.9   ALBUMIN 4.1 4.0 4.0   GLOBULIN 3.0 3.0 2.9   ALT (SGPT) 16 23 35   AST (SGOT) 16 19 32   BILIRUBIN 0.8 0.8 0.5   ALK PHOS 90 80 86   LIPASE  --  17 20         Lab 03/30/25  0826 03/30/25  0101 03/28/25  1005   PROBNP  --  2,477.0* 1,915.0*   HSTROP T 41* 39*  --          Lab 03/30/25  0826   CHOLESTEROL 167   LDL CHOL 110*   HDL CHOL 32*   TRIGLYCERIDES 137             Brief Urine Lab Results  (Last result in the past 365 days)        Color   Clarity   Blood   Leuk Est   Nitrite   Protein   CREAT   Urine HCG        03/29/25 2258 Yellow   Clear   Trace   Moderate (2+)   Negative   Negative                   Microbiology Results Abnormal       Procedure Component Value - Date/Time    Blood Culture ID, PCR - Blood, Arm, Left [[id number]]  (Abnormal) Collected: 03/30/25 0101    Lab Status: Final result Specimen: Blood from Arm, Left Updated: 03/31/25 1208     BCID, PCR Staph spp, not aureus or lugdunensis. Identification by BCID2 PCR.     BOTTLE TYPE Aerobic Bottle    Blood Culture - Blood, Arm, Left [[id number]]  (Abnormal) Collected: 03/30/25 0101    Lab Status: Preliminary result Specimen: Blood from Arm, Left Updated: 03/31/25 1028     Blood Culture Abnormal Stain     Gram Stain Aerobic Bottle Gram positive cocci in groups    Narrative:      Less than seven (7) mL's of blood was collected.  Insufficient quantity may yield false negative results.            XR Chest 1 View  Result Date: 3/31/2025  XR CHEST 1 VW Date of Exam: 3/31/2025 2:52 AM EDT Indication: DYSPNEA, ON EXERTION Comparison: 3/29/2025 Findings: Heart is enlarged. There is some mild vascular congestion. There are persistent bibasilar infiltrates that are similar to prior. Small bilateral pleural effusions are probably present as well. No pneumothorax is seen.     Impression: 1.Cardiomegaly with mild vascular congestion. 2.Bibasilar infiltrates and small pleural effusions, similar to prior. Electronically Signed: Ramon Cuenca MD   3/31/2025 5:56 AM EDT  Workstation ID: [id number]    XR Chest 1 View  Result Date: 3/30/2025  PORTABLE CHEST  3/29/2025 3:32 PM HISTORY: CHF COMPARISON:  February 3, 2025 FINDINGS: The cardiac silhouette is stable The mediastinal and hilar contours are unremarkable.  There is slight prominence of the pulmonary vasculature. There is a right base opacity, correlate for infiltrate versus atelectasis. There is no pneumothorax. The visualized osseous structures demonstrate no acute abnormalities.    Impression: Right base opacity, correlate for atelectasis versus infiltrate. Images reviewed, interpreted, and dictated by Dr. Ramon Palacio. Transcribed by Nasima JIMENEZ (R), OLIVER (R).    XR Chest 1 View  Result Date: 3/29/2025  XR CHEST 1 VW, XR ABDOMEN KUB Date of Exam: 3/29/2025 9:38 PM EDT Indication: CHF exacerbation; dyspnea abdominal pain Comparison: 6/14/2022 Findings: Patchy airspace disease is seen within the bilateral lower lobes, right greater than left. Small bilateral pleural effusions are present, right greater than left.. No pneumothorax. The pulmonary vasculature appears within normal limits. The cardiac and mediastinal silhouette appear unremarkable. No acute osseous abnormality identified. No gross free air or pneumatosis though evaluation is slightly limited due to technique. There is a non obstructive bowel gas pattern. A mild to moderate stool burden is present.  No suspicious calcifications identified. No acute osseous abnormality identified.     Impression: Impression: 1.Bibasilar pneumonia, right greater than left with small bilateral pleural effusions, right greater than left. 2.Nonobstructive bowel gas pattern. Mild to moderate stool burden present. Electronically Signed: Jacquelyn Woodson MD  3/29/2025 10:02 PM EDT  Workstation ID: [id number]    XR Abdomen KUB  Result Date: 3/29/2025  XR CHEST 1 VW, XR ABDOMEN KUB Date of Exam: 3/29/2025 9:38 PM EDT Indication: CHF exacerbation; dyspnea abdominal  pain Comparison: 6/14/2022 Findings: Patchy airspace disease is seen within the bilateral lower lobes, right greater than left. Small bilateral pleural effusions are present, right greater than left.. No pneumothorax. The pulmonary vasculature appears within normal limits. The cardiac and mediastinal silhouette appear unremarkable. No acute osseous abnormality identified. No gross free air or pneumatosis though evaluation is slightly limited due to technique. There is a non obstructive bowel gas pattern. A mild to moderate stool burden is present.  No suspicious calcifications identified. No acute osseous abnormality identified.     Impression: Impression: 1.Bibasilar pneumonia, right greater than left with small bilateral pleural effusions, right greater than left. 2.Nonobstructive bowel gas pattern. Mild to moderate stool burden present. Electronically Signed: Jacquelyn Woodson MD  3/29/2025 10:02 PM EDT  Workstation ID: [id number]      Results for orders placed during the hospital encounter of 03/29/25    Adult Transthoracic Echo Complete With Contrast if Necessary Per Protocol    Interpretation Summary    Left ventricular ejection fraction appears to be 36 - 40%.    Left ventricular wall thickness is consistent with mild to moderate concentric hypertrophy.    There is global left ventricular hypokinesis    Left ventricular diastolic function is consistent with (grade III w/high LAP) reversible restrictive pattern.    Mild aortic valve stenosis is present.    Moderate to severe mitral valve regurgitation is present with an eccentric jet noted.    There is a right pleural effusion.      Current medications:  Scheduled Meds:ampicillin-sulbactam, 3 g, Intravenous, Q6H  aspirin, 162 mg, Oral, Daily  [Transfer Hold] Diclofenac Sodium, 2 g, Topical, BID  doxycycline, 100 mg, Intravenous, Q12H  empagliflozin, 25 mg, Oral, Daily  gabapentin, 600 mg, Oral, Q8H  [Transfer Hold] guaiFENesin, 600 mg, Oral, Q12H  heparin (porcine),  5,000 Units, Subcutaneous, Q8H  insulin glargine, 10 Units, Subcutaneous, Daily  [Transfer Hold] Insulin Lispro, 2 Units, Subcutaneous, TID With Meals  insulin lispro, 2-7 Units, Subcutaneous, 4x Daily AC & at Bedtime  metoprolol tartrate, 25 mg, Oral, BID  pantoprazole, 40 mg, Oral, Q AM  [Transfer Hold] pharmacy consult - [facility], , Not Applicable, Daily  polyethylene glycol, 17 g, Oral, Daily  Psyllium, 1 packet, Oral, Daily  rosuvastatin, 20 mg, Oral, Daily  senna-docusate sodium, 2 tablet, Oral, BID  sodium chloride, 10 mL, Intravenous, Q12H      Continuous Infusions:     PRN Meds:.  acetaminophen **OR** acetaminophen **OR** acetaminophen    [Transfer Hold] albuterol    senna-docusate sodium **AND** polyethylene glycol **AND** bisacodyl **AND** bisacodyl    Calcium Replacement - Follow Nurse / BPA Driven Protocol    dextrose    dextrose    glucagon (human recombinant)    [Transfer Hold] ipratropium-albuterol    Magnesium Standard Dose Replacement - Follow Nurse / BPA Driven Protocol    nitroglycerin    Phosphorus Replacement - Follow Nurse / BPA Driven Protocol    [Transfer Hold] Polyvinyl Alcohol-Povidone PF    Potassium Replacement - Follow Nurse / BPA Driven Protocol    promethazine **OR** promethazine    sodium chloride    sodium chloride    Assessment & Plan   Assessment & Plan     Active Hospital Problems    Diagnosis  POA    **CHF exacerbation [I50.9]  Yes    Arthritis [M19.90]  Yes    Stage 2 chronic kidney disease [N18.2]  Yes    Orthopnea [R06.01]  Unknown    RLQ abdominal pain [R10.31]  Unknown    Chronic idiopathic constipation [K59.04]  Unknown    Dysuria [R30.0]  Unknown    Pneumonia of both lower lobes due to infectious organism [J18.9]  Unknown    Unilateral AKA, right [S78.111A]  Yes    Type 2 diabetes mellitus with hyperglycemia, with long-term current use of insulin [E11.65, Z79.4]  Not Applicable    Dyspnea on exertion [R06.09]  Yes    Essential hypertension [I10]  Yes    Hyperlipidemia [E78.5]   Yes    Diabetic polyneuropathy associated with type 2 diabetes mellitus [E11.42]  Yes      Resolved Hospital Problems    Diagnosis Date Resolved POA    Diabetic nephropathy [E11.21] 03/29/2025 Yes        Brief Hospital Course to date:  Scott Benz is a 69 y.o. male with hx of CHF, HTN, HLD, CKD, PVD s/p R AKA, PAD s/p fem/tib bypass graft, peripheral neuropathy, T2DM - insulin dependent, CVA, and arthritis who presented due to progressive dyspnea and fatigue. Concern for CHF exacerbation. Admitted for further management.     This patient's problems and plans were partially entered by my partner and updated as appropriate by me 03/31/25. Copied text in this note has been reviewed and is accurate as of today's date.     Acute HFrEF  - Previously had normal EF in 2022 with mild aortic stenosis  - Echo 3/30/25: EF 36-40%, grade 3 diastolic dysfunction, moderate to severe MR  - s/p IV Lasix at OSH, continue here 40 mg BID - HOLD due to bump in Cr   - Cardiology consulted, plan for [facility] today for further evaluation given new reduced EF  - Continue Jardiance  - Daily weights, strict I/O    Possible superimposed bacterial pneumonia  - CXR showing bibasilar infiltrates with small bilateral pleural effusions  - Procal, WBC normal  - Negative urine Strep and Legionella  - Blood cultures - 1 bottle with Staph, suspect contaminant  - Continue Unasyn / Doxycycline  - Continue nebs, IS/flutter valve      Elevated troponin  Prolonged QTc  Tachycardia / PVC's on EKG  - Troponins flat without acute EKG changes  - Denies chest pain     RLQ pain  Chronic constipation  Dysuria  - Continue PPI  - Continue metamucil / miralax  - Bowel regimen scheduled  - KUB with mild to moderate stool burden  - UA negative for infection      Elevated Cr  CKD 2  - Baseline Cr ~1.0-1.3  - Worse today following diuretics, will hold further doses and repeat labs in AM  - Avoid nephrotoxic meds     HTN   HLD  Hx CVA  - Continue metoprolol  - Continue aspirin /  statin     PAD / Hx of R AKA (related to osteomyelitis)  - PT / OT consult for mobility  - Previously using prosthesis but currently not able to and using wheelchair     T2DM - insulin dependent  Diabetic polyneuropathy  - HbA1c 6.9%  - Hold metformin, glimepiride  - Continue gabapentin for neuropathy  - Basal/bolus regimen + SSI, adjust as needed      Expected Discharge Location and Transportation: TBD  Expected Discharge   Expected Discharge Date: 4/2/2025; Expected Discharge Time:      VTE Prophylaxis:  Pharmacologic VTE prophylaxis orders are present.         AM-PAC 6 Clicks Score (PT): 13 (03/30/25 2000)    CODE STATUS:   Code Status and Medical Interventions: CPR (Attempt to Resuscitate); Full Support   Ordered at: 03/29/25 1987     Code Status (Patient has no pulse and is not breathing):    CPR (Attempt to Resuscitate)     Medical Interventions (Patient has pulse or is breathing):    Full Support       Imelda Ambrocio MD  03/31/25

## 2025-03-31 NOTE — THERAPY EVALUATION
Patient Name: Scott Benz  : 1955    MRN: [id number]                              Today's Date: 3/31/2025       Admit Date: 3/29/2025    Visit Dx: No diagnosis found.  Patient Active Problem List   Diagnosis    Diabetic polyneuropathy associated with type 2 diabetes mellitus    Arthritis    Stage 2 chronic kidney disease    CHF exacerbation    History of CVA (cerebrovascular accident)    Type 2 diabetes mellitus with hyperglycemia, with long-term current use of insulin    Hypoglycemia    Diabetic retinopathy    Essential hypertension    Hyperlipidemia    Osteoarthritis of knee    PAD (peripheral artery disease)    Dyspnea on exertion    Unilateral AKA, right    Orthopnea    RLQ abdominal pain    Chronic idiopathic constipation    Dysuria    Pneumonia of both lower lobes due to infectious organism     Past Medical History:   Diagnosis Date    Arthritis     with hands    CHF (congestive heart failure)     CKD (chronic kidney disease)     CVA (cerebral vascular accident)     Diabetes     H/O lower limb amputation     Hypertension     Stroke 11/10/2019     Past Surgical History:   Procedure Laterality Date    ABOVE KNEE LEG AMPUTATION      AMPUTATION DIGIT Left 2019    Procedure: excision left 4th metatarsal;  Surgeon: Shannan Kraft MD;  Location:  [facility];  Service: Orthopedics    APPENDECTOMY      INCISION AND DRAINAGE LEG Left 2019    Procedure: I&D left foot wound;  Surgeon: Shannan Kraft MD;  Location:  [facility];  Service: Orthopedics      General Information       Row Name 25 1509          OT Time and Intention    Document Type evaluation  -     Mode of Treatment occupational therapy  -       Row Name 25 1509          General Information    Patient Profile Reviewed yes  -LC     Prior Level of Function independent:;all household mobility;transfer;w/c or scooter;ADL's   pivot T/Fs to W/C, Has prosthetic but hasn't been able to use for 2 weeks due to ill fit.  -LC      Existing Precautions/Restrictions fall;other (see comments)  R AKA  -     Barriers to Rehab medically complex;previous functional deficit;physical barrier  -       Row Name 03/31/25 1509          Living Environment    Current Living Arrangements home  -     People in Home spouse  -       Row Name 03/31/25 1509          Home Main Entrance    Number of Stairs, Main Entrance other (see comments)  Ramp  -       Row Name 03/31/25 1509          Stairs Within Home, Primary    Number of Stairs, Within Home, Primary none  -       Row Name 03/31/25 1509          Cognition    Orientation Status (Cognition) oriented x 3  -       Row Name 03/31/25 1509          Safety Issues/Impairments Affecting Functional Mobility    Safety Issues Affecting Function (Mobility) awareness of need for assistance;insight into deficits/self-awareness;safety precaution awareness;safety precautions follow-through/compliance  -     Impairments Affecting Function (Mobility) balance;endurance/activity tolerance;postural/trunk control;strength  -     Comment, Safety Issues/Impairments (Mobility) VC's needed for safety awareness  -               User Key  (r) = Recorded By, (t) = Taken By, (c) = Cosigned By      Initials Name Provider Type     Yue Serna OT Occupational Therapist                     Mobility/ADL's       Row Name 03/31/25 1516          Bed Mobility    Bed Mobility scooting/bridging;supine-sit  -     Scooting/Bridging [place] (Bed Mobility) verbal cues;minimum assist (75% patient effort)  -     Supine-Sit [place] (Bed Mobility) minimum assist (75% patient effort);verbal cues  -     Assistive Device (Bed Mobility) bed rails;head of bed elevated  -     Comment, (Bed Mobility) VC's to sequence  -       Row Name 03/31/25 1516          Transfers    Transfers sit-stand transfer;bed-chair transfer  -     Comment, (Transfers) VC's for hand placement and sequencing. Completed Pivot T/Fs to recliner.   -       Row Name 03/31/25 1516          Bed-Chair Transfer    Bed-Chair [place] (Transfers) verbal cues;minimum assist (75% patient effort)  -       Row Name 03/31/25 1516          Sit-Stand Transfer    Sit-Stand [place] (Transfers) minimum assist (75% patient effort);verbal cues  -     Assistive Device (Sit-Stand Transfers) walker, front-wheeled  -       Row Name 03/31/25 1516          Activities of Daily Living    BADL Assessment/Intervention lower body dressing;grooming  -[facility] Name 03/31/25 1516          Lower Body Dressing Assessment/Training    [place] Level (Lower Body Dressing) don;socks;dependent (less than 25% patient effort)  -     Position (Lower Body Dressing) edge of bed sitting  -[facility] Name 03/31/25 1516          Grooming Assessment/Training    [place] Level (Grooming) wash face, hands;hair care, combing/brushing;set up  -     Position (Grooming) supported sitting  -               User Key  (r) = Recorded By, (t) = Taken By, (c) = Cosigned By      Initials Name Provider Type     Yue Serna OT Occupational Therapist                   Obj/Interventions       [facility] Name 03/31/25 1527          Sensory Assessment (Somatosensory)    Sensory Assessment (Somatosensory) UE sensation intact  -[facility] Name 03/31/25 1527          Vision Assessment/Intervention    Visual Impairment/Limitations WFL  -[facility] Name 03/31/25 1527          Range of Motion Comprehensive    General Range of Motion bilateral upper extremity ROM WFL  -[facility] Name 03/31/25 1527          Strength Comprehensive (MMT)    General Manual Muscle Testing (MMT) Assessment upper extremity strength deficits identified  -[facility] Name 03/31/25 1527          Upper Extremity (Manual Muscle Testing)    Upper Extremity: Manual Muscle Testing (MMT) left UE strength is WFL;right UE strength is WFL  -[facility] Name 03/31/25 1527          Motor Skills    Motor Skills functional  endurance  -     Functional Endurance impaired activity tolerance  -       Row Name 03/31/25 1527          Balance    Balance Assessment sitting static balance;sitting dynamic balance;standing static balance;standing dynamic balance  -     Static Sitting Balance contact guard;verbal cues  -     Dynamic Sitting Balance contact guard;verbal cues  -     Position, Sitting Balance unsupported;sitting edge of bed  -     Static Standing Balance verbal cues;minimal assist  -     Dynamic Standing Balance verbal cues;minimal assist  -     Position/Device Used, Standing Balance supported;walker, front-wheeled  -     Balance Interventions sitting;standing;sit to stand;supported;occupation based/functional task;weight shifting activity  -     Comment, Balance VC's for upright posture  -               User Key  (r) = Recorded By, (t) = Taken By, (c) = Cosigned By      Initials Name Provider Type     Yue Serna OT Occupational Therapist                   Goals/Plan       Row Name 03/31/25 1535          Transfer Goal 1 (OT)    Activity/Assistive Device (Transfer Goal 1, OT) sit-to-stand/stand-to-sit;bed-to-chair/chair-to-bed;toilet  -     [place] Level/Cues Needed (Transfer Goal 1, OT) contact guard required  -     Time Frame (Transfer Goal 1, OT) long term goal (LTG);10 days  -     Progress/Outcome (Transfer Goal 1, OT) goal ongoing  -       Row Name 03/31/25 1535          Dressing Goal 1 (OT)    Activity/Device (Dressing Goal 1, OT) lower body dressing  -     [place]/Cues Needed (Dressing Goal 1, OT) moderate assist (50-74% patient effort)  -     Time Frame (Dressing Goal 1, OT) short term goal (STG);5 days  -     Progress/Outcome (Dressing Goal 1, OT) goal ongoing  -       Row Name 03/31/25 1535          Toileting Goal 1 (OT)    Activity/Device (Toileting Goal 1, OT) adjust/manage clothing;perform perineal hygiene;commode;grab bar/safety frame  -     [place]  Level/Cues Needed (Toileting Goal 1, OT) contact guard required  -     Time Frame (Toileting Goal 1, OT) long term goal (LTG);10 days  -     Progress/Outcome (Toileting Goal 1, OT) goal ongoing  -       Row Name 03/31/25 1534          Therapy Assessment/Plan (OT)    Planned Therapy Interventions (OT) activity tolerance training;adaptive equipment training;BADL retraining;functional balance retraining;occupation/activity based interventions;patient/caregiver education/training;strengthening exercise;transfer/mobility retraining  -               User Key  (r) = Recorded By, (t) = Taken By, (c) = Cosigned By      Initials Name Provider Type     Yue Serna, LEO Occupational Therapist                   Clinical Impression       Row Name 03/31/25 1530          Pain Assessment    Pretreatment Pain Rating 0/10 - no pain  -     Posttreatment Pain Rating 0/10 - no pain  -       Row Name 03/31/25 1530          Plan of Care Review    Plan of Care Reviewed With patient  -     Progress no change  -     Outcome Evaluation Pt. presents below baseline with ADLs and functional mobility. Limited by decreased activity tolerance, generalized weakness, and balance. Skilled OT services warranted to promote return to PLOF. Recommend IPR at discharge.  -       Row Name 03/31/25 1530          Therapy Assessment/Plan (OT)    Patient/Family Therapy Goal Statement (OT) To walk with prosthetic and take care of self  -LC     Therapy Frequency (OT) daily  -     Predicted Duration of Therapy Intervention (OT) 5 days  -       Row Name 03/31/25 1530          Therapy Plan Review/Discharge Plan (OT)    Anticipated Discharge Disposition (OT) inpatient rehabilitation facility  -       Row Name 03/31/25 8817          Vital Signs    Pre Systolic BP Rehab 123  -LC     Pre Treatment Diastolic BP 82  -LC     Post Systolic BP Rehab 133  -LC     Post Treatment Diastolic BP 57  -LC     Pre Patient Position Supine  -LC     Post  Patient Position Sitting  -       Row Name 03/31/25 1530          Positioning and Restraints    Pre-Treatment Position in bed  -     Post Treatment Position chair  -     In Chair notified nsg;reclined;call light within reach;encouraged to call for assist;exit alarm on;waffle cushion;legs elevated  -               User Key  (r) = Recorded By, (t) = Taken By, (c) = Cosigned By      Initials Name Provider Type    Yue Lin OT Occupational Therapist                   Outcome Measures       Row Name 03/31/25 1535          How much help from another is currently needed...    Putting on and taking off regular lower body clothing? 2  -LC     Bathing (including washing, rinsing, and drying) 2  -LC     Toileting (which includes using toilet bed pan or urinal) 2  -LC     Putting on and taking off regular upper body clothing 3  -LC     Taking care of personal grooming (such as brushing teeth) 3  -LC     Eating meals 4  -     AM-PAC 6 Clicks Score (OT) 16  -       Row Name 03/31/25 1535          Functional Assessment    Outcome Measure Options AM-PAC 6 Clicks Daily Activity (OT)  -               User Key  (r) = Recorded By, (t) = Taken By, (c) = Cosigned By      Initials Name Provider Type    Yue Lin OT Occupational Therapist                    Occupational Therapy Education       Title: PT OT SLP Therapies (In Progress)       Topic: Occupational Therapy (In Progress)       Point: ADL training (In Progress)       Learning Progress Summary            Patient Acceptance, E, NR by  at 3/31/2025 1404                      Point: Precautions (In Progress)       Learning Progress Summary            Patient Acceptance, E, NR by  at 3/31/2025 1404                      Point: Body mechanics (In Progress)       Learning Progress Summary            Patient Acceptance, E, NR by  at 3/31/2025 1404                                      User Key       Initials Effective Dates Name Provider Type Discipline     [facility] 06/16/21 -  Yue Serna OT Occupational Therapist OT                  OT Recommendation and Plan  Planned Therapy Interventions (OT): activity tolerance training, adaptive equipment training, BADL retraining, functional balance retraining, occupation/activity based interventions, patient/caregiver education/training, strengthening exercise, transfer/mobility retraining  Therapy Frequency (OT): daily  Plan of Care Review  Plan of Care Reviewed With: patient  Progress: no change  Outcome Evaluation: Pt. presents below baseline with ADLs and functional mobility. Limited by decreased activity tolerance, generalized weakness, and balance. Skilled OT services warranted to promote return to PLOF. Recommend IPR at discharge.     Time Calculation:   Evaluation Complexity (OT)  Review Occupational Profile/Medical/Therapy History Complexity: brief/low complexity  Assessment, Occupational Performance/Identification of Deficit Complexity: 1-3 performance deficits  Clinical Decision Making Complexity (OT): problem focused assessment/low complexity  Overall Complexity of Evaluation (OT): low complexity     Time Calculation- OT       Row Name 03/31/25 1536             Time Calculation- OT    OT Start Time 1404  -LC      OT Received On 03/31/25  -      OT Goal Re-Cert Due Date 04/10/25  -         Untimed Charges    OT Eval/Re-eval Minutes 53  -LC         Total Minutes    Untimed Charges Total Minutes 53  -LC       Total Minutes 53  -LC                User Key  (r) = Recorded By, (t) = Taken By, (c) = Cosigned By      Initials Name Provider Type     Yue Serna OT Occupational Therapist                  Therapy Charges for Today       Code Description Service Date Service Provider Modifiers Qty    12121031234 HC OT EVAL LOW COMPLEXITY 4 3/31/2025 Yue Serna OT GO 1                 Yue Serna OT  3/31/2025

## 2025-03-31 NOTE — CASE MANAGEMENT/SOCIAL WORK
Discharge Planning Assessment  [facility]     Patient Name: Scott Benz  MRN: [id number]  Today's Date: 3/31/2025    Admit Date: 3/29/2025    Plan: Home   Discharge Needs Assessment       Row Name 03/31/25 1123       Living Environment    People in Home spouse    Current Living Arrangements home    Potentially Unsafe Housing Conditions none    Primary Care Provided by self    Provides Primary Care For no one    Family Caregiver if Needed spouse    Quality of Family Relationships unable to assess    Able to Return to Prior Arrangements yes       Resource/Environmental Concerns    Resource/Environmental Concerns none    Transportation Concerns none       Transition Planning    Patient/Family Anticipates Transition to home with help/services    Patient/Family Anticipated Services at Transition ;rehabilitation services    Transportation Anticipated family or friend will provide       Discharge Needs Assessment    Readmission Within the Last 30 Days no previous admission in last 30 days    Equipment Currently Used at Home wheelchair;walker, rolling;other (see comments)    Concerns to be Addressed discharge planning    Anticipated Changes Related to Illness none                   Discharge Plan       Row Name 03/31/25 1124       Plan    Plan Home    Patient/Family in Agreement with Plan yes    Plan Comments Spoke with patient in room to initiate discharge planning.  He lives with his wife in [place].  Prior to admission, he was independent with ADL's, using either a rolling walker/wheelchair with his prothesis.  He has no other DME at home and is not current with home health.  His PCP is Josh Green.  He does not have an advanced directive.  Verified that he has [place] [employer] supplement.  Mr. Benz has RX coverage and has his scripts filled at [facility].  His discharge plan is undetermined at this time.  Will await therapy recommendations to determine proper discharge placement.  Patient is  interested in a new wheelchair if he discharges home.  CM will continue to follow.    Final Discharge Disposition Code 01 - home or self-care                    Expected Discharge Date and Time       Expected Discharge Date Expected Discharge Time    Apr 1, 2025            Demographic Summary       Row Name 03/31/25 1123       General Information    Admission Type inpatient    Arrived From emergency department    Referral Source admission list    Reason for Consult discharge planning    Preferred Language English                   Functional Status       Row Name 03/31/25 1123       Functional Status    Usual Activity Tolerance fair    Current Activity Tolerance fair       Functional Status, IADL    Medications assistive equipment    Meal Preparation assistive equipment    Housekeeping assistive equipment and person    Laundry assistive equipment and person    Shopping assistive equipment and person                   Psychosocial    No documentation.                  Abuse/Neglect    No documentation.                  Legal    No documentation.                  Substance Abuse    No documentation.                  Patient Forms    No documentation.                     Nasima Lundberg RN

## 2025-04-01 ENCOUNTER — TELEPHONE (OUTPATIENT)
Dept: FAMILY MEDICINE CLINIC | Facility: CLINIC | Age: 70
End: 2025-04-01
Payer: COMMERCIAL

## 2025-04-01 LAB
ANION GAP SERPL CALCULATED.3IONS-SCNC: 14 MMOL/L (ref 5–15)
BACTERIA SPEC AEROBE CULT: ABNORMAL
BUN SERPL-MCNC: 28 MG/DL (ref 8–23)
BUN/CREAT SERPL: 19.7 (ref 7–25)
CALCIUM SPEC-SCNC: 8.5 MG/DL (ref 8.6–10.5)
CHLORIDE SERPL-SCNC: 101 MMOL/L (ref 98–107)
CO2 SERPL-SCNC: 26 MMOL/L (ref 22–29)
CREAT SERPL-MCNC: 1.42 MG/DL (ref 0.76–1.27)
DEPRECATED RDW RBC AUTO: 49.9 FL (ref 37–54)
EGFRCR SERPLBLD CKD-EPI 2021: 53.5 ML/MIN/1.73
ERYTHROCYTE [DISTWIDTH] IN BLOOD BY AUTOMATED COUNT: 14.6 % (ref 12.3–15.4)
GLUCOSE BLDC GLUCOMTR-MCNC: 178 MG/DL (ref 70–130)
GLUCOSE BLDC GLUCOMTR-MCNC: 187 MG/DL (ref 70–130)
GLUCOSE BLDC GLUCOMTR-MCNC: 226 MG/DL (ref 70–130)
GLUCOSE BLDC GLUCOMTR-MCNC: 232 MG/DL (ref 70–130)
GLUCOSE BLDC GLUCOMTR-MCNC: 262 MG/DL (ref 70–130)
GLUCOSE SERPL-MCNC: 174 MG/DL (ref 65–99)
GRAM STN SPEC: ABNORMAL
HCT VFR BLD AUTO: 44.6 % (ref 37.5–51)
HGB BLD-MCNC: 14.4 G/DL (ref 13–17.7)
ISOLATED FROM: ABNORMAL
MCH RBC QN AUTO: 30.1 PG (ref 26.6–33)
MCHC RBC AUTO-ENTMCNC: 32.3 G/DL (ref 31.5–35.7)
MCV RBC AUTO: 93.1 FL (ref 79–97)
PLATELET # BLD AUTO: 242 10*3/MM3 (ref 140–450)
PMV BLD AUTO: 10.7 FL (ref 6–12)
POTASSIUM SERPL-SCNC: 3.8 MMOL/L (ref 3.5–5.2)
POTASSIUM SERPL-SCNC: 4 MMOL/L (ref 3.5–5.2)
POTASSIUM SERPL-SCNC: 4.6 MMOL/L (ref 3.5–5.2)
RBC # BLD AUTO: 4.79 10*6/MM3 (ref 4.14–5.8)
SODIUM SERPL-SCNC: 141 MMOL/L (ref 136–145)
WBC NRBC COR # BLD AUTO: 7.28 10*3/MM3 (ref 3.4–10.8)

## 2025-04-01 PROCEDURE — 99232 SBSQ HOSP IP/OBS MODERATE 35: CPT | Performed by: INTERNAL MEDICINE

## 2025-04-01 PROCEDURE — 63710000001 INSULIN GLARGINE PER 5 UNITS: Performed by: INTERNAL MEDICINE

## 2025-04-01 PROCEDURE — 25010000002 HEPARIN (PORCINE) PER 1000 UNITS: Performed by: INTERNAL MEDICINE

## 2025-04-01 PROCEDURE — 63710000001 INSULIN LISPRO (HUMAN) PER 5 UNITS: Performed by: INTERNAL MEDICINE

## 2025-04-01 PROCEDURE — 25010000002 DOXYCYCLINE 100 MG RECONSTITUTED SOLUTION 1 EACH VIAL: Performed by: INTERNAL MEDICINE

## 2025-04-01 PROCEDURE — 80048 BASIC METABOLIC PNL TOTAL CA: CPT | Performed by: HOSPITALIST

## 2025-04-01 PROCEDURE — 25010000002 AMPICILLIN-SULBACTAM PER 1.5 G: Performed by: INTERNAL MEDICINE

## 2025-04-01 PROCEDURE — 84132 ASSAY OF SERUM POTASSIUM: CPT | Performed by: HOSPITALIST

## 2025-04-01 PROCEDURE — 85027 COMPLETE CBC AUTOMATED: CPT | Performed by: HOSPITALIST

## 2025-04-01 PROCEDURE — 82948 REAGENT STRIP/BLOOD GLUCOSE: CPT

## 2025-04-01 RX ORDER — POTASSIUM CHLORIDE 1500 MG/1
20 TABLET, EXTENDED RELEASE ORAL ONCE
Status: DISCONTINUED | OUTPATIENT
Start: 2025-04-01 | End: 2025-04-01

## 2025-04-01 RX ORDER — POTASSIUM CHLORIDE 1500 MG/1
20 TABLET, EXTENDED RELEASE ORAL ONCE
Status: COMPLETED | OUTPATIENT
Start: 2025-04-01 | End: 2025-04-01

## 2025-04-01 RX ORDER — BUMETANIDE 1 MG/1
0.5 TABLET ORAL DAILY
Status: DISCONTINUED | OUTPATIENT
Start: 2025-04-01 | End: 2025-04-03 | Stop reason: HOSPADM

## 2025-04-01 RX ADMIN — ASPIRIN 162 MG: 325 TABLET ORAL at 08:27

## 2025-04-01 RX ADMIN — DOXYCYCLINE 100 MG: 100 INJECTION, POWDER, LYOPHILIZED, FOR SOLUTION INTRAVENOUS at 11:40

## 2025-04-01 RX ADMIN — AMPICILLIN SODIUM AND SULBACTAM SODIUM 3 G: 2; 1 INJECTION, POWDER, FOR SOLUTION INTRAMUSCULAR; INTRAVENOUS at 17:10

## 2025-04-01 RX ADMIN — GUAIFENESIN 600 MG: 600 TABLET ORAL at 21:18

## 2025-04-01 RX ADMIN — SENNOSIDES AND DOCUSATE SODIUM 2 TABLET: 50; 8.6 TABLET ORAL at 21:18

## 2025-04-01 RX ADMIN — PANTOPRAZOLE SODIUM 40 MG: 40 TABLET, DELAYED RELEASE ORAL at 05:36

## 2025-04-01 RX ADMIN — AVOBENZONE, HOMOSALATE, OCTISALATE, OCTOCRYLENE, AND OXYBENZONE 1 PACKET: 29.4; 147; 49; 25.4; 58.8 LOTION TOPICAL at 08:26

## 2025-04-01 RX ADMIN — BUMETANIDE 0.5 MG: 1 TABLET ORAL at 09:03

## 2025-04-01 RX ADMIN — SENNOSIDES AND DOCUSATE SODIUM 2 TABLET: 50; 8.6 TABLET ORAL at 08:26

## 2025-04-01 RX ADMIN — AMPICILLIN SODIUM AND SULBACTAM SODIUM 3 G: 2; 1 INJECTION, POWDER, FOR SOLUTION INTRAMUSCULAR; INTRAVENOUS at 11:41

## 2025-04-01 RX ADMIN — SACUBITRIL AND VALSARTAN 1 TABLET: 24; 26 TABLET, FILM COATED ORAL at 21:18

## 2025-04-01 RX ADMIN — ROSUVASTATIN CALCIUM 20 MG: 20 TABLET, FILM COATED ORAL at 08:26

## 2025-04-01 RX ADMIN — INSULIN LISPRO 3 UNITS: 100 INJECTION, SOLUTION INTRAVENOUS; SUBCUTANEOUS at 11:40

## 2025-04-01 RX ADMIN — EMPAGLIFLOZIN 25 MG: 25 TABLET, FILM COATED ORAL at 08:26

## 2025-04-01 RX ADMIN — GABAPENTIN 600 MG: 300 CAPSULE ORAL at 13:10

## 2025-04-01 RX ADMIN — METOPROLOL TARTRATE 25 MG: 25 TABLET, FILM COATED ORAL at 08:26

## 2025-04-01 RX ADMIN — AMPICILLIN SODIUM AND SULBACTAM SODIUM 3 G: 2; 1 INJECTION, POWDER, FOR SOLUTION INTRAMUSCULAR; INTRAVENOUS at 05:35

## 2025-04-01 RX ADMIN — DICLOFENAC SODIUM 2 G: 10 GEL TOPICAL at 08:31

## 2025-04-01 RX ADMIN — POLYETHYLENE GLYCOL 3350 17 G: 17 POWDER, FOR SOLUTION ORAL at 08:26

## 2025-04-01 RX ADMIN — INSULIN LISPRO 2 UNITS: 100 INJECTION, SOLUTION INTRAVENOUS; SUBCUTANEOUS at 11:43

## 2025-04-01 RX ADMIN — INSULIN LISPRO 2 UNITS: 100 INJECTION, SOLUTION INTRAVENOUS; SUBCUTANEOUS at 08:28

## 2025-04-01 RX ADMIN — METOPROLOL TARTRATE 25 MG: 25 TABLET, FILM COATED ORAL at 21:18

## 2025-04-01 RX ADMIN — INSULIN LISPRO 2 UNITS: 100 INJECTION, SOLUTION INTRAVENOUS; SUBCUTANEOUS at 21:25

## 2025-04-01 RX ADMIN — HEPARIN SODIUM 5000 UNITS: 5000 INJECTION INTRAVENOUS; SUBCUTANEOUS at 13:10

## 2025-04-01 RX ADMIN — HEPARIN SODIUM 5000 UNITS: 5000 INJECTION INTRAVENOUS; SUBCUTANEOUS at 05:36

## 2025-04-01 RX ADMIN — INSULIN GLARGINE 10 UNITS: 100 INJECTION, SOLUTION SUBCUTANEOUS at 08:27

## 2025-04-01 RX ADMIN — GABAPENTIN 600 MG: 300 CAPSULE ORAL at 21:17

## 2025-04-01 RX ADMIN — Medication 10 ML: at 08:30

## 2025-04-01 RX ADMIN — HEPARIN SODIUM 5000 UNITS: 5000 INJECTION INTRAVENOUS; SUBCUTANEOUS at 21:17

## 2025-04-01 RX ADMIN — SACUBITRIL AND VALSARTAN 1 TABLET: 24; 26 TABLET, FILM COATED ORAL at 09:03

## 2025-04-01 RX ADMIN — CLOPIDOGREL BISULFATE 75 MG: 75 TABLET, FILM COATED ORAL at 08:27

## 2025-04-01 RX ADMIN — POTASSIUM CHLORIDE 20 MEQ: 20 TABLET, EXTENDED RELEASE ORAL at 05:57

## 2025-04-01 RX ADMIN — Medication 10 ML: at 21:20

## 2025-04-01 RX ADMIN — BISACODYL 5 MG: 5 TABLET, COATED ORAL at 08:26

## 2025-04-01 RX ADMIN — GABAPENTIN 600 MG: 300 CAPSULE ORAL at 05:36

## 2025-04-01 RX ADMIN — GUAIFENESIN 600 MG: 600 TABLET ORAL at 08:26

## 2025-04-01 RX ADMIN — INSULIN LISPRO 2 UNITS: 100 INJECTION, SOLUTION INTRAVENOUS; SUBCUTANEOUS at 17:11

## 2025-04-01 RX ADMIN — INSULIN LISPRO 3 UNITS: 100 INJECTION, SOLUTION INTRAVENOUS; SUBCUTANEOUS at 17:11

## 2025-04-01 NOTE — CASE MANAGEMENT/SOCIAL WORK
Continued Stay Note  [facility]     Patient Name: Scott Benz  MRN: [id number]  Today's Date: 4/1/2025    Admit Date: 3/29/2025    Plan: Rehab   Discharge Plan       Row Name 04/01/25 1112       Plan    Plan Rehab    Plan Comments Pt had heart cath yesterday. Per discussion in MDR, Pt is receiving bowel regimen and is on room air. I spoke with Pt, in room. Therapy recommended rehab, and Pt is agreeable. At his request, a referral was called to April with [facility]. CM will continue to follow for medical readiness.    Final Discharge Disposition Code 62 - inpatient rehab facility                   Discharge Codes    No documentation.                 Expected Discharge Date and Time       Expected Discharge Date Expected Discharge Time    Apr 2, 2025               Viktoria Contreras RN

## 2025-04-01 NOTE — PLAN OF CARE
Goal Outcome Evaluation:  Plan of Care Reviewed With: patient        Progress: improving  Outcome Evaluation: Patient A+Ox4, has denied SOA on room air, denies pain as well.  VSS, NSR with multiple PVCs noted.  Dr. Joseph made aware of the PVCs and there are no new orders.  Pts K level 4.6 after it was replaced per protocol.  IV ABT continues without problems.  Schedule and PRN medications given for constipation, results pending.  Will continue with current POC.

## 2025-04-01 NOTE — PROGRESS NOTES
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  States he feels better today.  He specifically states he has more energy and feels like he can take a deep breath.  He went want to know when he could go home    Past medical, surgical, social and family history reviewed in the patient's electronic medical record.    Objective     Vital Sign Min/Max for last 24 hours  Temp  Min: 97.1 °F (36.2 °C)  Max: 97.6 °F (36.4 °C)   BP  Min: 111/75  Max: 134/96   Pulse  Min: 76  Max: 94   Resp  Min: 16  Max: 18   SpO2  Min: 90 %  Max: 98 %   Flow (L/min) (Oxygen Therapy)  Min: 2  Max: 2      Intake/Output Summary (Last 24 hours) at 4/1/2025 0806  Last data filed at 4/1/2025 0503  Gross per 24 hour   Intake 500 ml   Output 3275 ml   Net -2775 ml             Current Facility-Administered Medications:     acetaminophen (TYLENOL) tablet 650 mg, 650 mg, Oral, Q4H PRN **OR** acetaminophen (TYLENOL) 160 MG/5ML oral solution 650 mg, 650 mg, Oral, Q4H PRN **OR** acetaminophen (TYLENOL) suppository 650 mg, 650 mg, Rectal, Q4H PRN, Becky Villela MD    albuterol (PROVENTIL) nebulizer solution 0.083% 2.5 mg/3mL, 2.5 mg, Nebulization, Q4H PRN, Becky Villela MD    ampicillin-sulbactam (UNASYN) 3 g in sodium chloride 0.9 % 100 mL MBP, 3 g, Intravenous, Q6H, Becky Villela MD, 3 g at 04/01/25 0535    aspirin tablet 162 mg, 162 mg, Oral, Daily, Becky Villela MD, 162 mg at 03/31/25 1000    sennosides-docusate (PERICOLACE) 8.6-50 MG per tablet 2 tablet, 2 tablet, Oral, BID, 2 tablet at 03/31/25 2154 **AND** polyethylene glycol (MIRALAX) packet 17 g, 17 g, Oral, Daily PRN, 17 g at 03/31/25 2229 **AND** bisacodyl (DULCOLAX) EC tablet 5 mg, 5 mg, Oral, Daily PRN **AND** bisacodyl (DULCOLAX) suppository  10 mg, 10 mg, Rectal, Daily PRN, Becyk Villela MD    Calcium Replacement - Follow Nurse / BPA Driven Protocol, , Not Applicable, PRN, Becky Villela MD    clopidogrel (PLAVIX) tablet 75 mg, 75 mg, Oral, Daily, Becyk Villela MD    dextrose (D50W) (25 g/50 mL) IV injection 25 g, 25 g, Intravenous, Q15 Min PRN, Becky Villela MD    dextrose (GLUTOSE) oral gel 15 g, 15 g, Oral, Q15 Min PRN, Becky Villela MD    Diclofenac Sodium (VOLTAREN) 1 % gel 2 g, 2 g, Topical, BID, Becky Villela MD, 2 g at 03/31/25 1238    doxycycline (VIBRAMYCIN) 100 mg in sodium chloride 0.9 % 100 mL MBP, 100 mg, Intravenous, Q12H, Becky Villela MD, 100 mg at 03/31/25 2340    empagliflozin (JARDIANCE) tablet 25 mg, 25 mg, Oral, Daily, Becky Villela MD, 25 mg at 03/31/25 1000    gabapentin (NEURONTIN) capsule 600 mg, 600 mg, Oral, Q8H, Becky Villela MD, 600 mg at 04/01/25 0536    glucagon (GLUCAGEN) injection 1 mg, 1 mg, Intramuscular, Q15 Min PRN, Becky Villela MD    guaiFENesin (MUCINEX) 12 hr tablet 600 mg, 600 mg, Oral, Q12H, Becky Villela MD, 600 mg at 03/31/25 2154    heparin (porcine) 5000 UNIT/ML injection 5,000 Units, 5,000 Units, Subcutaneous, Q8H, Becky Villela MD, 5,000 Units at 04/01/25 0536    insulin glargine (LANTUS, SEMGLEE) injection 10 Units, 10 Units, Subcutaneous, Daily, Becky Villela MD, 10 Units at 03/31/25 1000    Insulin Lispro (humaLOG) injection 2 Units, 2 Units, Subcutaneous, TID With Meals, Becky Villela MD, 2 Units at 03/31/25 1755    Insulin Lispro (humaLOG) injection 2-7 Units, 2-7 Units, Subcutaneous, 4x Daily AC & at Bedtime, Becky Villela MD, 5 Units at 03/31/25 2155    ipratropium-albuterol (DUO-NEB) nebulizer solution 3 mL, 3 mL, Nebulization, Q4H PRN, Becky Villela MD    Magnesium Standard Dose Replacement - Follow Nurse / BPA Driven Protocol, , Not Applicable, PRN, Becky Villela MD    metoprolol tartrate (LOPRESSOR) tablet 25 mg, 25 mg, Oral, BID, Becky Villela MD, 25 mg at 03/31/25 2154    nitroglycerin  (NITROSTAT) SL tablet 0.4 mg, 0.4 mg, Sublingual, Q5 Min PRN, Becky Villela MD    pantoprazole (PROTONIX) EC tablet 40 mg, 40 mg, Oral, Q AM, Becky Villela MD, 40 mg at 04/01/25 0536    Pharmacy Consult - [place], , Not Applicable, Daily, Becky Villela MD    Phosphorus Replacement - Follow Nurse / BPA Driven Protocol, , Not Applicable, PRN, Becky Villela MD    polyethylene glycol (MIRALAX) packet 17 g, 17 g, Oral, Daily, Becky Villela MD, 17 g at 03/31/25 1000    Polyvinyl Alcohol-Povidone PF (ARTIFICIAL TEARS) 1.4-0.6 % ophthalmic solution 2 drop, 2 drop, Both Eyes, Q1H PRN, Becky Villela MD    Potassium Replacement - Follow Nurse / BPA Driven Protocol, , Not Applicable, PRN, Becky Villela MD    promethazine (PHENERGAN) tablet 12.5 mg, 12.5 mg, Oral, Q6H PRN **OR** promethazine (PHENERGAN) suppository 12.5 mg, 12.5 mg, Rectal, Q6H PRN, Becky Villela MD    Psyllium (METAMUCIL MULTIHEALTH FIBER) 51.7 % packet 1 packet, 1 packet, Oral, Daily, Becky Villela MD, 1 packet at 03/31/25 1000    rosuvastatin (CRESTOR) tablet 20 mg, 20 mg, Oral, Daily, Becky Villela MD, 20 mg at 03/31/25 1000    sacubitril-valsartan (ENTRESTO) 24-26 MG tablet 1 tablet, 1 tablet, Oral, Q12H, Ramon Sarabia MD    sodium chloride 0.9 % flush 10 mL, 10 mL, Intravenous, Q12H, Becky Villela MD, 10 mL at 03/31/25 2155    sodium chloride 0.9 % flush 10 mL, 10 mL, Intravenous, PRN, Becky Villela MD    sodium chloride 0.9 % infusion 40 mL, 40 mL, Intravenous, PRN, Becky Villela MD    Physical Exam: General Pleasant male in bed not dyspneic not tachypneic systolic blood pressure 130        HEENT: No JVP.  No icterus       Respiratory: Equal bilateral symmetrical expansion with sparse crackles.  No rhonchi or wheezing       Cardiovascular: Regular rate and rhythm with a grade 3 over systolic ejection murmur and no edema to palpation       GI: Soft and flat       Lower Extremities: Status post previous right amputation       Neuro: Facial expression  symmetrical moves all 4 extremities       Skin: Warm and dry/no edema       Psych: Pleasant affect    Results Review: Patient is a net -9.1 L.  GFR is the same today as yesterday.  Creatinine 1.42 was 1.41 yesterday.  Potassium 3.8    Radiology Results:  Imaging Results (Last 72 Hours)       Procedure Component Value Units Date/Time    XR Chest 1 View [[id number]] Collected: 03/31/25 0555     Updated: 03/31/25 0600    Narrative:      XR CHEST 1 VW    Date of Exam: 3/31/2025 2:52 AM EDT    Indication: DYSPNEA, ON EXERTION    Comparison: 3/29/2025    Findings:  Heart is enlarged. There is some mild vascular congestion. There are persistent bibasilar infiltrates that are similar to prior. Small bilateral pleural effusions are probably present as well. No pneumothorax is seen.      Impression:      1.Cardiomegaly with mild vascular congestion.  2.Bibasilar infiltrates and small pleural effusions, similar to prior.        Electronically Signed: Ramon Cuenca MD    3/31/2025 5:56 AM EDT    Workstation ID: [id number]    XR Chest 1 View [[id number]] Collected: 03/29/25 2201     Updated: 03/29/25 2205    Narrative:      XR CHEST 1 VW, XR ABDOMEN KUB    Date of Exam: 3/29/2025 9:38 PM EDT    Indication: CHF exacerbation; dyspnea abdominal pain    Comparison: 6/14/2022    Findings:  Patchy airspace disease is seen within the bilateral lower lobes, right greater than left. Small bilateral pleural effusions are present, right greater than left.. No pneumothorax. The pulmonary vasculature appears within normal limits. The cardiac and   mediastinal silhouette appear unremarkable. No acute osseous abnormality identified.    No gross free air or pneumatosis though evaluation is slightly limited due to technique. There is a non obstructive bowel gas pattern. A mild to moderate stool burden is present.  No suspicious calcifications identified. No acute osseous abnormality   identified.      Impression:      Impression:  1.Bibasilar  pneumonia, right greater than left with small bilateral pleural effusions, right greater than left.  2.Nonobstructive bowel gas pattern. Mild to moderate stool burden present.          Electronically Signed: Jacquelyn Woodson MD    3/29/2025 10:02 PM EDT    Workstation ID: [id number]    XR Abdomen KUB [[id number]] Collected: 03/29/25 2201     Updated: 03/29/25 2205    Narrative:      XR CHEST 1 VW, XR ABDOMEN KUB    Date of Exam: 3/29/2025 9:38 PM EDT    Indication: CHF exacerbation; dyspnea abdominal pain    Comparison: 6/14/2022    Findings:  Patchy airspace disease is seen within the bilateral lower lobes, right greater than left. Small bilateral pleural effusions are present, right greater than left.. No pneumothorax. The pulmonary vasculature appears within normal limits. The cardiac and   mediastinal silhouette appear unremarkable. No acute osseous abnormality identified.    No gross free air or pneumatosis though evaluation is slightly limited due to technique. There is a non obstructive bowel gas pattern. A mild to moderate stool burden is present.  No suspicious calcifications identified. No acute osseous abnormality   identified.      Impression:      Impression:  1.Bibasilar pneumonia, right greater than left with small bilateral pleural effusions, right greater than left.  2.Nonobstructive bowel gas pattern. Mild to moderate stool burden present.          Electronically Signed: Jacquelyn Woodson MD    3/29/2025 10:02 PM EDT    Workstation ID: [id number]            EKG: Sinus rhythm left bundle branch block    ECHO: EF 36 to 40% with moderate to severe MR    Tele: Sinus rhythm    Assessment   Assessment/Plan: 1 new onset heart failure multifactorial-patient is diuresed 9.1 L.  Clinically he is significantly improved.  Will start Entresto today.  Check BNP in a.m. and resume oral diuretics  2 ischemic heart disease status post stent to the ramus and the obtuse marginal.  Has disease of the right coronary artery as well  has small vessels.  DAPT for 1 year  3 valvular heart disease-repeat echo as an outpatient  4 possible left bundle branch block myopathy  Patient can be discharged from a cardiovascular point of view whenever primary team is ready  He wants to follow-up in [place] with my partners that go there so that will be either Dr. Fabricio Michelle or Dr. James [place] and can be seen 6 weeks after discharge    Recommend event monitor at time of discharge sine patient has PVC's    Ramon Sarabia MD  04/01/25  08:06 EDT

## 2025-04-01 NOTE — PROGRESS NOTES
"    [facility] Medicine Services  PROGRESS NOTE    Patient Name: Scott Benz  : 1955  MRN: [id number]    Date of Admission: 3/29/2025  Primary Care Physician: Josh Green PA-C    Subjective   Subjective     CC:  F/U dyspnea, fatigue    HPI:  Feels \"pretty good.\"  States that better than he was.  Reports that gets occasional \"mee horse\" in his leg.  Still urinating ok but less.  S/p LHC with stents yesterday.        Objective   Objective     Vital Signs:   Temp:  [97.1 °F (36.2 °C)-98.6 °F (37 °C)] 98.6 °F (37 °C)  Heart Rate:  [76-94] 91  Resp:  [16-18] 16  BP: (110-134)/(57-96) 110/66  Flow (L/min) (Oxygen Therapy):  [2] 2     Physical Exam:  Gen-no acute distress  HENT-NCAT, mucous membranes moist  CV-RRR, S1 S2 normal  Resp-CTAB, no wheezes or rales on RA  Abd-soft, NT, ND, +BS  Ext-no edema, right AKA  Neuro-A&Ox3, no focal deficits  Skin-no rashes  Psych-appropriate mood      Results Reviewed:  LAB RESULTS:      Lab 25  0418 25  1204 25  0826 25  0101 25  1005   WBC 7.28 6.52 5.61 7.50 7.54   HEMOGLOBIN 14.4 15.3 14.8 14.6 14.5   HEMATOCRIT 44.6 47.4 44.9 45.3 45.8   PLATELETS 242 247 228 258 262   NEUTROS ABS  --   --  3.17 4.31 5.28   IMMATURE GRANS (ABS)  --   --  0.01 0.02 0.02   LYMPHS ABS  --   --  1.71 2.30 1.59   MONOS ABS  --   --  0.55 0.68 0.52   EOS ABS  --   --  0.12 0.13 0.07   MCV 93.1 92.6 93.3 92.3 94.0   PROCALCITONIN  --   --   --  0.05  --    LACTATE  --   --   --  1.2  --    D DIMER QUANT  --   --   --  0.52  --    HSTROP T  --   --  41* 39*  --          Lab 25  1151 25  0418 25  2344 25  1204 25  0826 25  0101 25  1437 25  1005   SODIUM  --  141  --  143 143 141  --  138   POTASSIUM 4.6 3.8 4.0 3.8 4.2 3.8  --  4.6   CHLORIDE  --  101  --  98 103 101  --  105   CO2  --  26.0  --  29.0 25.0 26.0  --  24.1   ANION GAP  --  14.0  --  16.0* 15.0 14.0  --  8.9   BUN  --  28*  --  " 30* 26* 27*  --  30*   CREATININE  --  1.42*  --  1.41* 0.95 1.09  --  1.16   EGFR  --  53.5*  --  53.9* 86.6 73.5  --  68.2   GLUCOSE  --  174*  --  181* 158* 193*  --  302*   CALCIUM  --  8.5*  --  9.4 8.8 9.0  --  9.1   MAGNESIUM  --   --   --   --   --  2.0 2  --    HEMOGLOBIN A1C  --   --   --   --   --  6.90*  --   --    TSH  --   --   --   --   --  2.530  --   --          Lab 03/31/25  1204 03/30/25  0101 03/28/25  1005   TOTAL PROTEIN 7.1 7.0 6.9   ALBUMIN 4.1 4.0 4.0   GLOBULIN 3.0 3.0 2.9   ALT (SGPT) 16 23 35   AST (SGOT) 16 19 32   BILIRUBIN 0.8 0.8 0.5   ALK PHOS 90 80 86   LIPASE  --  17 20         Lab 03/30/25  0826 03/30/25  0101 03/28/25  1005   PROBNP  --  2,477.0* 1,915.0*   HSTROP T 41* 39*  --          Lab 03/30/25  0826   CHOLESTEROL 167   LDL CHOL 110*   HDL CHOL 32*   TRIGLYCERIDES 137             Brief Urine Lab Results  (Last result in the past 365 days)        Color   Clarity   Blood   Leuk Est   Nitrite   Protein   CREAT   Urine HCG        03/29/25 2258 Yellow   Clear   Trace   Moderate (2+)   Negative   Negative                   Microbiology Results Abnormal       Procedure Component Value - Date/Time    Blood Culture - Blood, Arm, Left [[id number]]  (Abnormal) Collected: 03/30/25 0101    Lab Status: Final result Specimen: Blood from Arm, Left Updated: 04/01/25 0611     Blood Culture Staphylococcus, coagulase negative     Isolated from Aerobic Bottle     Gram Stain Aerobic Bottle Gram positive cocci in groups    Narrative:      Probable contaminant requires clinical correlation, susceptibility not performed unless requested by physician.      Less than seven (7) mL's of blood was collected.  Insufficient quantity may yield false negative results.    Blood Culture ID, PCR - Blood, Arm, Left [[id number]]  (Abnormal) Collected: 03/30/25 0101    Lab Status: Final result Specimen: Blood from Arm, Left Updated: 03/31/25 1208     BCID, PCR Staph spp, not aureus or lugdunensis. Identification by  BCID2 PCR.     BOTTLE TYPE Aerobic Bottle            Cardiac Catheterization/Vascular Study  Result Date: 3/31/2025  FINAL     Impression: Severe diffuse multivessel disease as follows: Mild to moderate disease of the proximal to mid LAD with 70% stenosis of the distal LAD and 60% stenosis of the diagonal branch of the LAD. Total occlusion of the ramus intermedius. 90% stenosis of the ostial circumflex and 95% diffuse stenosis of the small to medium sized first marginal branch of the circumflex. 50% stenosis of the proximal RCA, 80% stenosis of the distal RCA and 80% stenosis of the right PDA. Diffuse disease of small branches of the posterolateral branch of the RCA. Successful PTCA/stenting of the ramus intermedius with 2.25 x 28 mm THOR proximally optimized to 2.5 mm reducing 100% stenosis 0%. Successful PTCA/stenting of the ostial circumflex with 2.5 x 12 mm THOR reducing 90% stenosis 0%. Successful balloon PTCA of the marginal branch of the circumflex with 2.0 mm balloon reducing diffuse 90% stenosis to less than 40 to 50%. RECOMMENDATIONS: Dual antiplatelet therapy for 1 year. Optimize medical therapy and risk factor management. Consideration of staged PCI of the RCA and the LAD. Indications: Non-STEMI/acute HFrEF. Access: Right radial. Procedures: Left heart catheterization. Left ventriculogram. Selective coronary angiography. Arterial site hemostasis with radial band. Procedure narrative: The patient was brought to the catheterization lab in a fasting condition.  Access site was prepped and draped in standard sterile fashion.  Lidocaine was injected and arterial access was obtained by percutaneous anterior wall puncture technique.  A 6 Lao arterial sheath was placed. Above procedures were performed without complications.  Following the angiograms Angiomax was started.  The left coronary artery was engaged with EBU 3 guide catheter.  A Fielder XT wire was advanced into the ramus intermedius and the total  occlusion was crossed.  A versa turn wire was advanced into the circumflex.  Balloon PTCA of the ramus intermedius was performed over a long total occlusion.  Subsequently it was stented with a 2.25 x 28 mm THOR which was fully deployed and postdilated with a 2.5 mm NC balloon at higher pressures.  Satisfactory expansion was noted with 0% residual stenosis and no evidence of complications.  The mid flow was grade 0 before PCI and grade 3 after PCI.  At this time balloon PTCA of the first marginal right of the circumflex was performed with a 2.0 mm balloon.  Multiple 32nd inflations were made.  The 95% stenosis was reduced to less than 40 to 50%.  Due to small vessel caliber, diffuse disease and risk of jailing the second marginal stenting of this vessel was not performed.  At this time balloon PTCA of the ostial circumflex was performed with the same 2.0 mm balloon.  The ostium of the left circumflex was then stented with a 2.5 x 12 mm THOR which was fully deployed and postdilated.  The ramus intermedius was rewired through the stent struts and stent struts and balloon PTCA was performed.  Subsequently the circumflex stent was postdilated with 2.5 mm NC balloon at high pressures.  Satisfactory results are achieved with 0% residual stenosis at the site.  JULISSA flow was grade 3 before and after PCI in the circumflex. Finally the arterial sheath was removed and hemostasis was achieved.  The patient was transferred to the unit in a stable condition. Angiographic Findings: Right coronary dominance. LM: Mild plaque, no significant disease. LAD: Mild, 30 to 40% smooth old mid segment plaque.  The distal LAD has a 70% stenosis.  A medium size diagonal branch has a 60% mid vessel stenosis. Ramus intermedius: Medium size vessel which was totally occluded proximally.  It was successfully stented with 2.25 x 28 mm THOR which was proximally optimized to 2.5 mm and the 100% stenosis was reduced to 0%. LCX: 90% ostial stenosis which  was stented with 2.5 x 12 mm THOR and reduced to 0%.  The first marginal had a long diffuse 95% stenosis which was treated with balloon PTCA and reduced to less than 50%.  Due to small vessel caliber and long segment of disease and to avoid jailing of the second marginal stenting of this vessel was not performed.  The second marginal has mild nonobstructive plaque without occlusive disease. RCA: Dominant vessel with mild diffuse plaque from proximal to mid vessel.  There is a 40 to 50% proximal stenosis and an 80% focal distal stenosis.  The PDA has a 80% stenosis in its distal segment before reaching the apex.  The posterolateral branch gives off 3 branches.  The first branch has diffuse disease.  The second branch has mild plaque.  The third small branch has a 80% stenosis.  These small vessels are not suitable for PCI. Complications: No acute procedure related complications.     XR Chest 1 View  Result Date: 3/31/2025  XR CHEST 1 VW Date of Exam: 3/31/2025 2:52 AM EDT Indication: DYSPNEA, ON EXERTION Comparison: 3/29/2025 Findings: Heart is enlarged. There is some mild vascular congestion. There are persistent bibasilar infiltrates that are similar to prior. Small bilateral pleural effusions are probably present as well. No pneumothorax is seen.     Impression: 1.Cardiomegaly with mild vascular congestion. 2.Bibasilar infiltrates and small pleural effusions, similar to prior. Electronically Signed: Ramon Cuenca MD  3/31/2025 5:56 AM EDT  Workstation ID: [id number]      Results for orders placed during the hospital encounter of 03/29/25    Adult Transthoracic Echo Complete With Contrast if Necessary Per Protocol    Interpretation Summary    Left ventricular ejection fraction appears to be 36 - 40%.    Left ventricular wall thickness is consistent with mild to moderate concentric hypertrophy.    There is global left ventricular hypokinesis    Left ventricular diastolic function is consistent with (grade III w/high  LAP) reversible restrictive pattern.    Mild aortic valve stenosis is present.    Moderate to severe mitral valve regurgitation is present with an eccentric jet noted.    There is a right pleural effusion.      Current medications:  Scheduled Meds:ampicillin-sulbactam, 3 g, Intravenous, Q6H  aspirin, 162 mg, Oral, Daily  bumetanide, 0.5 mg, Oral, Daily  clopidogrel, 75 mg, Oral, Daily  Diclofenac Sodium, 2 g, Topical, BID  doxycycline, 100 mg, Intravenous, Q12H  empagliflozin, 25 mg, Oral, Daily  gabapentin, 600 mg, Oral, Q8H  guaiFENesin, 600 mg, Oral, Q12H  heparin (porcine), 5,000 Units, Subcutaneous, Q8H  insulin glargine, 10 Units, Subcutaneous, Daily  Insulin Lispro, 2 Units, Subcutaneous, TID With Meals  insulin lispro, 2-7 Units, Subcutaneous, 4x Daily AC & at Bedtime  metoprolol tartrate, 25 mg, Oral, BID  pantoprazole, 40 mg, Oral, Q AM  pharmacy consult - MTM, , Not Applicable, Daily  polyethylene glycol, 17 g, Oral, Daily  Psyllium, 1 packet, Oral, Daily  rosuvastatin, 20 mg, Oral, Daily  sacubitril-valsartan, 1 tablet, Oral, Q12H  senna-docusate sodium, 2 tablet, Oral, BID  sodium chloride, 10 mL, Intravenous, Q12H      Continuous Infusions:     PRN Meds:.  acetaminophen **OR** acetaminophen **OR** acetaminophen    albuterol    senna-docusate sodium **AND** polyethylene glycol **AND** bisacodyl **AND** bisacodyl    Calcium Replacement - Follow Nurse / BPA Driven Protocol    dextrose    dextrose    glucagon (human recombinant)    ipratropium-albuterol    Magnesium Standard Dose Replacement - Follow Nurse / BPA Driven Protocol    nitroglycerin    Phosphorus Replacement - Follow Nurse / BPA Driven Protocol    Polyvinyl Alcohol-Povidone PF    Potassium Replacement - Follow Nurse / BPA Driven Protocol    promethazine **OR** promethazine    sodium chloride    sodium chloride    Assessment & Plan   Assessment & Plan     Active Hospital Problems    Diagnosis  POA    **CHF exacerbation [I50.9]  Yes    Arthritis  [M19.90]  Yes    Stage 2 chronic kidney disease [N18.2]  Yes    Orthopnea [R06.01]  Unknown    RLQ abdominal pain [R10.31]  Unknown    Chronic idiopathic constipation [K59.04]  Unknown    Dysuria [R30.0]  Unknown    Pneumonia of both lower lobes due to infectious organism [J18.9]  Unknown    Unilateral AKA, right [S78.111A]  Yes    Type 2 diabetes mellitus with hyperglycemia, with long-term current use of insulin [E11.65, Z79.4]  Not Applicable    Dyspnea on exertion [R06.09]  Yes    Essential hypertension [I10]  Yes    Hyperlipidemia [E78.5]  Yes    Diabetic polyneuropathy associated with type 2 diabetes mellitus [E11.42]  Yes      Resolved Hospital Problems    Diagnosis Date Resolved POA    Diabetic nephropathy [E11.21] 03/29/2025 Yes        Brief Hospital Course to date:  Scott Benz is a 69 y.o. male with hx of CHF, HTN, HLD, CKD, PVD s/p R AKA, PAD s/p fem/tib bypass graft, peripheral neuropathy, T2DM - insulin dependent, CVA, and arthritis who presented due to progressive dyspnea and fatigue. Concern for CHF exacerbation. Admitted for further management.     This patient's problems and plans were partially entered by my partner and updated as appropriate by me 04/01/25. Copied text in this note has been reviewed and is accurate as of today's date.     Acute HFrEF  Ischemic Heart Disease  Type 2 MI 2nd to CHF  Possible LBB Myopathy  - Previously had normal EF in 2022 with mild aortic stenosis  - Echo 3/30/25: EF 36-40%, grade 3 diastolic dysfunction, moderate to severe MR  - s/p IV Lasix at OSH, continue here 40 mg BID - HOLD due to bump in Cr   - Cardiology consulted, s/p C on 3/31 with stents to the ramus and the obtuse marginal, also has disease of the RCA as well as some small vessels.  Recs DAPT x 1 year.  Recs to repeat echo as an outpatient.  Recs to f/u with Either Dr. Michelle or Dr. Weber 6 weeks after discharge at [place]  - Continue Jardiance  - Daily weights, strict I/O    Possible superimposed  bacterial pneumonia  - CXR showing bibasilar infiltrates with small bilateral pleural effusions  - Procal, WBC normal  - Negative urine Strep and Legionella  - Blood cultures - 1 bottle with Staph, suspect contaminant  - Continue Unasyn / Doxycycline  - Continue nebs, IS/flutter valve      Elevated troponin  Prolonged QTc  Tachycardia / PVC's on EKG  - Troponins flat without acute EKG changes  - Denies chest pain     RLQ pain  Chronic constipation  Dysuria  - Continue PPI  - Continue metamucil / miralax  - Bowel regimen scheduled  - KUB with mild to moderate stool burden  - UA negative for infection      Elevated Cr  CKD 2  - Baseline Cr ~1.0-1.3  - Worse  following diuretics on 3/31, stable today.  Continue to hold diuretics  - Avoid nephrotoxic meds     HTN   HLD  Hx CVA  - Continue metoprolol  - Continue aspirin / statin     PAD / Hx of R AKA (related to osteomyelitis)  - PT / OT consult for mobility  - Previously using prosthesis but currently not able to and using wheelchair     T2DM - insulin dependent  Diabetic polyneuropathy  - HbA1c 6.9%  - Hold metformin, glimepiride  - Continue gabapentin for neuropathy  - Basal/bolus regimen + SSI, adjust as needed      Expected Discharge Location and Transportation: TBD  Expected Discharge   Expected Discharge Date: 4/2/2025; Expected Discharge Time:      VTE Prophylaxis:  Pharmacologic VTE prophylaxis orders are present.         AM-PAC 6 Clicks Score (PT): 13 (04/01/25 1032)    CODE STATUS:   Code Status and Medical Interventions: CPR (Attempt to Resuscitate); Full Support   Ordered at: 03/29/25 1924     Code Status (Patient has no pulse and is not breathing):    CPR (Attempt to Resuscitate)     Medical Interventions (Patient has pulse or is breathing):    Full Support       Sumit Joseph MD  04/01/25

## 2025-04-01 NOTE — TELEPHONE ENCOUNTER
Provider:     TRUDI BARTLETT    Caller: Scott Benz    Relationship to Patient: Self    Phone Number:       [phone] (Mobile)     Reason for Call:     PATIENT STATED HE WENT TO [facility] EMERGENCY ROOM SATURDAY, 3/30 AND WAS TRANSFERRED TO [facility] TO BE ADMITTED THE SAME DAY    PATIENT ALSO STATED HE RECEIVED (2) HEART STENTS     PATIENT STATED HE WAS DIAGNOSED WITH CONGESTIVE HEART FAILURE AND PNEUMONIA    PATIENT ALSO STATED HE MAY BE TRANSFERRED TO [facility]

## 2025-04-01 NOTE — PROGRESS NOTES
Pt. Referred for Phase II Cardiac Rehab. Staff discussed benefits of exercise, program protocol, and educational material provided. Teach back verified. Patient refused cardiac rehab at this time. Patient states he most likely will be going to [facility] once discharged from hospital. Staff informed patient he has one year to use Cardiac Rehab benefits if he is able to participate in program at a later time. Teach back verified. Pt given brochure for [facility] if they have any further questions about cardiac rehab.

## 2025-04-01 NOTE — CONSULTS
Patient denied need for dm edu. Encouraged regular follow up with providers to titrate care to achieve glucose goals.

## 2025-04-02 DIAGNOSIS — R07.89 CHEST PAIN, ATYPICAL: Primary | ICD-10-CM

## 2025-04-02 LAB
ALBUMIN SERPL-MCNC: 3.6 G/DL (ref 3.5–5.2)
ANION GAP SERPL CALCULATED.3IONS-SCNC: 15 MMOL/L (ref 5–15)
BUN SERPL-MCNC: 23 MG/DL (ref 8–23)
BUN/CREAT SERPL: 20.9 (ref 7–25)
CALCIUM SPEC-SCNC: 8.6 MG/DL (ref 8.6–10.5)
CHLORIDE SERPL-SCNC: 102 MMOL/L (ref 98–107)
CO2 SERPL-SCNC: 22 MMOL/L (ref 22–29)
CREAT SERPL-MCNC: 1.1 MG/DL (ref 0.76–1.27)
EGFRCR SERPLBLD CKD-EPI 2021: 72.7 ML/MIN/1.73
GLUCOSE BLDC GLUCOMTR-MCNC: 181 MG/DL (ref 70–130)
GLUCOSE BLDC GLUCOMTR-MCNC: 185 MG/DL (ref 70–130)
GLUCOSE BLDC GLUCOMTR-MCNC: 192 MG/DL (ref 70–130)
GLUCOSE BLDC GLUCOMTR-MCNC: 193 MG/DL (ref 70–130)
GLUCOSE BLDC GLUCOMTR-MCNC: 238 MG/DL (ref 70–130)
GLUCOSE SERPL-MCNC: 183 MG/DL (ref 65–99)
NT-PROBNP SERPL-MCNC: 1093 PG/ML (ref 0–900)
PHOSPHATE SERPL-MCNC: 3 MG/DL (ref 2.5–4.5)
POTASSIUM SERPL-SCNC: 4.2 MMOL/L (ref 3.5–5.2)
SODIUM SERPL-SCNC: 139 MMOL/L (ref 136–145)

## 2025-04-02 PROCEDURE — 63710000001 INSULIN GLARGINE PER 5 UNITS: Performed by: INTERNAL MEDICINE

## 2025-04-02 PROCEDURE — 25010000002 DOXYCYCLINE 100 MG RECONSTITUTED SOLUTION 1 EACH VIAL: Performed by: INTERNAL MEDICINE

## 2025-04-02 PROCEDURE — 82948 REAGENT STRIP/BLOOD GLUCOSE: CPT

## 2025-04-02 PROCEDURE — 25010000002 AMPICILLIN-SULBACTAM PER 1.5 G: Performed by: INTERNAL MEDICINE

## 2025-04-02 PROCEDURE — 63710000001 INSULIN LISPRO (HUMAN) PER 5 UNITS: Performed by: INTERNAL MEDICINE

## 2025-04-02 PROCEDURE — 99232 SBSQ HOSP IP/OBS MODERATE 35: CPT | Performed by: NURSE PRACTITIONER

## 2025-04-02 PROCEDURE — 83880 ASSAY OF NATRIURETIC PEPTIDE: CPT | Performed by: INTERNAL MEDICINE

## 2025-04-02 PROCEDURE — 25010000002 HEPARIN (PORCINE) PER 1000 UNITS: Performed by: INTERNAL MEDICINE

## 2025-04-02 PROCEDURE — 80069 RENAL FUNCTION PANEL: CPT | Performed by: INTERNAL MEDICINE

## 2025-04-02 PROCEDURE — 99232 SBSQ HOSP IP/OBS MODERATE 35: CPT | Performed by: INTERNAL MEDICINE

## 2025-04-02 RX ORDER — DOXYCYCLINE 100 MG/1
100 CAPSULE ORAL EVERY 12 HOURS SCHEDULED
Status: DISCONTINUED | OUTPATIENT
Start: 2025-04-02 | End: 2025-04-03 | Stop reason: HOSPADM

## 2025-04-02 RX ORDER — METOPROLOL SUCCINATE 50 MG/1
50 TABLET, EXTENDED RELEASE ORAL
Status: DISCONTINUED | OUTPATIENT
Start: 2025-04-02 | End: 2025-04-03 | Stop reason: HOSPADM

## 2025-04-02 RX ADMIN — SENNOSIDES AND DOCUSATE SODIUM 2 TABLET: 50; 8.6 TABLET ORAL at 08:54

## 2025-04-02 RX ADMIN — INSULIN LISPRO 2 UNITS: 100 INJECTION, SOLUTION INTRAVENOUS; SUBCUTANEOUS at 22:21

## 2025-04-02 RX ADMIN — EMPAGLIFLOZIN 25 MG: 25 TABLET, FILM COATED ORAL at 08:54

## 2025-04-02 RX ADMIN — GABAPENTIN 600 MG: 300 CAPSULE ORAL at 13:01

## 2025-04-02 RX ADMIN — Medication 10 ML: at 08:54

## 2025-04-02 RX ADMIN — METOPROLOL SUCCINATE 50 MG: 50 TABLET, EXTENDED RELEASE ORAL at 09:06

## 2025-04-02 RX ADMIN — INSULIN GLARGINE 10 UNITS: 100 INJECTION, SOLUTION SUBCUTANEOUS at 08:53

## 2025-04-02 RX ADMIN — GABAPENTIN 600 MG: 300 CAPSULE ORAL at 22:08

## 2025-04-02 RX ADMIN — GUAIFENESIN 600 MG: 600 TABLET ORAL at 08:53

## 2025-04-02 RX ADMIN — INSULIN LISPRO 3 UNITS: 100 INJECTION, SOLUTION INTRAVENOUS; SUBCUTANEOUS at 17:35

## 2025-04-02 RX ADMIN — AMPICILLIN SODIUM AND SULBACTAM SODIUM 3 G: 2; 1 INJECTION, POWDER, FOR SOLUTION INTRAMUSCULAR; INTRAVENOUS at 12:59

## 2025-04-02 RX ADMIN — SACUBITRIL AND VALSARTAN 1 TABLET: 24; 26 TABLET, FILM COATED ORAL at 08:53

## 2025-04-02 RX ADMIN — DOXYCYCLINE 100 MG: 100 CAPSULE ORAL at 22:10

## 2025-04-02 RX ADMIN — ASPIRIN 162 MG: 325 TABLET ORAL at 08:53

## 2025-04-02 RX ADMIN — POLYETHYLENE GLYCOL 3350 17 G: 17 POWDER, FOR SOLUTION ORAL at 08:53

## 2025-04-02 RX ADMIN — AMPICILLIN SODIUM AND SULBACTAM SODIUM 3 G: 2; 1 INJECTION, POWDER, FOR SOLUTION INTRAMUSCULAR; INTRAVENOUS at 00:57

## 2025-04-02 RX ADMIN — SACUBITRIL AND VALSARTAN 1 TABLET: 24; 26 TABLET, FILM COATED ORAL at 22:08

## 2025-04-02 RX ADMIN — INSULIN LISPRO 2 UNITS: 100 INJECTION, SOLUTION INTRAVENOUS; SUBCUTANEOUS at 08:53

## 2025-04-02 RX ADMIN — GABAPENTIN 600 MG: 300 CAPSULE ORAL at 06:01

## 2025-04-02 RX ADMIN — INSULIN LISPRO 2 UNITS: 100 INJECTION, SOLUTION INTRAVENOUS; SUBCUTANEOUS at 13:00

## 2025-04-02 RX ADMIN — AMPICILLIN SODIUM AND SULBACTAM SODIUM 3 G: 2; 1 INJECTION, POWDER, FOR SOLUTION INTRAMUSCULAR; INTRAVENOUS at 06:01

## 2025-04-02 RX ADMIN — GUAIFENESIN 600 MG: 600 TABLET ORAL at 22:07

## 2025-04-02 RX ADMIN — PANTOPRAZOLE SODIUM 40 MG: 40 TABLET, DELAYED RELEASE ORAL at 06:01

## 2025-04-02 RX ADMIN — Medication 10 ML: at 22:12

## 2025-04-02 RX ADMIN — INSULIN LISPRO 2 UNITS: 100 INJECTION, SOLUTION INTRAVENOUS; SUBCUTANEOUS at 12:59

## 2025-04-02 RX ADMIN — AVOBENZONE, HOMOSALATE, OCTISALATE, OCTOCRYLENE, AND OXYBENZONE 1 PACKET: 29.4; 147; 49; 25.4; 58.8 LOTION TOPICAL at 08:53

## 2025-04-02 RX ADMIN — INSULIN LISPRO 2 UNITS: 100 INJECTION, SOLUTION INTRAVENOUS; SUBCUTANEOUS at 17:35

## 2025-04-02 RX ADMIN — AMPICILLIN SODIUM AND SULBACTAM SODIUM 3 G: 2; 1 INJECTION, POWDER, FOR SOLUTION INTRAMUSCULAR; INTRAVENOUS at 17:35

## 2025-04-02 RX ADMIN — DOXYCYCLINE 100 MG: 100 INJECTION, POWDER, LYOPHILIZED, FOR SOLUTION INTRAVENOUS at 01:40

## 2025-04-02 RX ADMIN — HEPARIN SODIUM 5000 UNITS: 5000 INJECTION INTRAVENOUS; SUBCUTANEOUS at 13:00

## 2025-04-02 RX ADMIN — DICLOFENAC SODIUM 2 G: 10 GEL TOPICAL at 00:57

## 2025-04-02 RX ADMIN — DICLOFENAC SODIUM 2 G: 10 GEL TOPICAL at 13:00

## 2025-04-02 RX ADMIN — HEPARIN SODIUM 5000 UNITS: 5000 INJECTION INTRAVENOUS; SUBCUTANEOUS at 22:07

## 2025-04-02 RX ADMIN — CLOPIDOGREL BISULFATE 75 MG: 75 TABLET, FILM COATED ORAL at 08:54

## 2025-04-02 RX ADMIN — BUMETANIDE 0.5 MG: 1 TABLET ORAL at 08:53

## 2025-04-02 RX ADMIN — ROSUVASTATIN CALCIUM 20 MG: 20 TABLET, FILM COATED ORAL at 08:53

## 2025-04-02 RX ADMIN — INSULIN LISPRO 2 UNITS: 100 INJECTION, SOLUTION INTRAVENOUS; SUBCUTANEOUS at 08:52

## 2025-04-02 RX ADMIN — HEPARIN SODIUM 5000 UNITS: 5000 INJECTION INTRAVENOUS; SUBCUTANEOUS at 06:01

## 2025-04-02 NOTE — CASE MANAGEMENT/SOCIAL WORK
Continued Stay Note  [facility]     Patient Name: Scott Benz  MRN: [id number]  Today's Date: 4/2/2025    Admit Date: 3/29/2025    Plan: [facility]   Discharge Plan [facility]    Per discussion in MDR, [place] insurance has terminated. Medicare A is now primary insurance. Spouse, Hailey, is working with employer to have policy reinstated. Per April w/[facility], if facility accepts Pt with just Medicare A, he will need to private pay for the physician portion of the bill. At this time, [facility] cannot accept him because he does not have a complaint dx (60% rule). They cannot provide an estimate of when they may be able to accept him. They accept 1 noncompliant dx to every 4 compliant dx patients. This was explained to Pt and spouse. At his request, referrals were called to Tigist with [facility] and Yue with [facility]. CM will continue to follow.                   Discharge Codes    62 - inpatient rehab facility                 Expected Discharge Date and Time       Expected Discharge Date Expected Discharge Time    Apr 3, 2025               Viktoria Contreras RN

## 2025-04-02 NOTE — H&P (VIEW-ONLY)
"[facility]  IP Progress Note      Chief Complaint: Follow-up of acute HFrEF/cardiomyopathy/non-STEMI    Subjective   Continues to diurese well, states that his breathing has remarkably improved.  No current chest pain.  Not sure whether he is going home or to rehab.  He lives in [place], states that he was followed by some home health agency called [facility] and that may have triggered the confusion that he wants to follow-up in [facility] which is far from his home.      Objective     Blood pressure 110/75, pulse 83, temperature 97.8 °F (36.6 °C), temperature source Oral, resp. rate 16, height 172 cm (67.72\"), weight 92.3 kg (203 lb 8 oz), SpO2 95%.     Intake/Output Summary (Last 24 hours) at 4/2/2025 0751  Last data filed at 4/2/2025 0454  Gross per 24 hour   Intake 1120 ml   Output 3800 ml   Net -2680 ml       Physical Exam:  General: No apparent distress.  Neck: no JVD.  Chest:No respiratory distress, breath sounds are normal. No wheezes,  rhonchi or rales.  Cardiovascular: Normal S1 and S2, 2/6 systolic ejection murmur.  Extremities: No significant left ankle edema.  Right BKA      Results Review:     I reviewed the patient's new clinical results.    Results from last 7 days   Lab Units 04/01/25  0418   WBC 10*3/mm3 7.28   HEMOGLOBIN g/dL 14.4   HEMATOCRIT % 44.6   PLATELETS 10*3/mm3 242     Results from last 7 days   Lab Units 04/02/25  0443 03/31/25  2344 03/31/25  1204   SODIUM mmol/L 139   < > 143   POTASSIUM mmol/L 4.2   < > 3.8   CHLORIDE mmol/L 102   < > 98   CO2 mmol/L 22.0   < > 29.0   BUN mg/dL 23   < > 30*   CREATININE mg/dL 1.10   < > 1.41*   CALCIUM mg/dL 8.6   < > 9.4   BILIRUBIN mg/dL  --   --  0.8   ALK PHOS U/L  --   --  90   ALT (SGPT) U/L  --   --  16   AST (SGOT) U/L  --   --  16   GLUCOSE mg/dL 183*   < > 181*    < > = values in this interval not displayed.     Results from last 7 days   Lab Units 04/02/25  0443   SODIUM mmol/L 139 "   POTASSIUM mmol/L 4.2   CHLORIDE mmol/L 102   CO2 mmol/L 22.0   BUN mg/dL 23   CREATININE mg/dL 1.10   GLUCOSE mg/dL 183*   CALCIUM mg/dL 8.6         Lab Results   Component Value Date    CKTOTAL 37 11/22/2021    TROPONINT 41 (H) 03/30/2025     Results from last 7 days   Lab Units 03/30/25  0101   TSH uIU/mL 2.530     Results from last 7 days   Lab Units 03/30/25  0826   CHOLESTEROL mg/dL 167   TRIGLYCERIDES mg/dL 137   HDL CHOL mg/dL 32*   LDL CHOL mg/dL 110*     ampicillin-sulbactam, 3 g, Intravenous, Q6H  aspirin, 162 mg, Oral, Daily  bumetanide, 0.5 mg, Oral, Daily  clopidogrel, 75 mg, Oral, Daily  Diclofenac Sodium, 2 g, Topical, BID  doxycycline, 100 mg, Intravenous, Q12H  empagliflozin, 25 mg, Oral, Daily  gabapentin, 600 mg, Oral, Q8H  guaiFENesin, 600 mg, Oral, Q12H  heparin (porcine), 5,000 Units, Subcutaneous, Q8H  insulin glargine, 10 Units, Subcutaneous, Daily  Insulin Lispro, 2 Units, Subcutaneous, TID With Meals  insulin lispro, 2-7 Units, Subcutaneous, 4x Daily AC & at Bedtime  metoprolol tartrate, 25 mg, Oral, BID  pantoprazole, 40 mg, Oral, Q AM  pharmacy consult - MTM, , Not Applicable, Daily  polyethylene glycol, 17 g, Oral, Daily  Psyllium, 1 packet, Oral, Daily  rosuvastatin, 20 mg, Oral, Daily  sacubitril-valsartan, 1 tablet, Oral, Q12H  senna-docusate sodium, 2 tablet, Oral, BID  sodium chloride, 10 mL, Intravenous, Q12H       Tele: Sinus Rythym      Assessment:  Acute HFrEF, diuresed well, improved  Cardiomyopathy, EF 36-40%, unclear etiology  Non-STEMI, most probably type II  CAD, status post THOR to the ramus intermedius and proximal circumflex with balloon PTCA of the obtuse marginal.  Has significant residual disease of distal LAD and RCA as well as right.  Valvular heart disease, moderate to severe mitral regurgitation, mild aortic stenosis.  Hypertension.  Dyslipidemia.  Type 2 diabetes.  CKD.  Plan:  Continues to diurese well, continue current dose of Bumex.  Continue aspirin/Plavix  for CAD, status post THOR.  He will be scheduled for staged PCI of the RCA and LAD in 2 to 3 weeks.  Subsequently we will arrange cardiology follow-up closer to his home which is in [place].  Continue rest of the current medical management including Entresto, Jardiance and Crestor.  Change metoprolol to Toprol-XL  Okay to transfer to rehab.  My office will contact him to schedule staged PCI procedures and further follow-up appointments will be scheduled after that.    Becky Villela MD, FACC, [facility]

## 2025-04-02 NOTE — PROGRESS NOTES
[facility] Medicine Services  PROGRESS NOTE    Patient Name: Scott Benz  : 1955  MRN: [id number]    Date of Admission: 3/29/2025  Primary Care Physician: Josh Green PA-C    Subjective   Subjective     CC:  F/U dyspnea, fatigue    HPI:  No new issues overnight  Awaiting SNF once insurance gets lined out      Objective   Objective     Vital Signs:   Temp:  [96.8 °F (36 °C)-98.8 °F (37.1 °C)] 98.8 °F (37.1 °C)  Heart Rate:  [80-90] 80  Resp:  [16-18] 18  BP: (110-123)/(75-94) 119/76  Flow (L/min) (Oxygen Therapy):  [1] 1     Physical Exam:  Constitutional: No acute distress, awake, alert  HENT: NCAT, mucous membranes moist  Respiratory: Clear to auscultation bilaterally, respiratory effort normal   Cardiovascular: RRR, no murmurs, rubs, or gallops  Gastrointestinal: Positive bowel sounds, soft, nontender, nondistended  Musculoskeletal: No bilateral ankle edema  Psychiatric: Appropriate affect, cooperative  Neurologic: Oriented x 3,LIN, speech clear  Skin: No rashes noted      Results Reviewed:  LAB RESULTS:      Lab 25  0418 25  1204 25  0826 25  0101 25  1005   WBC 7.28 6.52 5.61 7.50 7.54   HEMOGLOBIN 14.4 15.3 14.8 14.6 14.5   HEMATOCRIT 44.6 47.4 44.9 45.3 45.8   PLATELETS 242 247 228 258 262   NEUTROS ABS  --   --  3.17 4.31 5.28   IMMATURE GRANS (ABS)  --   --  0.01 0.02 0.02   LYMPHS ABS  --   --  1.71 2.30 1.59   MONOS ABS  --   --  0.55 0.68 0.52   EOS ABS  --   --  0.12 0.13 0.07   MCV 93.1 92.6 93.3 92.3 94.0   PROCALCITONIN  --   --   --  0.05  --    LACTATE  --   --   --  1.2  --    D DIMER QUANT  --   --   --  0.52  --    HSTROP T  --   --  41* 39*  --          Lab 25  0443 25  1151 25  0418 25  2344 25  1204 25  0826 25  0101 25  1437   SODIUM 139  --  141  --  143 143 141  --    POTASSIUM 4.2 4.6 3.8 4.0 3.8 4.2 3.8  --    CHLORIDE 102  --  101  --  98 103 101  --    CO2 22.0  --  26.0   --  29.0 25.0 26.0  --    ANION GAP 15.0  --  14.0  --  16.0* 15.0 14.0  --    BUN 23  --  28*  --  30* 26* 27*  --    CREATININE 1.10  --  1.42*  --  1.41* 0.95 1.09  --    EGFR 72.7  --  53.5*  --  53.9* 86.6 73.5  --    GLUCOSE 183*  --  174*  --  181* 158* 193*  --    CALCIUM 8.6  --  8.5*  --  9.4 8.8 9.0  --    MAGNESIUM  --   --   --   --   --   --  2.0 2   PHOSPHORUS 3.0  --   --   --   --   --   --   --    HEMOGLOBIN A1C  --   --   --   --   --   --  6.90*  --    TSH  --   --   --   --   --   --  2.530  --          Lab 04/02/25  0443 03/31/25  1204 03/30/25  0101 03/28/25  1005   TOTAL PROTEIN  --  7.1 7.0 6.9   ALBUMIN 3.6 4.1 4.0 4.0   GLOBULIN  --  3.0 3.0 2.9   ALT (SGPT)  --  16 23 35   AST (SGOT)  --  16 19 32   BILIRUBIN  --  0.8 0.8 0.5   ALK PHOS  --  90 80 86   LIPASE  --   --  17 20         Lab 04/02/25  0443 03/30/25  0826 03/30/25  0101 03/28/25  1005   PROBNP 1,093.0*  --  2,477.0* 1,915.0*   HSTROP T  --  41* 39*  --          Lab 03/30/25  0826   CHOLESTEROL 167   LDL CHOL 110*   HDL CHOL 32*   TRIGLYCERIDES 137             Brief Urine Lab Results  (Last result in the past 365 days)        Color   Clarity   Blood   Leuk Est   Nitrite   Protein   CREAT   Urine HCG        03/29/25 2258 Yellow   Clear   Trace   Moderate (2+)   Negative   Negative                   Microbiology Results Abnormal       Procedure Component Value - Date/Time    Blood Culture - Blood, Arm, Left [[id number]]  (Abnormal) Collected: 03/30/25 0101    Lab Status: Final result Specimen: Blood from Arm, Left Updated: 04/01/25 0611     Blood Culture Staphylococcus, coagulase negative     Isolated from Aerobic Bottle     Gram Stain Aerobic Bottle Gram positive cocci in groups    Narrative:      Probable contaminant requires clinical correlation, susceptibility not performed unless requested by physician.      Less than seven (7) mL's of blood was collected.  Insufficient quantity may yield false negative results.    Blood  Culture ID, PCR - Blood, Arm, Left [[id number]]  (Abnormal) Collected: 03/30/25 0101    Lab Status: Final result Specimen: Blood from Arm, Left Updated: 03/31/25 1208     BCID, PCR Staph spp, not aureus or lugdunensis. Identification by BCID2 PCR.     BOTTLE TYPE Aerobic Bottle            No radiology results from the last 24 hrs      Results for orders placed during the hospital encounter of 03/29/25    Adult Transthoracic Echo Complete With Contrast if Necessary Per Protocol    Interpretation Summary    Left ventricular ejection fraction appears to be 36 - 40%.    Left ventricular wall thickness is consistent with mild to moderate concentric hypertrophy.    There is global left ventricular hypokinesis    Left ventricular diastolic function is consistent with (grade III w/high LAP) reversible restrictive pattern.    Mild aortic valve stenosis is present.    Moderate to severe mitral valve regurgitation is present with an eccentric jet noted.    There is a right pleural effusion.      Current medications:  Scheduled Meds:ampicillin-sulbactam, 3 g, Intravenous, Q6H  aspirin, 162 mg, Oral, Daily  bumetanide, 0.5 mg, Oral, Daily  clopidogrel, 75 mg, Oral, Daily  Diclofenac Sodium, 2 g, Topical, BID  doxycycline, 100 mg, Oral, Q12H  empagliflozin, 25 mg, Oral, Daily  gabapentin, 600 mg, Oral, Q8H  guaiFENesin, 600 mg, Oral, Q12H  heparin (porcine), 5,000 Units, Subcutaneous, Q8H  insulin glargine, 10 Units, Subcutaneous, Daily  Insulin Lispro, 2 Units, Subcutaneous, TID With Meals  insulin lispro, 2-7 Units, Subcutaneous, 4x Daily AC & at Bedtime  metoprolol succinate XL, 50 mg, Oral, Q24H  pantoprazole, 40 mg, Oral, Q AM  pharmacy consult - MTM, , Not Applicable, Daily  polyethylene glycol, 17 g, Oral, Daily  Psyllium, 1 packet, Oral, Daily  rosuvastatin, 20 mg, Oral, Daily  sacubitril-valsartan, 1 tablet, Oral, Q12H  senna-docusate sodium, 2 tablet, Oral, BID  sodium chloride, 10 mL, Intravenous, Q12H      Continuous  Infusions:     PRN Meds:.  acetaminophen **OR** acetaminophen **OR** acetaminophen    albuterol    senna-docusate sodium **AND** polyethylene glycol **AND** bisacodyl **AND** bisacodyl    Calcium Replacement - Follow Nurse / BPA Driven Protocol    dextrose    dextrose    glucagon (human recombinant)    ipratropium-albuterol    Magnesium Standard Dose Replacement - Follow Nurse / BPA Driven Protocol    nitroglycerin    Phosphorus Replacement - Follow Nurse / BPA Driven Protocol    Polyvinyl Alcohol-Povidone PF    Potassium Replacement - Follow Nurse / BPA Driven Protocol    promethazine **OR** promethazine    sodium chloride    sodium chloride    Assessment & Plan   Assessment & Plan     Active Hospital Problems    Diagnosis  POA    **CHF exacerbation [I50.9]  Yes    Arthritis [M19.90]  Yes    Stage 2 chronic kidney disease [N18.2]  Yes    Orthopnea [R06.01]  Unknown    RLQ abdominal pain [R10.31]  Unknown    Chronic idiopathic constipation [K59.04]  Unknown    Dysuria [R30.0]  Unknown    Pneumonia of both lower lobes due to infectious organism [J18.9]  Unknown    Unilateral AKA, right [S78.111A]  Yes    Type 2 diabetes mellitus with hyperglycemia, with long-term current use of insulin [E11.65, Z79.4]  Not Applicable    Dyspnea on exertion [R06.09]  Yes    Essential hypertension [I10]  Yes    Hyperlipidemia [E78.5]  Yes    Diabetic polyneuropathy associated with type 2 diabetes mellitus [E11.42]  Yes      Resolved Hospital Problems    Diagnosis Date Resolved POA    Diabetic nephropathy [E11.21] 03/29/2025 Yes        Brief Hospital Course to date:  Scott Benz is a 69 y.o. male with hx of CHF, HTN, HLD, CKD, PVD s/p R AKA, PAD s/p fem/tib bypass graft, peripheral neuropathy, T2DM - insulin dependent, CVA, and arthritis who presented due to progressive dyspnea and fatigue. Concern for CHF exacerbation. Admitted for further management.     This patient's problems and plans were partially entered by my partner and updated  as appropriate by me 04/02/25. Copied text in this note has been reviewed and is accurate as of today's date.     Acute HFrEF  Ischemic Heart Disease  Type 2 MI 2nd to CHF  Possible LBB Myopathy  - Previously had normal EF in 2022 with mild aortic stenosis  - Echo 3/30/25: EF 36-40%, grade 3 diastolic dysfunction, moderate to severe MR  - s/p IV Lasix at OSH, lasix changed to bumex  - Cardiology consulted, s/p LHC on 3/31 with stents to the ramus and the obtuse marginal, also has disease of the RCA as well as some small vessels.  Recs DAPT x 1 year.  Recs to repeat echo as an outpatient.  Recs to f/u with Either Dr. Michelle or Dr. [facility] 6 weeks after discharge at [place]  - Continue Jardiance, entresto  - Daily weights, strict I/O    Possible superimposed bacterial pneumonia  - CXR showing bibasilar infiltrates with small bilateral pleural effusions  - Procal, WBC normal  - Negative urine Strep and Legionella  - Blood cultures - 1 bottle with Staph, suspect contaminant  - Continue Unasyn / Doxycycline  - Continue nebs, IS/flutter valve      Elevated troponin  Prolonged QTc  Tachycardia / PVC's on EKG  - Troponins flat without acute EKG changes  - Denies chest pain     RLQ pain  Chronic constipation  Dysuria  - Continue PPI  - Continue metamucil / miralax  - Bowel regimen scheduled  - KUB with mild to moderate stool burden  - UA negative for infection      Elevated Cr  CKD 2  - Baseline Cr ~1.0-1.3  - Worse  following diuretics on 3/31, stable today.  Continue to hold diuretics  - Avoid nephrotoxic meds     HTN   HLD  Hx CVA  - Continue metoprolol  - Continue aspirin / statin / plavix     PAD / Hx of R AKA (related to osteomyelitis)  - PT / OT consult for mobility  - Previously using prosthesis but currently not able to and using wheelchair     T2DM - insulin dependent  Diabetic polyneuropathy  - HbA1c 6.9%  - Hold metformin, glimepiride  - Continue gabapentin for neuropathy  - Basal/bolus regimen + SSI, adjust as  needed   Latest Reference Range & Units 04/02/25 04:43 04/02/25 07:33 04/02/25 11:24 04/02/25 17:16   Glucose 70 - 130 mg/dL 183 (H) 181 (H) 192 (H) 238 (H)       Expected Discharge Location and Transportation: TBD  Expected Discharge   Expected Discharge Date: 4/3/2025; Expected Discharge Time:      VTE Prophylaxis:  Pharmacologic VTE prophylaxis orders are present.         AM-PAC 6 Clicks Score (PT): 13 (04/01/25 0750)    CODE STATUS:   Code Status and Medical Interventions: CPR (Attempt to Resuscitate); Full Support   Ordered at: 03/29/25 2449     Code Status (Patient has no pulse and is not breathing):    CPR (Attempt to Resuscitate)     Medical Interventions (Patient has pulse or is breathing):    Full Support       TOMÁS Winston  04/02/25

## 2025-04-02 NOTE — PROGRESS NOTES
"[facility]  IP Progress Note      Chief Complaint: Follow-up of acute HFrEF/cardiomyopathy/non-STEMI    Subjective   Continues to diurese well, states that his breathing has remarkably improved.  No current chest pain.  Not sure whether he is going home or to rehab.  He lives in [place], states that he was followed by some home health agency called [facility] and that may have triggered the confusion that he wants to follow-up in [facility] which is far from his home.      Objective     Blood pressure 110/75, pulse 83, temperature 97.8 °F (36.6 °C), temperature source Oral, resp. rate 16, height 172 cm (67.72\"), weight 92.3 kg (203 lb 8 oz), SpO2 95%.     Intake/Output Summary (Last 24 hours) at 4/2/2025 0751  Last data filed at 4/2/2025 0454  Gross per 24 hour   Intake 1120 ml   Output 3800 ml   Net -2680 ml       Physical Exam:  General: No apparent distress.  Neck: no JVD.  Chest:No respiratory distress, breath sounds are normal. No wheezes,  rhonchi or rales.  Cardiovascular: Normal S1 and S2, 2/6 systolic ejection murmur.  Extremities: No significant left ankle edema.  Right BKA      Results Review:     I reviewed the patient's new clinical results.    Results from last 7 days   Lab Units 04/01/25  0418   WBC 10*3/mm3 7.28   HEMOGLOBIN g/dL 14.4   HEMATOCRIT % 44.6   PLATELETS 10*3/mm3 242     Results from last 7 days   Lab Units 04/02/25  0443 03/31/25  2344 03/31/25  1204   SODIUM mmol/L 139   < > 143   POTASSIUM mmol/L 4.2   < > 3.8   CHLORIDE mmol/L 102   < > 98   CO2 mmol/L 22.0   < > 29.0   BUN mg/dL 23   < > 30*   CREATININE mg/dL 1.10   < > 1.41*   CALCIUM mg/dL 8.6   < > 9.4   BILIRUBIN mg/dL  --   --  0.8   ALK PHOS U/L  --   --  90   ALT (SGPT) U/L  --   --  16   AST (SGOT) U/L  --   --  16   GLUCOSE mg/dL 183*   < > 181*    < > = values in this interval not displayed.     Results from last 7 days   Lab Units 04/02/25  0443   SODIUM mmol/L 139 "   POTASSIUM mmol/L 4.2   CHLORIDE mmol/L 102   CO2 mmol/L 22.0   BUN mg/dL 23   CREATININE mg/dL 1.10   GLUCOSE mg/dL 183*   CALCIUM mg/dL 8.6         Lab Results   Component Value Date    CKTOTAL 37 11/22/2021    TROPONINT 41 (H) 03/30/2025     Results from last 7 days   Lab Units 03/30/25  0101   TSH uIU/mL 2.530     Results from last 7 days   Lab Units 03/30/25  0826   CHOLESTEROL mg/dL 167   TRIGLYCERIDES mg/dL 137   HDL CHOL mg/dL 32*   LDL CHOL mg/dL 110*     ampicillin-sulbactam, 3 g, Intravenous, Q6H  aspirin, 162 mg, Oral, Daily  bumetanide, 0.5 mg, Oral, Daily  clopidogrel, 75 mg, Oral, Daily  Diclofenac Sodium, 2 g, Topical, BID  doxycycline, 100 mg, Intravenous, Q12H  empagliflozin, 25 mg, Oral, Daily  gabapentin, 600 mg, Oral, Q8H  guaiFENesin, 600 mg, Oral, Q12H  heparin (porcine), 5,000 Units, Subcutaneous, Q8H  insulin glargine, 10 Units, Subcutaneous, Daily  Insulin Lispro, 2 Units, Subcutaneous, TID With Meals  insulin lispro, 2-7 Units, Subcutaneous, 4x Daily AC & at Bedtime  metoprolol tartrate, 25 mg, Oral, BID  pantoprazole, 40 mg, Oral, Q AM  pharmacy consult - MTM, , Not Applicable, Daily  polyethylene glycol, 17 g, Oral, Daily  Psyllium, 1 packet, Oral, Daily  rosuvastatin, 20 mg, Oral, Daily  sacubitril-valsartan, 1 tablet, Oral, Q12H  senna-docusate sodium, 2 tablet, Oral, BID  sodium chloride, 10 mL, Intravenous, Q12H       Tele: [facility]      Assessment:  Acute HFrEF, diuresed well, improved  Cardiomyopathy, EF 36-40%, unclear etiology  Non-STEMI, most probably type II  CAD, status post THOR to the ramus intermedius and proximal circumflex with balloon PTCA of the obtuse marginal.  Has significant residual disease of distal LAD and RCA as well as right.  Valvular heart disease, moderate to severe mitral regurgitation, mild aortic stenosis.  Hypertension.  Dyslipidemia.  Type 2 diabetes.  CKD.  Plan:  Continues to diurese well, continue current dose of Bumex.  Continue aspirin/Plavix  for CAD, status post THOR.  He will be scheduled for staged PCI of the RCA and LAD in 2 to 3 weeks.  Subsequently we will arrange cardiology follow-up closer to his home which is in [place].  Continue rest of the current medical management including Entresto, Jardiance and Crestor.  Change metoprolol to Toprol-XL  Okay to transfer to rehab.  My office will contact him to schedule staged PCI procedures and further follow-up appointments will be scheduled after that.    Becky Villela MD, FACC, [facility]

## 2025-04-03 VITALS
TEMPERATURE: 98.3 F | WEIGHT: 201.2 LBS | HEART RATE: 87 BPM | RESPIRATION RATE: 18 BRPM | SYSTOLIC BLOOD PRESSURE: 116 MMHG | OXYGEN SATURATION: 95 % | HEIGHT: 68 IN | BODY MASS INDEX: 30.49 KG/M2 | DIASTOLIC BLOOD PRESSURE: 84 MMHG

## 2025-04-03 LAB
ANION GAP SERPL CALCULATED.3IONS-SCNC: 11 MMOL/L (ref 5–15)
BUN SERPL-MCNC: 28 MG/DL (ref 8–23)
BUN/CREAT SERPL: 22.8 (ref 7–25)
CALCIUM SPEC-SCNC: 8.8 MG/DL (ref 8.6–10.5)
CHLORIDE SERPL-SCNC: 105 MMOL/L (ref 98–107)
CO2 SERPL-SCNC: 21 MMOL/L (ref 22–29)
CREAT SERPL-MCNC: 1.23 MG/DL (ref 0.76–1.27)
EGFRCR SERPLBLD CKD-EPI 2021: 63.6 ML/MIN/1.73
GLUCOSE BLDC GLUCOMTR-MCNC: 177 MG/DL (ref 70–130)
GLUCOSE BLDC GLUCOMTR-MCNC: 338 MG/DL (ref 70–130)
GLUCOSE SERPL-MCNC: 320 MG/DL (ref 65–99)
MAGNESIUM SERPL-MCNC: 2.3 MG/DL (ref 1.6–2.4)
POTASSIUM SERPL-SCNC: 4.6 MMOL/L (ref 3.5–5.2)
SODIUM SERPL-SCNC: 137 MMOL/L (ref 136–145)

## 2025-04-03 PROCEDURE — 25010000002 AMPICILLIN-SULBACTAM PER 1.5 G: Performed by: INTERNAL MEDICINE

## 2025-04-03 PROCEDURE — 97535 SELF CARE MNGMENT TRAINING: CPT

## 2025-04-03 PROCEDURE — 80048 BASIC METABOLIC PNL TOTAL CA: CPT | Performed by: INTERNAL MEDICINE

## 2025-04-03 PROCEDURE — 97530 THERAPEUTIC ACTIVITIES: CPT

## 2025-04-03 PROCEDURE — 83735 ASSAY OF MAGNESIUM: CPT | Performed by: INTERNAL MEDICINE

## 2025-04-03 PROCEDURE — 82948 REAGENT STRIP/BLOOD GLUCOSE: CPT

## 2025-04-03 PROCEDURE — 63710000001 INSULIN GLARGINE PER 5 UNITS: Performed by: NURSE PRACTITIONER

## 2025-04-03 PROCEDURE — 99239 HOSP IP/OBS DSCHRG MGMT >30: CPT | Performed by: NURSE PRACTITIONER

## 2025-04-03 PROCEDURE — 63710000001 INSULIN LISPRO (HUMAN) PER 5 UNITS: Performed by: INTERNAL MEDICINE

## 2025-04-03 PROCEDURE — 25010000002 HEPARIN (PORCINE) PER 1000 UNITS: Performed by: INTERNAL MEDICINE

## 2025-04-03 RX ORDER — POLYETHYLENE GLYCOL 3350 17 G/17G
17 POWDER, FOR SOLUTION ORAL DAILY
Qty: 30 EACH | Refills: 0 | Status: SHIPPED | OUTPATIENT
Start: 2025-04-04

## 2025-04-03 RX ORDER — PANTOPRAZOLE SODIUM 40 MG/1
40 TABLET, DELAYED RELEASE ORAL
Qty: 30 TABLET | Refills: 0 | Status: SHIPPED | OUTPATIENT
Start: 2025-04-04

## 2025-04-03 RX ORDER — METOPROLOL SUCCINATE 50 MG/1
50 TABLET, EXTENDED RELEASE ORAL
Qty: 30 TABLET | Refills: 0 | Status: SHIPPED | OUTPATIENT
Start: 2025-04-04

## 2025-04-03 RX ORDER — DOXYCYCLINE 100 MG/1
100 CAPSULE ORAL EVERY 12 HOURS SCHEDULED
Qty: 1 CAPSULE | Refills: 0 | Status: SHIPPED | OUTPATIENT
Start: 2025-04-03 | End: 2025-04-04

## 2025-04-03 RX ORDER — BUMETANIDE 0.5 MG/1
0.5 TABLET ORAL DAILY
Qty: 30 TABLET | Refills: 0 | Status: SHIPPED | OUTPATIENT
Start: 2025-04-04

## 2025-04-03 RX ORDER — ASPIRIN 325 MG
162 TABLET ORAL DAILY
Qty: 30 TABLET | Refills: 0 | Status: SHIPPED | OUTPATIENT
Start: 2025-04-04

## 2025-04-03 RX ORDER — CLOPIDOGREL BISULFATE 75 MG/1
75 TABLET ORAL DAILY
Qty: 30 TABLET | Refills: 0 | Status: SHIPPED | OUTPATIENT
Start: 2025-04-04

## 2025-04-03 RX ADMIN — BUMETANIDE 0.5 MG: 1 TABLET ORAL at 08:55

## 2025-04-03 RX ADMIN — DOXYCYCLINE 100 MG: 100 CAPSULE ORAL at 08:55

## 2025-04-03 RX ADMIN — AMPICILLIN SODIUM AND SULBACTAM SODIUM 3 G: 2; 1 INJECTION, POWDER, FOR SOLUTION INTRAMUSCULAR; INTRAVENOUS at 00:24

## 2025-04-03 RX ADMIN — AMPICILLIN SODIUM AND SULBACTAM SODIUM 3 G: 2; 1 INJECTION, POWDER, FOR SOLUTION INTRAMUSCULAR; INTRAVENOUS at 05:57

## 2025-04-03 RX ADMIN — PANTOPRAZOLE SODIUM 40 MG: 40 TABLET, DELAYED RELEASE ORAL at 05:58

## 2025-04-03 RX ADMIN — INSULIN LISPRO 5 UNITS: 100 INJECTION, SOLUTION INTRAVENOUS; SUBCUTANEOUS at 12:42

## 2025-04-03 RX ADMIN — INSULIN LISPRO 2 UNITS: 100 INJECTION, SOLUTION INTRAVENOUS; SUBCUTANEOUS at 08:54

## 2025-04-03 RX ADMIN — GUAIFENESIN 600 MG: 600 TABLET ORAL at 08:55

## 2025-04-03 RX ADMIN — Medication 10 ML: at 08:56

## 2025-04-03 RX ADMIN — ROSUVASTATIN CALCIUM 20 MG: 20 TABLET, FILM COATED ORAL at 08:55

## 2025-04-03 RX ADMIN — HEPARIN SODIUM 5000 UNITS: 5000 INJECTION INTRAVENOUS; SUBCUTANEOUS at 05:57

## 2025-04-03 RX ADMIN — DICLOFENAC SODIUM 2 G: 10 GEL TOPICAL at 08:56

## 2025-04-03 RX ADMIN — ASPIRIN 162 MG: 325 TABLET ORAL at 08:55

## 2025-04-03 RX ADMIN — GABAPENTIN 600 MG: 300 CAPSULE ORAL at 14:31

## 2025-04-03 RX ADMIN — METOPROLOL SUCCINATE 50 MG: 50 TABLET, EXTENDED RELEASE ORAL at 08:55

## 2025-04-03 RX ADMIN — AMPICILLIN SODIUM AND SULBACTAM SODIUM 3 G: 2; 1 INJECTION, POWDER, FOR SOLUTION INTRAMUSCULAR; INTRAVENOUS at 12:42

## 2025-04-03 RX ADMIN — SACUBITRIL AND VALSARTAN 1 TABLET: 24; 26 TABLET, FILM COATED ORAL at 08:55

## 2025-04-03 RX ADMIN — INSULIN GLARGINE 13 UNITS: 100 INJECTION, SOLUTION SUBCUTANEOUS at 08:55

## 2025-04-03 RX ADMIN — EMPAGLIFLOZIN 25 MG: 25 TABLET, FILM COATED ORAL at 08:55

## 2025-04-03 RX ADMIN — GABAPENTIN 600 MG: 300 CAPSULE ORAL at 05:57

## 2025-04-03 RX ADMIN — INSULIN LISPRO 2 UNITS: 100 INJECTION, SOLUTION INTRAVENOUS; SUBCUTANEOUS at 12:43

## 2025-04-03 RX ADMIN — HEPARIN SODIUM 5000 UNITS: 5000 INJECTION INTRAVENOUS; SUBCUTANEOUS at 14:31

## 2025-04-03 RX ADMIN — CLOPIDOGREL BISULFATE 75 MG: 75 TABLET, FILM COATED ORAL at 08:55

## 2025-04-03 NOTE — CASE MANAGEMENT/SOCIAL WORK
Case Management Discharge Note      Final Note: Pt is discharging to SNF @ [facility] today. CM confirmed with [facility] facility liaison, Pt can be accepted today. Since [place] insurance has terminated, Pt is aware rehab costs will be processed under Medicare A only, and he is agreeable to this. CM will fax a copy of the discharge summary to [phone]. RN to call report to [phone]. Any controlled meds will be escribed to [facility] (pharmacy verified in [facility]). [facility] will  Pt in room @ 1400; Provider and RN informed. Reservation #[id number]. Pt and spouse, Hailey, are aware and agreeable to plan. No other discharge needs identified.         Selected Continued Care - Admitted Since 3/29/2025       Destination Coordination complete.      Service Provider Services Address Phone Fax Patient Preferred    [facility] [address] [id number] [phone] --              Durable Medical Equipment    No services have been selected for the patient.                Dialysis/Infusion    No services have been selected for the patient.                Home Medical Care    No services have been selected for the patient.                Therapy    No services have been selected for the patient.                Community Resources    No services have been selected for the patient.                Community & DME    No services have been selected for the patient.                    Transportation Services  W/C Van: [facility] (4/3 @ 1400)    Final Discharge Disposition Code: 03 - skilled nursing facility (SNF)

## 2025-04-03 NOTE — THERAPY TREATMENT NOTE
Patient Name: Scott Benz  : 1955    MRN: [id number]                              Today's Date: 4/3/2025       Admit Date: 3/29/2025    Visit Dx: No diagnosis found.  Patient Active Problem List   Diagnosis    Diabetic polyneuropathy associated with type 2 diabetes mellitus    Arthritis    Stage 2 chronic kidney disease    CHF exacerbation    History of CVA (cerebrovascular accident)    Type 2 diabetes mellitus with hyperglycemia, with long-term current use of insulin    Hypoglycemia    Diabetic retinopathy    Essential hypertension    Hyperlipidemia    Osteoarthritis of knee    PAD (peripheral artery disease)    Dyspnea on exertion    Unilateral AKA, right    Orthopnea    RLQ abdominal pain    Chronic idiopathic constipation    Dysuria    Pneumonia of both lower lobes due to infectious organism    Chest pain, atypical     Past Medical History:   Diagnosis Date    Arthritis     with hands    CHF (congestive heart failure)     CKD (chronic kidney disease)     CVA (cerebral vascular accident)     Diabetes     H/O lower limb amputation     Hypertension     Stroke 11/10/2019     Past Surgical History:   Procedure Laterality Date    ABOVE KNEE LEG AMPUTATION      AMPUTATION DIGIT Left 2019    Procedure: excision left 4th metatarsal;  Surgeon: Shannan Kraft MD;  Location: [facility];  Service: Orthopedics    APPENDECTOMY      CARDIAC CATHETERIZATION N/A 3/31/2025    Procedure: Left Heart Cath - Right radial access;  Surgeon: Becky Villela MD;  Location: [facility] CATH INVASIVE LOCATION;  Service: Cardiovascular;  Laterality: N/A;    CARDIAC CATHETERIZATION N/A 3/31/2025    Procedure: Stent THOR coronary;  Surgeon: Becky Villela MD;  Location: [facility] CATH INVASIVE LOCATION;  Service: Cardiovascular;  Laterality: N/A;    INCISION AND DRAINAGE LEG Left 2019    Procedure: I&D left foot wound;  Surgeon: Shannan Kraft MD;  Location: [facility];  Service: Orthopedics      General Information       Row Name  04/03/25 0844          OT Time and Intention    Subjective Information no complaints  -MAYRA     Document Type therapy note (daily note)  -MAYRA     Mode of Treatment individual therapy;occupational therapy  -MAYRA     Patient Effort excellent  -MAYRA     Symptoms Noted During/After Treatment none  -MAYRA       Row Name 04/03/25 0844          General Information    Patient Profile Reviewed yes  -MAYRA     Existing Precautions/Restrictions other (see comments)  h/o R AKA  -MAYRA     Barriers to Rehab previous functional deficit;physical barrier  -MAYRA       Row Name 04/03/25 0844          Cognition    Orientation Status (Cognition) oriented x 3  -MAYRA       Row Name 04/03/25 0844          Safety Issues/Impairments Affecting Functional Mobility    Safety Issues Affecting Function (Mobility) safety precaution awareness  -MAYRA     Impairments Affecting Function (Mobility) balance;postural/trunk control;strength;cognition  -MAYRA     Cognitive Impairments, Mobility Safety/Performance impulsivity;safety precaution awareness  -MAYRA               User Key  (r) = Recorded By, (t) = Taken By, (c) = Cosigned By      Initials Name Provider Type    Delmis Garcia, OT Occupational Therapist                     Mobility/ADL's       Row Name 04/03/25 0845          Bed Mobility    Supine-Sit [place] (Bed Mobility) standby assist  -MAYRA     Assistive Device (Bed Mobility) head of bed elevated;bed rails  -       Row Name 04/03/25 0845          Transfers    Transfers sit-stand transfer;stand-sit transfer;bed-chair transfer  -MAYRA     Comment, (Transfers) squat pivot bed to chair  -MAYRA       Row Name 04/03/25 0845          Bed-Chair Transfer    Bed-Chair [place] (Transfers) contact guard  -MAYRA     Comment, (Bed-Chair Transfer) pt. used bed rail and arm of chair to perform pivot to recliner  -MAYRA       Row Name 04/03/25 0845          Sit-Stand Transfer    Sit-Stand [place] (Transfers) contact guard;verbal cues  -     Assistive Device (Sit-Stand  Transfers) walker, front-wheeled  -MAYRA     Comment, (Sit-Stand Transfer) pt. completed 4 stands, pt. tends to put both hands on walker to stand instead of pushing up, but does not pull on walker, but pushes down into walker, pt. stood each time grossly 30 seconds, pt. stood once impulsively SBA  -MAYRA       Row Name 04/03/25 0845          Stand-Sit Transfer    Stand-Sit [place] (Transfers) contact guard  -MAYRA     Assistive Device (Stand-Sit Transfers) walker, front-wheeled  -MAYRA       Row Name 04/03/25 0845          Functional Mobility    Functional Mobility- Ind. Level unable to perform  -MAYRA       Row Name 04/03/25 0845          Activities of Daily Living    BADL Assessment/Intervention lower body dressing;grooming;toileting  -MAYRA       Row Name 04/03/25 0845          Lower Body Dressing Assessment/Training    [place] Level (Lower Body Dressing) doff;don;socks;independent  -MAYRA     Position (Lower Body Dressing) supported sitting  -MAYRA       Row Name 04/03/25 0845          Grooming Assessment/Training    [place] Level (Grooming) hair care, combing/brushing;set up;wash face, hands  -MAYRA     Position (Grooming) unsupported sitting;supported sitting  -MAYRA       Row Name 04/03/25 0845          Toileting Assessment/Training    [place] Level (Toileting) perform perineal hygiene;change pad/brief;dependent (less than 25% patient effort)  -MAYRA     Position (Toileting) supported standing  -MAYRA     Comment, (Toileting) noted stool on pads in bed when stood, pt. sat and stood two more times to be cleaned  -MAYRA               User Key  (r) = Recorded By, (t) = Taken By, (c) = Cosigned By      Initials Name Provider Type    Delmis Garcia OT Occupational Therapist                   Obj/Interventions       Row Name 04/03/25 0825          Shoulder (Therapeutic Exercise)    Shoulder (Therapeutic Exercise) AROM (active range of motion)  -MAYRA     Shoulder AROM (Therapeutic Exercise) bilateral;flexion;extension;horizontal  aBduction/aDduction;supine;15 repititions  -       Row Name 04/03/25 0848          Elbow/Forearm (Therapeutic Exercise)    Elbow/Forearm (Therapeutic Exercise) strengthening exercise  -     Elbow/Forearm Strengthening (Therapeutic Exercise) bilateral;flexion;extension;supine;15 repititions  moderate manual resistance given  -       Row Name 04/03/25 0848          Motor Skills    Therapeutic Exercise shoulder;elbow/forearm  -       Row Name 04/03/25 0848          Balance    Static Sitting Balance supervision  -     Dynamic Sitting Balance standby assist  -MAYRA     Position, Sitting Balance unsupported;sitting in chair;sitting edge of bed  -     Static Standing Balance contact guard;verbal cues;1-person assist  -MAYRA     Dynamic Standing Balance contact guard;1-person assist  -MAYRA     Position/Device Used, Standing Balance supported;walker, front-wheeled  -MAYRA     Balance Interventions sit to stand;occupation based/functional task  -     Comment, Balance 4 stands completed CGA to SBA for grossly 30 seconds, LBD, toileting  -MAYRA               User Key  (r) = Recorded By, (t) = Taken By, (c) = Cosigned By      Initials Name Provider Type    Delmis Garcia, OT Occupational Therapist                   Goals/Plan       Row Name 04/03/25 0906          Transfer Goal 1 (OT)    Progress/Outcome (Transfer Goal 1, OT) goal partially met  met sit to stand and bed to chair  -       Row Name 04/03/25 0906          Dressing Goal 1 (OT)    Progress/Outcome (Dressing Goal 1, OT) goal partially met;goal ongoing  met sock  -       Row Name 04/03/25 0906          Toileting Goal 1 (OT)    Progress/Outcome (Toileting Goal 1, OT) goal ongoing  -       Row Name 04/03/25 0906          Strength Goal 1 (OT)    Strength Goal 1 (OT) Patient will complete 15 reps each UE blue tband TE to support ADL and related mobility independence.  -MAYRA     Time Frame (Strength Goal 1, OT) short term goal (STG);5 days  -MAYRA     Progress/Outcome  (Strength Goal 1, OT) new goal  -MAYRA               User Key  (r) = Recorded By, (t) = Taken By, (c) = Cosigned By      Initials Name Provider Type    Delmis Garcia, OT Occupational Therapist                   Clinical Impression       Row Name 04/03/25 0850          Pain Assessment    Pretreatment Pain Rating 0/10 - no pain  -MAYRA     Posttreatment Pain Rating 0/10 - no pain  -MAYRA       Row Name 04/03/25 0850          Plan of Care Review    Plan of Care Reviewed With patient  -MAYRA     Progress improving  -MAYRA     Outcome Evaluation Pt. was oriented witihout pain.  Pt. demonstrated a little impulsivity due to feeling more independent today. Pt. competed 4 sit to stands in walker and pivot transfer to recliner SBA to CGA.  Pt. able to vincenzo and doff sock independently today.  Will advance to pants next session.  Completed UE TE.  Will benefit from blue tband.  Good progress to goals.  -MAYRA       Row Name 04/03/25 0850          Therapy Assessment/Plan (OT)    Rehab Potential (OT) good  -MAYRA     Criteria for Skilled Therapeutic Interventions Met (OT) yes;meets criteria;skilled treatment is necessary  -MAYRA     Therapy Frequency (OT) daily  -MAYRA       Row Name 04/03/25 0850          Therapy Plan Review/Discharge Plan (OT)    Anticipated Discharge Disposition (OT) inpatient rehabilitation facility  -MAYRA       Row Name 04/03/25 0850          Vital Signs    Pre Systolic BP Rehab 120  -MAYRA     Pre Treatment Diastolic BP 80  -MAYRA     Pretreatment Heart Rate (beats/min) 81  -MAYRA     Posttreatment Heart Rate (beats/min) 83  -MAYRA     Pre SpO2 (%) 96  -MAYRA     O2 Delivery Pre Treatment room air  -MAYRA     O2 Delivery Intra Treatment room air  -MAYRA     Post SpO2 (%) 96  -MAYRA     O2 Delivery Post Treatment room air  -MAYRA     Pre Patient Position Supine  -MAYRA     Intra Patient Position Standing  -MAYRA     Post Patient Position Sitting  -MAYRA       Row Name 04/03/25 0850          Positioning and Restraints    Pre-Treatment Position in bed  -MAYRA     Post  Treatment Position chair  -MAYRA     In Chair notified nsg;reclined;call light within reach;encouraged to call for assist;exit alarm on;LLE elevated;L heel elevated  -MAYRA               User Key  (r) = Recorded By, (t) = Taken By, (c) = Cosigned By      Initials Name Provider Type    Delmis Garcia OT Occupational Therapist                   Outcome Measures       Row Name 04/03/25 0918          How much help from another is currently needed...    Putting on and taking off regular lower body clothing? 3  -MAYRA     Bathing (including washing, rinsing, and drying) 2  -MAYRA     Toileting (which includes using toilet bed pan or urinal) 2  -MAYRA     Putting on and taking off regular upper body clothing 4  -MAYRA     Taking care of personal grooming (such as brushing teeth) 4  sitting  -MAYRA     Eating meals 4  -MAYRA     AM-PAC 6 Clicks Score (OT) 19  -MAYRA       Row Name 04/03/25 0918          Functional Assessment    Outcome Measure Options AM-PAC 6 Clicks Daily Activity (OT)  -MAYRA               User Key  (r) = Recorded By, (t) = Taken By, (c) = Cosigned By      Initials Name Provider Type    Delmis Garcia OT Occupational Therapist                    Occupational Therapy Education       Title: PT OT SLP Therapies (In Progress)       Topic: Occupational Therapy (In Progress)       Point: ADL training (Done)       Learning Progress Summary            Patient Acceptance, E, VU,NR by MAYRA at 4/3/2025 0920    Comment: UE TE, ADL progression, gait belt use for safety, safety with having someone with him for transfers    Acceptance, E, NR by RON at 3/31/2025 1404                      Point: Home exercise program (Done)       Learning Progress Summary            Patient Acceptance, E, VU,NR by MAYRA at 4/3/2025 0920    Comment: UE TE, ADL progression, gait belt use for safety, safety with having someone with him for transfers                      Point: Precautions (Done)       Learning Progress Summary            Patient Acceptance, E, VU,NR  by MAYRA at 4/3/2025 0920    Comment: UE TE, ADL progression, gait belt use for safety, safety with having someone with him for transfers    Acceptance, E, NR by RON at 3/31/2025 1404                      Point: Body mechanics (In Progress)       Learning Progress Summary            Patient Acceptance, E, NR by RON at 3/31/2025 1404                                      User Key       Initials Effective Dates Name Provider Type Discipline    MAYRA 07/11/23 -  Delmis Bhatt OT Occupational Therapist OT    RON 06/16/21 -  Yue Serna OT Occupational Therapist OT                  OT Recommendation and Plan  Therapy Frequency (OT): daily  Plan of Care Review  Plan of Care Reviewed With: patient  Progress: improving  Outcome Evaluation: Pt. was oriented witihout pain.  Pt. demonstrated a little impulsivity due to feeling more independent today. Pt. competed 4 sit to stands in walker and pivot transfer to recliner SBA to CGA.  Pt. able to v[place]enzo and doff sock independently today.  Will advance to pants next session.  Completed UE TE.  Will benefit from blue tband.  Good progress to goals.     Time Calculation:         Time Calculation- OT       Row Name 04/03/25 0920             Time Calculation- OT    OT Start Time 0811  -MAYRA      OT Received On 04/03/25  -MAYRA      OT Goal Re-Cert Due Date 04/10/25  -         Timed Charges    11457 - OT Therapeutic Exercise Minutes 8  -MAYRA      90738 - OT Therapeutic Activity Minutes 13  -MAYRA      90866 - OT Self Care/Mgmt Minutes 10  -MAYRA         Total Minutes    Timed Charges Total Minutes 31  -MAYRA       Total Minutes 31  -MAYRA                User Key  (r) = Recorded By, (t) = Taken By, (c) = Cosigned By      Initials Name Provider Type    Delmis Garcia OT Occupational Therapist                  Therapy Charges for Today       Code Description Service Date Service Provider Modifiers Qty    [id number]  OT THERAPEUTIC ACT EA 15 MIN 4/3/2025 Delmis Bhatt OT GO 1    [id number]  OT  SELF CARE/MGMT/TRAIN EA 15 MIN 4/3/2025 Delmis Bhatt, OT GO 1                 Delmis Bhatt, OT  4/3/2025

## 2025-04-03 NOTE — DISCHARGE SUMMARY
[facility] Medicine Services  DISCHARGE SUMMARY    Patient Name: Scott Benz  : 1955  MRN: [id number]    Date of Admission: 3/29/2025  8:03 PM  Date of Discharge:  25    Primary Care Physician: Josh Green PA-C    Consults       Date and Time Order Name Status Description    3/29/2025  9:49 PM Inpatient Cardiology Consult Completed             Hospital Course     Presenting Problem: Abnormal labs, shortness of breath     Active Hospital Problems    Diagnosis  POA    **CHF exacerbation [I50.9]  Yes    Arthritis [M19.90]  Yes    Stage 2 chronic kidney disease [N18.2]  Yes    Orthopnea [R06.01]  Unknown    RLQ abdominal pain [R10.31]  Unknown    Chronic idiopathic constipation [K59.04]  Unknown    Dysuria [R30.0]  Unknown    Pneumonia of both lower lobes due to infectious organism [J18.9]  Unknown    Unilateral AKA, right [S78.111A]  Yes    Type 2 diabetes mellitus with hyperglycemia, with long-term current use of insulin [E11.65, Z79.4]  Not Applicable    Dyspnea on exertion [R06.09]  Yes    Essential hypertension [I10]  Yes    Hyperlipidemia [E78.5]  Yes    Diabetic polyneuropathy associated with type 2 diabetes mellitus [E11.42]  Yes      Resolved Hospital Problems    Diagnosis Date Resolved POA    Diabetic nephropathy [E11.21] 2025 Yes          Hospital Course:  Scott Benz is a 69 y.o. male  with past medical hx of CHF, HTN, HLD, CKD, PVD s/p R AKA, PAD s/p fem/tib bypass graft, peripheral neuropathy, T2DM - insulin dependent, CVA, and arthritis who presented on 3/29/2025 due to progressive dyspnea and fatigue. Concern for CHF exacerbation. Admitted for further management.  Previous echocardiogram in  revealed normal EF with mild aortic stenosis.  Repeat echo on 3/30/2025 revealed EF of 36 to 40% with grade 3 diastolic dysfunction and moderate to severe MR.  Cardiology was consulted and performed left heart cath on 3/31/2025 with stents to the ramus and obtuse  marginal.  He was noted to have further coronary disease and cardiology is planning for staged PCI procedure in 2 to 3 weeks as outpatient.     Acute HFrEF  Ischemic Heart Disease  Type 2 MI 2nd to CHF  Possible LBB Myopathy  - Previously had normal EF in 2022 with mild aortic stenosis  - Echo 3/30/25: EF 36-40%, grade 3 diastolic dysfunction, moderate to severe MR  - s/p IV diuresis  - Cardiology consulted, s/p LHC on 3/31 with stents to the ramus and the obtuse marginal, also has disease of the RCA as well as some small vessels.  Recs DAPT x 1 year.    -Plan for follow-up with cardiology in 2 to 3 weeks for staged PCI due to significant residual disease of the distal LAD and RCA  -Started on ASA, Plavix  - Continue entresto, Farxiga, Crestor, Toprol-XL     Possible superimposed bacterial pneumonia  - CXR showing bibasilar infiltrates with small bilateral pleural effusions  - Procal, WBC normal  - Negative urine Strep and Legionella  - Blood cultures - 1 bottle with Staph, suspect contaminant  - s/p Unasyn / Doxycycline, continue Augmentin/Doxy to complete 5 day regimen      Elevated troponin  Prolonged QTc  Tachycardia / PVC's on EKG  - Troponins flat without acute EKG changes  - Denies chest pain     RLQ pain  Chronic constipation  Dysuria  - Continue PPI  - Continue metamucil / miralax  - UA negative for infection     Elevated Cr  CKD 2  - Baseline Cr ~1.0-1.3  - Creatinine 1.23 this AM   - Avoid nephrotoxic meds     HTN   HLD  Hx CVA  - Continue metoprolol  - Continue aspirin / statin / plavix     PAD / Hx of R AKA (related to osteomyelitis)  - PT / OT consult for mobility  - Previously using prosthesis but currently not able to and using wheelchair     T2DM - insulin dependent  Diabetic polyneuropathy  - HbA1c 6.9%  - Resume metformin, glimepiride, Novolin, glargine  - Continue gabapentin for neuropathy    Discharge Follow Up Recommendations for outpatient labs/diagnostics:   Follow up with PCP in 1 week.    Follow up with Cardiology in 2-3 weeks.    Day of Discharge     HPI:   Patient was seen resting in bed no apparent distress.  No acute events overnight per nursing.  He is aware of plan to discharge to [facility] and rehab today for inpatient rehab.  We discussed the importance of taking all medications as prescribed and keeping all recommended follow-up appointments.  He expressed no additional concerns this time    Vital Signs:   Temp:  [96.7 °F (35.9 °C)-98.8 °F (37.1 °C)] 96.7 °F (35.9 °C)  Heart Rate:  [70-85] 85  Resp:  [17-18] 18  BP: ()/(63-82) 120/82  Flow (L/min) (Oxygen Therapy):  [1] 1    Physical Exam:  Constitutional: No acute distress, awake, alert  HENT: NCAT, mucous membranes moist  Respiratory: Clear to auscultation bilaterally, respiratory effort normal   Cardiovascular: RRR, no murmurs, palpable pedal pulses bilaterally, cap refill brisk   Gastrointestinal: Positive bowel sounds, soft, nontender, nondistended  Musculoskeletal: No BLE edema   Psychiatric: Appropriate affect, cooperative  Neurologic: Oriented x 3, moves all extremities, speech clear  Skin: warm, dry, no visible rash       Pertinent  and/or Most Recent Results     LAB RESULTS:      Lab 04/01/25  0418 03/31/25  1204 03/30/25  0826 03/30/25  0101 03/28/25  1005   WBC 7.28 6.52 5.61 7.50 7.54   HEMOGLOBIN 14.4 15.3 14.8 14.6 14.5   HEMATOCRIT 44.6 47.4 44.9 45.3 45.8   PLATELETS 242 247 228 258 262   NEUTROS ABS  --   --  3.17 4.31 5.28   IMMATURE GRANS (ABS)  --   --  0.01 0.02 0.02   LYMPHS ABS  --   --  1.71 2.30 1.59   MONOS ABS  --   --  0.55 0.68 0.52   EOS ABS  --   --  0.12 0.13 0.07   MCV 93.1 92.6 93.3 92.3 94.0   PROCALCITONIN  --   --   --  0.05  --    LACTATE  --   --   --  1.2  --    D DIMER QUANT  --   --   --  0.52  --          Lab 04/03/25  1043 04/02/25  0443 04/01/25  1151 04/01/25  0418 03/31/25  2344 03/31/25  1204 03/30/25  0826 03/30/25  0101 03/29/25  1437   SODIUM 137 139  --  141  --  143 143 141   --    POTASSIUM 4.6 4.2 4.6 3.8 4.0 3.8 4.2 3.8  --    CHLORIDE 105 102  --  101  --  98 103 101  --    CO2 21.0* 22.0  --  26.0  --  29.0 25.0 26.0  --    ANION GAP 11.0 15.0  --  14.0  --  16.0* 15.0 14.0  --    BUN 28* 23  --  28*  --  30* 26* 27*  --    CREATININE 1.23 1.10  --  1.42*  --  1.41* 0.95 1.09  --    EGFR 63.6 72.7  --  53.5*  --  53.9* 86.6 73.5  --    GLUCOSE 320* 183*  --  174*  --  181* 158* 193*  --    CALCIUM 8.8 8.6  --  8.5*  --  9.4 8.8 9.0  --    MAGNESIUM 2.3  --   --   --   --   --   --  2.0 2   PHOSPHORUS  --  3.0  --   --   --   --   --   --   --    HEMOGLOBIN A1C  --   --   --   --   --   --   --  6.90*  --    TSH  --   --   --   --   --   --   --  2.530  --          Lab 04/02/25 0443 03/31/25  1204 03/30/25 0101 03/28/25  1005   TOTAL PROTEIN  --  7.1 7.0 6.9   ALBUMIN 3.6 4.1 4.0 4.0   GLOBULIN  --  3.0 3.0 2.9   ALT (SGPT)  --  16 23 35   AST (SGOT)  --  16 19 32   BILIRUBIN  --  0.8 0.8 0.5   ALK PHOS  --  90 80 86   LIPASE  --   --  17 20         Lab 04/02/25 0443 03/30/25  0826 03/30/25 0101 03/28/25  1005   PROBNP 1,093.0*  --  2,477.0* 1,915.0*   HSTROP T  --  41* 39*  --          Lab 03/30/25  0826   CHOLESTEROL 167   LDL CHOL 110*   HDL CHOL 32*   TRIGLYCERIDES 137             Brief Urine Lab Results  (Last result in the past 365 days)        Color   Clarity   Blood   Leuk Est   Nitrite   Protein   CREAT   Urine HCG        03/29/25 2258 Yellow   Clear   Trace   Moderate (2+)   Negative   Negative                 Microbiology Results (last 10 days)       Procedure Component Value - Date/Time    Blood Culture - Blood, Arm, Right [[id number]]  (Normal) Collected: 03/30/25 0101    Lab Status: Preliminary result Specimen: Blood from Arm, Right Updated: 04/03/25 0730     Blood Culture No growth at 4 days    Narrative:      Less than seven (7) mL's of blood was collected.  Insufficient quantity may yield false negative results.    Blood Culture - Blood, Arm, Left [[id number]]   (Abnormal) Collected: 03/30/25 0101    Lab Status: Final result Specimen: Blood from Arm, Left Updated: 04/01/25 0611     Blood Culture Staphylococcus, coagulase negative     Isolated from Aerobic Bottle     Gram Stain Aerobic Bottle Gram positive cocci in groups    Narrative:      Probable contaminant requires clinical correlation, susceptibility not performed unless requested by physician.      Less than seven (7) mL's of blood was collected.  Insufficient quantity may yield false negative results.    Blood Culture ID, PCR - Blood, Arm, Left [[id number]]  (Abnormal) Collected: 03/30/25 0101    Lab Status: Final result Specimen: Blood from Arm, Left Updated: 03/31/25 1208     BCID, PCR Staph spp, not aureus or lugdunensis. Identification by BCID2 PCR.     BOTTLE TYPE Aerobic Bottle    S. Pneumo Ag Urine or CSF - Urine, Urine, Clean Catch [[id number]]  (Normal) Collected: 03/29/25 2258    Lab Status: Final result Specimen: Urine, Clean Catch Updated: 03/30/25 1306     Strep Pneumo Ag Negative    Legionella Antigen, Urine - Urine, Urine, Clean Catch [[id number]]  (Normal) Collected: 03/29/25 2258    Lab Status: Final result Specimen: Urine, Clean Catch Updated: 03/30/25 1306     LEGIONELLA ANTIGEN, URINE Negative    Respiratory Panel PCR w/COVID-19(SARS-CoV-2) RANULFO/SUSY/MANUEL/PAD/COR/PÉREZ In-House, NP Swab in UTM/VTM, 2 HR TAT - Swab, Nasopharynx [[id number]]  (Normal) Collected: 03/29/25 2256    Lab Status: Final result Specimen: Swab from Nasopharynx Updated: 03/30/25 0031     ADENOVIRUS, PCR Not Detected     Coronavirus 229E Not Detected     Coronavirus HKU1 Not Detected     Coronavirus NL63 Not Detected     Coronavirus OC43 Not Detected     COVID19 Presumptive Negative     Human Metapneumovirus Not Detected     Human Rhinovirus/Enterovirus Not Detected     Influenza A PCR Not Detected     Influenza A H1 Not Detected     Influenza A H1 2009 PCR Not Detected     Influenza A H3 Not Detected     Influenza B PCR Not Detected      Parainfluenza Virus 1 Not Detected     Parainfluenza Virus 2 Not Detected     Parainfluenza Virus 3 Not Detected     Parainfluenza Virus 4 Not Detected     RSV, PCR Not Detected     Bordetella pertussis pcr Not Detected     Bordetella parapertussis PCR Not Detected     Chlamydophila pneumoniae PCR Not Detected     Mycoplasma pneumo by PCR Not Detected    Narrative:      In the setting of a positive respiratory panel with a viral infection PLUS a negative procalcitonin without other underlying concern for bacterial infection, consider observing off antibiotics or discontinuation of antibiotics and continue supportive care. If the respiratory panel is positive for atypical bacterial infection (Bordetella pertussis, Chlamydophila pneumoniae, or Mycoplasma pneumoniae), consider antibiotic de-escalation to target atypical bacterial infection.    MRSA Screen, PCR (Inpatient) - Swab, Nares [[id number]]  (Normal) Collected: 03/29/25 7090    Lab Status: Final result Specimen: Swab from Nares Updated: 03/30/25 0755     MRSA PCR Negative    Narrative:      The negative predictive value of this diagnostic test is high and should only be used to consider de-escalating anti-MRSA therapy. A positive result may indicate colonization with MRSA and must be correlated clinically.  MRSA Negative            Cardiac Catheterization/Vascular Study  Addendum Date: 4/2/2025  FINAL IMPRESSION: Severe diffuse multivessel disease as follows: Mild to moderate disease of the proximal to mid LAD with 70% stenosis of the distal LAD and 60% stenosis of the diagonal branch of the LAD. Total occlusion of the ramus intermedius. 90% stenosis of the ostial circumflex and 95% diffuse stenosis of the small to medium sized first marginal branch of the circumflex. 50% stenosis of the proximal RCA, 80% stenosis of the distal RCA and 80% stenosis of the right PDA. Diffuse disease of small branches of the posterolateral branch of the RCA. Successful  PTCA/stenting of the ramus intermedius with 2.25 x 28 mm THOR proximally optimized to 2.5 mm reducing 100% stenosis to 0%. Successful PTCA/stenting of the ostial circumflex with 2.5 x 12 mm THOR reducing 90% stenosis to 0%. Successful balloon PTCA of the marginal branch of the circumflex with 2.0 mm balloon reducing diffuse 90% stenosis to less than 40 to 50%. RECOMMENDATIONS: Dual antiplatelet therapy for 1 year. Optimize medical therapy and risk factor management. Consideration of staged PCI of the RCA and the LAD. Indications: Non-STEMI/acute HFrEF. Access: Right radial. Procedures: Left heart catheterization. Left ventriculogram. Selective coronary angiography. Arterial site hemostasis with radial band. Procedure narrative: The patient was brought to the catheterization lab in a fasting condition.  Access site was prepped and draped in standard sterile fashion.  Lidocaine was injected and arterial access was obtained by percutaneous anterior wall puncture technique.  A 6 [place] arterial sheath was placed. Above procedures were performed without complications.  Following the angiograms Angiomax was started.  The left coronary artery was engaged with EBU 3 guide catheter.  A Fielder XT wire was advanced into the ramus intermedius and the total occlusion was crossed.  A versa turn wire was advanced into the circumflex.  Balloon PTCA of the ramus intermedius was performed over a long total occlusion.  Subsequently it was stented with a 2.25 x 28 mm THOR which was fully deployed and postdilated with a 2.5 mm NC balloon at higher pressures.  Satisfactory expansion was noted with 0% residual stenosis and no evidence of complications.  The mid flow was grade 0 before PCI and grade 3 after PCI.  At this time balloon PTCA of the first marginal right of the circumflex was performed with a 2.0 mm balloon.  Multiple 32nd inflations were made.  The 95% stenosis was reduced to less than 40 to 50%.  Due to small vessel caliber,  diffuse disease and risk of jailing the second marginal stenting of this vessel was not performed.  At this time balloon PTCA of the ostial circumflex was performed with the same 2.0 mm balloon.  The ostium of the left circumflex was then stented with a 2.5 x 12 mm THOR which was fully deployed and postdilated.  The ramus intermedius was rewired through the stent struts and stent struts and balloon PTCA was performed.  Subsequently the circumflex stent was postdilated with 2.5 mm NC balloon at high pressures.  Satisfactory results are achieved with 0% residual stenosis at the site.  JULISSA flow was grade 3 before and after PCI in the circumflex. Finally the arterial sheath was removed and hemostasis was achieved.  The patient was transferred to the unit in a stable condition. Angiographic Findings: Right coronary dominance. LM: Mild plaque, no significant disease. LAD: Mild, 30 to 40% smooth old mid segment plaque.  The distal LAD has a 70% stenosis.  A medium size diagonal branch has a 60% mid vessel stenosis. Ramus intermedius: Medium size vessel which was totally occluded proximally.  It was successfully stented with 2.25 x 28 mm THOR which was proximally optimized to 2.5 mm and the 100% stenosis was reduced to 0%. LCX: 90% ostial stenosis which was stented with 2.5 x 12 mm THOR and reduced to 0%.  The first marginal had a long diffuse 95% stenosis which was treated with balloon PTCA and reduced to less than 50%.  Due to small vessel caliber and long segment of disease and to avoid jailing of the second marginal stenting of this vessel was not performed.  The second marginal has mild nonobstructive plaque without occlusive disease. RCA: Dominant vessel with mild diffuse plaque from proximal to mid vessel.  There is a 40 to 50% proximal stenosis and an 80% focal distal stenosis.  The PDA has a 80% stenosis in its distal segment before reaching the apex.  The posterolateral branch gives off 3 branches.  The first branch  has diffuse disease.  The second branch has mild plaque.  The third small branch has a 80% stenosis.  These small vessels are not suitable for PCI. Complications: No acute procedure related complications.     Result Date: 4/2/2025  FINAL     Severe diffuse multivessel disease as follows: Mild to moderate disease of the proximal to mid LAD with 70% stenosis of the distal LAD and 60% stenosis of the diagonal branch of the LAD. Total occlusion of the ramus intermedius. 90% stenosis of the ostial circumflex and 95% diffuse stenosis of the small to medium sized first marginal branch of the circumflex. 50% stenosis of the proximal RCA, 80% stenosis of the distal RCA and 80% stenosis of the right PDA. Diffuse disease of small branches of the posterolateral branch of the RCA. Successful PTCA/stenting of the ramus intermedius with 2.25 x 28 mm THOR proximally optimized to 2.5 mm reducing 100% stenosis 0%. Successful PTCA/stenting of the ostial circumflex with 2.5 x 12 mm THOR reducing 90% stenosis 0%. Successful balloon PTCA of the marginal branch of the circumflex with 2.0 mm balloon reducing diffuse 90% stenosis to less than 40 to 50%. RECOMMENDATIONS: Dual antiplatelet therapy for 1 year. Optimize medical therapy and risk factor management. Consideration of staged PCI of the RCA and the LAD. Indications: Non-STEMI/acute HFrEF. Access: Right radial. Procedures: Left heart catheterization. Left ventriculogram. Selective coronary angiography. Arterial site hemostasis with radial band. Procedure narrative: The patient was brought to the catheterization lab in a fasting condition.  Access site was prepped and draped in standard sterile fashion.  Lidocaine was injected and arterial access was obtained by percutaneous anterior wall puncture technique.  A 6 [place] arterial sheath was placed. Above procedures were performed without complications.  Following the angiograms Angiomax was started.  The left coronary artery was engaged  with EBU 3 guide catheter.  A Fielder XT wire was advanced into the ramus intermedius and the total occlusion was crossed.  A versa turn wire was advanced into the circumflex.  Balloon PTCA of the ramus intermedius was performed over a long total occlusion.  Subsequently it was stented with a 2.25 x 28 mm THOR which was fully deployed and postdilated with a 2.5 mm NC balloon at higher pressures.  Satisfactory expansion was noted with 0% residual stenosis and no evidence of complications.  The mid flow was grade 0 before PCI and grade 3 after PCI.  At this time balloon PTCA of the first marginal right of the circumflex was performed with a 2.0 mm balloon.  Multiple 32nd inflations were made.  The 95% stenosis was reduced to less than 40 to 50%.  Due to small vessel caliber, diffuse disease and risk of jailing the second marginal stenting of this vessel was not performed.  At this time balloon PTCA of the ostial circumflex was performed with the same 2.0 mm balloon.  The ostium of the left circumflex was then stented with a 2.5 x 12 mm THOR which was fully deployed and postdilated.  The ramus intermedius was rewired through the stent struts and stent struts and balloon PTCA was performed.  Subsequently the circumflex stent was postdilated with 2.5 mm NC balloon at high pressures.  Satisfactory results are achieved with 0% residual stenosis at the site.  JULISSA flow was grade 3 before and after PCI in the circumflex. Finally the arterial sheath was removed and hemostasis was achieved.  The patient was transferred to the unit in a stable condition. Angiographic Findings: Right coronary dominance. LM: Mild plaque, no significant disease. LAD: Mild, 30 to 40% smooth old mid segment plaque.  The distal LAD has a 70% stenosis.  A medium size diagonal branch has a 60% mid vessel stenosis. Ramus intermedius: Medium size vessel which was totally occluded proximally.  It was successfully stented with 2.25 x 28 mm THOR which was  proximally optimized to 2.5 mm and the 100% stenosis was reduced to 0%. LCX: 90% ostial stenosis which was stented with 2.5 x 12 mm THOR and reduced to 0%.  The first marginal had a long diffuse 95% stenosis which was treated with balloon PTCA and reduced to less than 50%.  Due to small vessel caliber and long segment of disease and to avoid jailing of the second marginal stenting of this vessel was not performed.  The second marginal has mild nonobstructive plaque without occlusive disease. RCA: Dominant vessel with mild diffuse plaque from proximal to mid vessel.  There is a 40 to 50% proximal stenosis and an 80% focal distal stenosis.  The PDA has a 80% stenosis in its distal segment before reaching the apex.  The posterolateral branch gives off 3 branches.  The first branch has diffuse disease.  The second branch has mild plaque.  The third small branch has a 80% stenosis.  These small vessels are not suitable for PCI. Complications: No acute procedure related complications.     XR Chest 1 View  Result Date: 3/31/2025  XR CHEST 1 VW Date of Exam: 3/31/2025 2:52 AM EDT Indication: DYSPNEA, ON EXERTION Comparison: 3/29/2025 Findings: Heart is enlarged. There is some mild vascular congestion. There are persistent bibasilar infiltrates that are similar to prior. Small bilateral pleural effusions are probably present as well. No pneumothorax is seen.     1.Cardiomegaly with mild vascular congestion. 2.Bibasilar infiltrates and small pleural effusions, similar to prior. Electronically Signed: Ramon Cuenca MD  3/31/2025 5:56 AM EDT  Workstation ID: [id number]    XR Chest 1 View  Result Date: 3/30/2025  PORTABLE CHEST  3/29/2025 3:32 PM HISTORY: CHF COMPARISON:  February 3, 2025 FINDINGS: The cardiac silhouette is stable The mediastinal and hilar contours are unremarkable.  There is slight prominence of the pulmonary vasculature. There is a right base opacity, correlate for infiltrate versus atelectasis. There is no  pneumothorax. The visualized osseous structures demonstrate no acute abnormalities.    Right base opacity, correlate for atelectasis versus infiltrate. Images reviewed, interpreted, and dictated by Dr. Ramon Palacio. Transcribed by Nasima JIMENEZ (FREDDY), OLIVER (R).    XR Chest 1 View  Result Date: 3/29/2025  XR CHEST 1 VW, XR ABDOMEN KUB Date of Exam: 3/29/2025 9:38 PM EDT Indication: CHF exacerbation; dyspnea abdominal pain Comparison: 6/14/2022 Findings: Patchy airspace disease is seen within the bilateral lower lobes, right greater than left. Small bilateral pleural effusions are present, right greater than left.. No pneumothorax. The pulmonary vasculature appears within normal limits. The cardiac and mediastinal silhouette appear unremarkable. No acute osseous abnormality identified. No gross free air or pneumatosis though evaluation is slightly limited due to technique. There is a non obstructive bowel gas pattern. A mild to moderate stool burden is present.  No suspicious calcifications identified. No acute osseous abnormality identified.     Impression: 1.Bibasilar pneumonia, right greater than left with small bilateral pleural effusions, right greater than left. 2.Nonobstructive bowel gas pattern. Mild to moderate stool burden present. Electronically Signed: Jacquelyn Woodson MD  3/29/2025 10:02 PM EDT  Workstation ID: [id number]    XR Abdomen KUB  Result Date: 3/29/2025  XR CHEST 1 VW, XR ABDOMEN KUB Date of Exam: 3/29/2025 9:38 PM EDT Indication: CHF exacerbation; dyspnea abdominal pain Comparison: 6/14/2022 Findings: Patchy airspace disease is seen within the bilateral lower lobes, right greater than left. Small bilateral pleural effusions are present, right greater than left.. No pneumothorax. The pulmonary vasculature appears within normal limits. The cardiac and mediastinal silhouette appear unremarkable. No acute osseous abnormality identified. No gross free air or pneumatosis though evaluation is slightly  limited due to technique. There is a non obstructive bowel gas pattern. A mild to moderate stool burden is present.  No suspicious calcifications identified. No acute osseous abnormality identified.     Impression: 1.Bibasilar pneumonia, right greater than left with small bilateral pleural effusions, right greater than left. 2.Nonobstructive bowel gas pattern. Mild to moderate stool burden present. Electronically Signed: Jacquelyn Woodson MD  3/29/2025 10:02 PM EDT  Workstation ID: [id number]      Results for orders placed during the hospital encounter of 07/19/19    Doppler Arterial Multi Level Lower Extremity - Bilateral CAR    Interpretation Summary  · Right Conclusion: The right GURVINDER is severely reduced. Waveforms are consistent with femoral disease.  · Left Conclusion: The left GURVINDER is normal.      Results for orders placed during the hospital encounter of 07/19/19    Doppler Arterial Multi Level Lower Extremity - Bilateral CAR    Interpretation Summary  · Right Conclusion: The right GURVINDER is severely reduced. Waveforms are consistent with femoral disease.  · Left Conclusion: The left GURVINDER is normal.      Results for orders placed during the hospital encounter of 03/29/25    Adult Transthoracic Echo Complete With Contrast if Necessary Per Protocol    Interpretation Summary    Left ventricular ejection fraction appears to be 36 - 40%.    Left ventricular wall thickness is consistent with mild to moderate concentric hypertrophy.    There is global left ventricular hypokinesis    Left ventricular diastolic function is consistent with (grade III w/high LAP) reversible restrictive pattern.    Mild aortic valve stenosis is present.    Moderate to severe mitral valve regurgitation is present with an eccentric jet noted.    There is a right pleural effusion.      Plan for Follow-up of Pending Labs/Results: Follow-up as directed  Pending Labs       Order Current Status    Blood Culture - Blood, Arm, Right Preliminary result           Discharge Details        Discharge Medications        New Medications        Instructions Start Date   amoxicillin-clavulanate 875-125 MG per tablet  Commonly known as: AUGMENTIN   1 tablet, Oral, 2 Times Daily      bumetanide 0.5 MG tablet  Commonly known as: BUMEX   0.5 mg, Oral, Daily   Start Date: April 4, 2025     clopidogrel 75 MG tablet  Commonly known as: PLAVIX   75 mg, Oral, Daily   Start Date: April 4, 2025     doxycycline 100 MG capsule  Commonly known as: MONODOX   100 mg, Oral, Every 12 Hours Scheduled      metoprolol succinate XL 50 MG 24 hr tablet  Commonly known as: TOPROL-XL   50 mg, Oral, Every 24 Hours Scheduled   Start Date: April 4, 2025     pantoprazole 40 MG EC tablet  Commonly known as: PROTONIX  Replaces: omeprazole 40 MG capsule   40 mg, Oral, Every Early Morning   Start Date: April 4, 2025     Psyllium 51.7 % packet  Commonly known as: METAMUCIL MULTIHEALTH FIBER  Replaces: psyllium 58.6 % packet   1 packet, Oral, Daily   Start Date: April 4, 2025     sacubitril-valsartan 24-26 MG tablet  Commonly known as: ENTRESTO   1 tablet, Oral, Every 12 Hours Scheduled             Changes to Medications        Instructions Start Date   polyethylene glycol 17 g packet  Commonly known as: MiraLax  What changed:   medication strength  how much to take   17 g, Oral, Daily   Start Date: April 4, 2025            Continue These Medications        Instructions Start Date   aspirin 325 MG tablet   162 mg, Oral, Daily   Start Date: April 4, 2025     Farxiga 10 MG tablet  Generic drug: dapagliflozin Propanediol   10 mg, Oral, Daily      gabapentin 600 MG tablet  Commonly known as: NEURONTIN   600 mg, Oral, 3 Times Daily      glimepiride 4 MG tablet  Commonly known as: AMARYL   4 mg, 2 Times Daily With Meals      metFORMIN 1000 MG tablet  Commonly known as: GLUCOPHAGE   1,000 mg, 2 Times Daily With Meals      NovoLIN R 100 UNIT/ML injection  Generic drug: insulin regular   Subcutaneous, 3 Times Daily Before  Meals, Per sliding scale.      potassium chloride 10 MEQ CR tablet  Commonly known as: KLOR-CON M10   10 mEq, Oral, Every Morning, Take with furosemide.      rosuvastatin 20 MG tablet  Commonly known as: CRESTOR   20 mg, Daily      Towaldemar Max SoloStar 300 UNIT/ML solution pen-injector injection  Generic drug: Insulin Glargine (2 Unit Dial)   20 Units, Subcutaneous, Daily             Stop These Medications      furosemide 20 MG tablet  Commonly known as: Lasix     metoprolol tartrate 25 MG tablet  Commonly known as: LOPRESSOR     omeprazole 40 MG capsule  Commonly known as: priLOSEC  Replaced by: pantoprazole 40 MG EC tablet     psyllium 58.6 % packet  Commonly known as: METAMUCIL  Replaced by: Psyllium 51.7 % packet              No Known Allergies      Discharge Disposition: Inpatient rehab at [facility] and rehab  Rehab Facility or Unit (DC - External)    Diet:  Hospital:  Diet Order   Procedures    Diet: Cardiac; Healthy Heart (2-3 Na+); Fluid Consistency: Thin (IDDSI 0)       Diet Instructions       Diet: Regular/House Diet, Cardiac Diets, Diabetic Diets; Healthy Heart (2-3 Na+); Regular (IDDSI 7); Thin (IDDSI 0); Consistent Carbohydrate      Discharge Diet:  Regular/House Diet  Cardiac Diets  Diabetic Diets       Cardiac Diet: Healthy Heart (2-3 Na+)    Texture: Regular (IDDSI 7)    Fluid Consistency: Thin (IDDSI 0)    Diabetic Diet: Consistent Carbohydrate             Activity: As tolerated  Activity Instructions       Activity as Tolerated                   CODE STATUS:    Code Status and Medical Interventions: CPR (Attempt to Resuscitate); Full Support   Ordered at: 03/29/25 0190     Code Status (Patient has no pulse and is not breathing):    CPR (Attempt to Resuscitate)     Medical Interventions (Patient has pulse or is breathing):    Full Support       Future Appointments   Date Time Provider Department Center   4/21/2025 10:15 AM Josh Green PA-C [facility]   8/12/2025  8:30 AM Ashlee Da Silva,  MD [facility]       Additional Instructions for the Follow-ups that You Need to Schedule       Discharge Follow-up with PCP   As directed       Currently Documented PCP:    Josh Green PA-C    PCP Phone Number:    [phone]     Follow Up Details: Follow-up with PCP in 1 week        Discharge Follow-up with Specified Provider: Follow-up with cardiology in 2 to 3 weeks   As directed      To: Follow-up with cardiology in 2 to 3 weeks                      TOMÁS Buckner  04/03/25      Time Spent on Discharge:  I spent  43  minutes on this discharge activity which included: face-to-face encounter with the patient, reviewing the data in the system, coordination of the care with the nursing staff as well as consultants, documentation, and entering orders.

## 2025-04-03 NOTE — PLAN OF CARE
Goal Outcome Evaluation:  Plan of Care Reviewed With: patient        Progress: improving  Outcome Evaluation: Pt. was oriented witihout pain.  Pt. demonstrated a little impulsivity due to feeling more independent today. Pt. competed 4 sit to stands in walker and pivot transfer to recliner SBA to CGA.  Pt. able to vincenzo and doff sock independently today.  Will advance to pants next session.  Completed UE TE.  Will benefit from blue tband.  Good progress to goals.    Anticipated Discharge Disposition (OT): inpatient rehabilitation facility

## 2025-04-04 LAB — BACTERIA SPEC AEROBE CULT: NORMAL

## 2025-04-08 DIAGNOSIS — I25.118 CORONARY ARTERY DISEASE INVOLVING NATIVE CORONARY ARTERY OF NATIVE HEART WITH OTHER FORM OF ANGINA PECTORIS: Primary | ICD-10-CM

## 2025-04-08 RX ORDER — SODIUM CHLORIDE 0.9 % (FLUSH) 0.9 %
10 SYRINGE (ML) INJECTION AS NEEDED
OUTPATIENT
Start: 2025-04-08

## 2025-04-08 RX ORDER — ASPIRIN 81 MG/1
324 TABLET, CHEWABLE ORAL ONCE
OUTPATIENT
Start: 2025-04-08 | End: 2025-04-08

## 2025-04-08 RX ORDER — ASPIRIN 81 MG/1
81 TABLET ORAL DAILY
OUTPATIENT
Start: 2025-04-09

## 2025-04-08 RX ORDER — SODIUM CHLORIDE 0.9 % (FLUSH) 0.9 %
10 SYRINGE (ML) INJECTION EVERY 12 HOURS SCHEDULED
OUTPATIENT
Start: 2025-04-08

## 2025-04-08 RX ORDER — SODIUM CHLORIDE 9 MG/ML
40 INJECTION, SOLUTION INTRAVENOUS AS NEEDED
OUTPATIENT
Start: 2025-04-08

## 2025-04-16 ENCOUNTER — HOSPITAL ENCOUNTER (OUTPATIENT)
Facility: HOSPITAL | Age: 70
Setting detail: HOSPITAL OUTPATIENT SURGERY
Discharge: HOME | End: 2025-04-16
Attending: INTERNAL MEDICINE | Admitting: INTERNAL MEDICINE
Payer: COMMERCIAL

## 2025-04-16 VITALS
WEIGHT: 193.2 LBS | DIASTOLIC BLOOD PRESSURE: 76 MMHG | BODY MASS INDEX: 29.28 KG/M2 | HEIGHT: 68 IN | SYSTOLIC BLOOD PRESSURE: 117 MMHG | OXYGEN SATURATION: 93 % | TEMPERATURE: 97.6 F | HEART RATE: 95 BPM | RESPIRATION RATE: 16 BRPM

## 2025-04-16 DIAGNOSIS — I25.118 CORONARY ARTERY DISEASE INVOLVING NATIVE CORONARY ARTERY OF NATIVE HEART WITH OTHER FORM OF ANGINA PECTORIS: ICD-10-CM

## 2025-04-16 DIAGNOSIS — R07.89 CHEST PAIN, ATYPICAL: ICD-10-CM

## 2025-04-16 LAB
DEPRECATED RDW RBC AUTO: 47.8 FL (ref 37–54)
ERYTHROCYTE [DISTWIDTH] IN BLOOD BY AUTOMATED COUNT: 14.1 % (ref 12.3–15.4)
HCT VFR BLD AUTO: 48.2 % (ref 37.5–51)
HGB BLD-MCNC: 15.6 G/DL (ref 13–17.7)
MCH RBC QN AUTO: 29.9 PG (ref 26.6–33)
MCHC RBC AUTO-ENTMCNC: 32.4 G/DL (ref 31.5–35.7)
MCV RBC AUTO: 92.3 FL (ref 79–97)
PLATELET # BLD AUTO: 260 10*3/MM3 (ref 140–450)
PMV BLD AUTO: 11 FL (ref 6–12)
QT INTERVAL: 412 MS
QTC INTERVAL: 512 MS
RBC # BLD AUTO: 5.22 10*6/MM3 (ref 4.14–5.8)
WBC NRBC COR # BLD AUTO: 10.22 10*3/MM3 (ref 3.4–10.8)

## 2025-04-16 PROCEDURE — C1887 CATHETER, GUIDING: HCPCS | Performed by: INTERNAL MEDICINE

## 2025-04-16 PROCEDURE — 25010000002 HEPARIN (PORCINE) PER 1000 UNITS: Performed by: INTERNAL MEDICINE

## 2025-04-16 PROCEDURE — 25510000001 IOPAMIDOL PER 1 ML: Performed by: INTERNAL MEDICINE

## 2025-04-16 PROCEDURE — 36415 COLL VENOUS BLD VENIPUNCTURE: CPT

## 2025-04-16 PROCEDURE — C1725 CATH, TRANSLUMIN NON-LASER: HCPCS | Performed by: INTERNAL MEDICINE

## 2025-04-16 PROCEDURE — 25010000002 MIDAZOLAM PER 1 MG: Performed by: INTERNAL MEDICINE

## 2025-04-16 PROCEDURE — 85027 COMPLETE CBC AUTOMATED: CPT | Performed by: INTERNAL MEDICINE

## 2025-04-16 PROCEDURE — 85347 COAGULATION TIME ACTIVATED: CPT

## 2025-04-16 PROCEDURE — 25810000003 SODIUM CHLORIDE 0.9 % SOLUTION: Performed by: INTERNAL MEDICINE

## 2025-04-16 PROCEDURE — C1874 STENT, COATED/COV W/DEL SYS: HCPCS | Performed by: INTERNAL MEDICINE

## 2025-04-16 PROCEDURE — C1769 GUIDE WIRE: HCPCS | Performed by: INTERNAL MEDICINE

## 2025-04-16 PROCEDURE — 25810000003 SODIUM CHLORIDE 0.9 % SOLUTION

## 2025-04-16 PROCEDURE — 25010000002 PHENYLEPHRINE 10 MG/ML SOLUTION: Performed by: INTERNAL MEDICINE

## 2025-04-16 PROCEDURE — 25010000002 NICARDIPINE 2.5 MG/ML SOLUTION: Performed by: INTERNAL MEDICINE

## 2025-04-16 PROCEDURE — 93005 ELECTROCARDIOGRAM TRACING: CPT | Performed by: INTERNAL MEDICINE

## 2025-04-16 PROCEDURE — 25010000002 LIDOCAINE PF 1% 1 % SOLUTION: Performed by: INTERNAL MEDICINE

## 2025-04-16 PROCEDURE — C9600 PERC DRUG-EL COR STENT SING: HCPCS | Performed by: INTERNAL MEDICINE

## 2025-04-16 PROCEDURE — C1894 INTRO/SHEATH, NON-LASER: HCPCS | Performed by: INTERNAL MEDICINE

## 2025-04-16 DEVICE — XIENCE SKYPOINT™ EVEROLIMUS ELUTING CORONARY STENT SYSTEM 3.00 MM X 23 MM / RAPID-EXCHANGE
Type: IMPLANTABLE DEVICE | Site: CORONARY | Status: FUNCTIONAL
Brand: XIENCE SKYPOINT™

## 2025-04-16 DEVICE — STNT CORNRY RESOLUTE ONYX RX 2X22MM: Type: IMPLANTABLE DEVICE | Site: CORONARY | Status: FUNCTIONAL

## 2025-04-16 RX ORDER — HEPARIN SODIUM 1000 [USP'U]/ML
INJECTION, SOLUTION INTRAVENOUS; SUBCUTANEOUS
Status: DISCONTINUED | OUTPATIENT
Start: 2025-04-16 | End: 2025-04-16 | Stop reason: HOSPADM

## 2025-04-16 RX ORDER — SODIUM CHLORIDE 0.9 % (FLUSH) 0.9 %
10 SYRINGE (ML) INJECTION AS NEEDED
Status: DISCONTINUED | OUTPATIENT
Start: 2025-04-16 | End: 2025-04-16 | Stop reason: HOSPADM

## 2025-04-16 RX ORDER — LIDOCAINE HYDROCHLORIDE 10 MG/ML
INJECTION, SOLUTION EPIDURAL; INFILTRATION; INTRACAUDAL; PERINEURAL
Status: DISCONTINUED | OUTPATIENT
Start: 2025-04-16 | End: 2025-04-16 | Stop reason: HOSPADM

## 2025-04-16 RX ORDER — ASPIRIN 325 MG
325 TABLET, DELAYED RELEASE (ENTERIC COATED) ORAL EVERY 6 HOURS PRN
COMMUNITY
End: 2025-04-16 | Stop reason: HOSPADM

## 2025-04-16 RX ORDER — ASPIRIN 81 MG/1
324 TABLET, CHEWABLE ORAL ONCE
Status: COMPLETED | OUTPATIENT
Start: 2025-04-16 | End: 2025-04-16

## 2025-04-16 RX ORDER — NITROGLYCERIN 0.4 MG/1
0.4 TABLET SUBLINGUAL
Status: DISCONTINUED | OUTPATIENT
Start: 2025-04-16 | End: 2025-04-16 | Stop reason: HOSPADM

## 2025-04-16 RX ORDER — MIDAZOLAM HYDROCHLORIDE 1 MG/ML
INJECTION, SOLUTION INTRAMUSCULAR; INTRAVENOUS
Status: DISCONTINUED | OUTPATIENT
Start: 2025-04-16 | End: 2025-04-16 | Stop reason: HOSPADM

## 2025-04-16 RX ORDER — BUMETANIDE 0.5 MG/1
0.5 TABLET ORAL DAILY
Qty: 30 TABLET | Refills: 2 | Status: SHIPPED | OUTPATIENT
Start: 2025-04-16

## 2025-04-16 RX ORDER — CLOPIDOGREL BISULFATE 75 MG/1
75 TABLET ORAL DAILY
Qty: 90 TABLET | Refills: 3 | Status: SHIPPED | OUTPATIENT
Start: 2025-04-16

## 2025-04-16 RX ORDER — CLOPIDOGREL BISULFATE 75 MG/1
75 TABLET ORAL DAILY
Qty: 90 TABLET | Refills: 3 | Status: SHIPPED | OUTPATIENT
Start: 2025-04-16 | End: 2025-04-16 | Stop reason: HOSPADM

## 2025-04-16 RX ORDER — SODIUM CHLORIDE 9 MG/ML
40 INJECTION, SOLUTION INTRAVENOUS AS NEEDED
Status: DISCONTINUED | OUTPATIENT
Start: 2025-04-16 | End: 2025-04-16 | Stop reason: HOSPADM

## 2025-04-16 RX ORDER — ASPIRIN 81 MG/1
81 TABLET ORAL DAILY
Qty: 100 TABLET | Refills: 3 | Status: SHIPPED | OUTPATIENT
Start: 2025-04-16

## 2025-04-16 RX ORDER — DOXYCYCLINE 100 MG/1
100 CAPSULE ORAL 2 TIMES DAILY
COMMUNITY

## 2025-04-16 RX ORDER — ONDANSETRON 4 MG/1
4 TABLET, FILM COATED ORAL EVERY 6 HOURS PRN
COMMUNITY

## 2025-04-16 RX ORDER — SODIUM CHLORIDE 9 MG/ML
100 INJECTION, SOLUTION INTRAVENOUS CONTINUOUS
Status: DISCONTINUED | OUTPATIENT
Start: 2025-04-16 | End: 2025-04-16 | Stop reason: HOSPADM

## 2025-04-16 RX ORDER — ASPIRIN 81 MG/1
81 TABLET ORAL DAILY
Status: DISCONTINUED | OUTPATIENT
Start: 2025-04-17 | End: 2025-04-16 | Stop reason: HOSPADM

## 2025-04-16 RX ORDER — SODIUM CHLORIDE 0.9 % (FLUSH) 0.9 %
10 SYRINGE (ML) INJECTION EVERY 12 HOURS SCHEDULED
Status: DISCONTINUED | OUTPATIENT
Start: 2025-04-16 | End: 2025-04-16 | Stop reason: HOSPADM

## 2025-04-16 RX ORDER — IOPAMIDOL 755 MG/ML
INJECTION, SOLUTION INTRAVASCULAR
Status: DISCONTINUED | OUTPATIENT
Start: 2025-04-16 | End: 2025-04-16 | Stop reason: HOSPADM

## 2025-04-16 RX ORDER — CLOPIDOGREL BISULFATE 75 MG/1
TABLET ORAL
Status: DISCONTINUED | OUTPATIENT
Start: 2025-04-16 | End: 2025-04-16 | Stop reason: HOSPADM

## 2025-04-16 RX ORDER — ACETAMINOPHEN 325 MG/1
650 TABLET ORAL EVERY 4 HOURS PRN
Status: DISCONTINUED | OUTPATIENT
Start: 2025-04-16 | End: 2025-04-16 | Stop reason: HOSPADM

## 2025-04-16 RX ORDER — PHENYLEPHRINE HYDROCHLORIDE 10 MG/ML
INJECTION INTRAVENOUS
Status: DISCONTINUED | OUTPATIENT
Start: 2025-04-16 | End: 2025-04-16 | Stop reason: HOSPADM

## 2025-04-16 RX ADMIN — ASPIRIN 324 MG: 81 TABLET, CHEWABLE ORAL at 10:07

## 2025-04-16 RX ADMIN — SODIUM CHLORIDE 250 ML: 9 INJECTION, SOLUTION INTRAVENOUS at 10:18

## 2025-04-16 NOTE — INTERVAL H&P NOTE
"  H&P reviewed. The patient was examined and there are no changes to the H&P.      Vitals and Labs today:  Vitals:    04/16/25 0933 04/16/25 0935 04/16/25 0936   BP: 131/82 118/88    BP Location: Right arm Left arm    Patient Position: Lying Lying    Pulse:   105   Resp:   18   Temp:   97.6 °F (36.4 °C)   TempSrc:   Temporal   SpO2:   95%   Weight:   87.6 kg (193 lb 3.2 oz)   Height:   172.7 cm (68\")     Lab Results   Component Value Date    WBC 7.28 04/01/2025    HGB 14.4 04/01/2025    HCT 44.6 04/01/2025    MCV 93.1 04/01/2025     04/01/2025     Lab Results   Component Value Date    GLUCOSE 320 (H) 04/03/2025    BUN 28 (H) 04/03/2025    CREATININE 1.23 04/03/2025     04/03/2025    K 4.6 04/03/2025     04/03/2025    CALCIUM 8.8 04/03/2025    PROTEINTOT 7.1 03/31/2025    ALBUMIN 3.6 04/02/2025    ALT 16 03/31/2025    AST 16 03/31/2025    ALKPHOS 90 03/31/2025    BILITOT 0.8 03/31/2025    GLOB 3.0 03/31/2025    AGRATIO 1.4 03/31/2025    BCR 22.8 04/03/2025    ANIONGAP 11.0 04/03/2025    EGFR 63.6 04/03/2025     Lab Results   Component Value Date    HGBA1C 6.90 (H) 03/30/2025     Lab Results   Component Value Date    CHOL 167 03/30/2025    TRIG 137 03/30/2025    HDL 32 (L) 03/30/2025     (H) 03/30/2025           " Discharge instructions reviewed with patient. Patient verbalized understanding. Patient advised to follow up as directed on discharge instructions. Patient denies questions, needs or concerns at this time. Patient verbalized understanding. No s/sx of distress noted.         Lori Medel RN  08/21/23 [id number]

## 2025-04-16 NOTE — Clinical Note
Balloon inserted in left anterior descending. He is not in the [facility] network so I cannot do a referral to this doctor.  If she wants to see him she can pay out of pocket

## 2025-04-16 NOTE — Clinical Note
First balloon inflation max pressure = 20 tae. First balloon inflation duration = 20 seconds. Second inflation of balloon - Max pressure = 12 tae. 2nd Inflation of balloon - Duration = 10 seconds.

## 2025-04-16 NOTE — CONSULTS
Discussed and taught patient about type 2 diabetes self-management, risk factors, and importance of blood glucose control to reduce complications. Target blood glucose readings and A1c goals per ADA were reviewed. Reviewed with patient current A1c 6.9 and discussed its significance. Signs, symptoms, and treatment of hyperglycemia and hypoglycemia were discussed. Lifestyle changes such as physical activity with MD approval and healthy eating were encouraged. Stressed the importance of strict blood sugar control after surgery to prevent complications such as infection and to promote healing of incision. Encouraged pt to monitor blood sugar at home 1+  times per day and to call PCP if blood sugar is trending high. Encouraged to keep record of blood glucose readings to take to follow up appointment with PCP. 30 minutes in the care and education of this patient.

## 2025-04-17 ENCOUNTER — DOCUMENTATION (OUTPATIENT)
Dept: CARDIAC REHAB | Facility: HOSPITAL | Age: 70
End: 2025-04-17
Payer: COMMERCIAL

## 2025-04-17 ENCOUNTER — CALL CENTER PROGRAMS (OUTPATIENT)
Dept: CALL CENTER | Facility: HOSPITAL | Age: 70
End: 2025-04-17
Payer: COMMERCIAL

## 2025-04-17 LAB — ACT BLD: 331 SECONDS (ref 82–152)

## 2025-04-17 NOTE — PROGRESS NOTES
Cardiac Rehab staff mailed referral letter to patient regarding Phase II Cardiac Rehab program. Instruction for patient to contact [facility] Cardiac Rehab Department for additional program information and to forward referral to closest Cardiac Rehab program.

## 2025-04-17 NOTE — OUTREACH NOTE
PCI/Device Survey      Flowsheet Row Responses   Facility patient discharged from? [facility]   Procedure date 04/16/25   Procedure (if device, specify in description) PCI  [PTCA/Stenting]   PCI site Right  [radial]   Performing MD Dr. Becky Villela   Attempt successful? Yes   Call start time 1113   Call end time 1116   Has the patient had any of the following symptoms since discharge? --  [denies issues or concerns]   Nursing interventions Patient education provided   Is the patient taking prescribed medications: ASA, Plavix   Nursing intervention Reminded to continue to take prescribed medications, Nurse provided patient education   Does the patient have any of the following symptoms related to the cath/surgical site? --  [Right radial site without issues, aware of lifting and activity precautions.]   Nursing intervention Patient education provided   PCI/Device call completed Yes            PATT Augustine Registered Nurse

## 2025-04-17 NOTE — OUTREACH NOTE
PCI/Device Survey      Flowsheet Row Responses   Facility patient discharged from? [facility]   Procedure date 04/16/25   Procedure (if device, specify in description) PCI  [PTCA/stenting]   PCI site Right  [Radial]   Performing MD Dr. Becky Villela   Attempt successful? No   Unsuccessful attempts Attempt 1  [attempted pt and wife]            PATT CHE - Registered Nurse

## 2025-05-23 PROBLEM — I25.10 CORONARY ARTERY DISEASE: Status: ACTIVE | Noted: 2025-05-23

## 2025-05-27 ENCOUNTER — TELEPHONE (OUTPATIENT)
Dept: CARDIOLOGY | Facility: CLINIC | Age: 70
End: 2025-05-27
Payer: COMMERCIAL

## 2025-05-27 NOTE — TELEPHONE ENCOUNTER
Caller: Scott Benz    Relationship to patient: Self    Best call back number: [phone]       Type of visit: F/U APPT    Requested date: PATIENT REQUESTING AN EARLIER TIME ON 5-30-25    If rescheduling, when is the original appointment: 5-30-25     Additional notes:PLEASE CONTACT PATIENT  WITH A SUITABLE TIME.

## 2025-06-16 NOTE — PROGRESS NOTES
[facility] Cardiology    Encounter Date: 2025    Patient ID: Scott Benz is a 70 y.o. male.  : 1955     PCP: Ottoniel Florez MD       Chief Complaint: Coronary artery disease involving native coronary artery of, Essential hypertension, and Dyslipidemia       PROBLEM LIST:  CAD  Echo, 2025: EF 36-40%. Mild-to-mod concentric hypertrophy. Global LV hypokinesis. LVDF consistent with grade III w/high LAP reversible restrictive pattern. Mild AVS. Moderate to severe MR present with an eccentric jet noted. Right pleural effusion.  [facility], 2025: status post THOR to the ramus intermedius and proximal circumflex with balloon PTCA of the obtuse marginal.  Has significant residual disease of distal LAD and RCA as well as right.   [facility], 2025: S/p stenting of distal RCA with 3.0 x 23 mm THOR and distal LAD with 2.0 x 22 mm THOR, reducing 80% stenosis to 0%.   Acute HFrEF  Cardiomyopathy  VHD  Hypertension  Dyslipidemia  DM2  CKD    History of Present Illness  Patient presents today for a follow-up with a history of CAD and cardiac risk factors. Since last visit, Patient has been doing well from a cardiac standpoint. Tolerates activity well. Patient denies any chest pain, shortness of air, palpitations, orthopnea, edema, presyncope or syncope.  Has no cardiac questions or concerns today.      No Known Allergies      Current Outpatient Medications:     aspirin 81 MG EC tablet, Take 1 tablet by mouth Daily., Disp: 100 tablet, Rfl: 3    bumetanide (BUMEX) 0.5 MG tablet, Take 1 tablet by mouth Daily., Disp: 30 tablet, Rfl: 2    clopidogrel (PLAVIX) 75 MG tablet, Take 1 tablet by mouth Daily., Disp: 90 tablet, Rfl: 3    dapagliflozin Propanediol (Farxiga) 10 MG tablet, Take 10 mg by mouth Daily., Disp: , Rfl:     gabapentin (NEURONTIN) 600 MG tablet, Take 1 tablet by mouth 3 (Three) Times a Day., Disp: , Rfl:     glimepiride (AMARYL) 4 MG tablet, Take 1 tablet by mouth 2 (Two)  "Times a Day With Meals., Disp: , Rfl:     Insulin Glargine, 2 Unit Dial, (Toujeo Max SoloStar) 300 UNIT/ML solution pen-injector injection, Inject 20 Units under the skin into the appropriate area as directed Daily., Disp: , Rfl:     metFORMIN (GLUCOPHAGE) 1000 MG tablet, Take 1 tablet by mouth 2 (Two) Times a Day With Meals., Disp: , Rfl:     metoprolol succinate XL (TOPROL-XL) 50 MG 24 hr tablet, Take 1 tablet by mouth Daily., Disp: 30 tablet, Rfl: 0    NOVOLIN R 100 UNIT/ML injection, Inject  under the skin into the appropriate area as directed 3 (Three) Times a Day Before Meals. Per sliding scale., Disp: , Rfl:     ondansetron (ZOFRAN) 4 MG tablet, Take 1 tablet by mouth Every 6 (Six) Hours As Needed for Nausea or Vomiting., Disp: , Rfl:     polyethylene glycol (MiraLax) 17 g packet, Take 17 g by mouth Daily., Disp: 30 each, Rfl: 0    rosuvastatin (CRESTOR) 20 MG tablet, Take 1 tablet by mouth Daily., Disp: , Rfl:     sacubitril-valsartan (ENTRESTO) 24-26 MG tablet, Take 1 tablet by mouth Every 12 (Twelve) Hours., Disp: 60 tablet, Rfl: 0    sulfamethoxazole-trimethoprim (BACTRIM DS,SEPTRA DS) 800-160 MG per tablet, Take 1 tablet by mouth 2 (Two) Times a Day., Disp: , Rfl:     The following portions of the patient's history were reviewed and updated as appropriate: allergies, current medications, past family history, past medical history, past social history, past surgical history and problem list.    ROS  Review of Systems   14 point ROS negative except for that listed in the HPI.         Objective:     /69 (BP Location: Right arm, Patient Position: Sitting, Cuff Size: Adult)   Pulse 63   Ht 172.7 cm (68\")   Wt 87.5 kg (193 lb)   SpO2 98%   BMI 29.35 kg/m²      Physical Exam  Constitutional: Patient appears well-developed and well-nourished.   Neck: Neck supple. No JVD present.   Cardiovascular: Normal rate, regular rhythm and normal heart sounds. No murmur heard.   2+ symmetric pulses. "   Pulmonary/Chest: Breath sounds normal. Does not exhibit tenderness.   Musculoskeletal: Does not exhibit edema.  right BKA  Vitals reviewed.    Data Review:   Lab Results   Component Value Date    GLUCOSE 320 (H) 04/03/2025    BUN 28 (H) 04/03/2025    CREATININE 1.23 04/03/2025    EGFR 63.6 04/03/2025    BCR 22.8 04/03/2025     04/03/2025    K 4.6 04/03/2025    CO2 21.0 (L) 04/03/2025    CALCIUM 8.8 04/03/2025    ALBUMIN 3.6 04/02/2025    AST 16 03/31/2025    ALT 16 03/31/2025     Lab Results   Component Value Date    CHOL 167 03/30/2025    TRIG 137 03/30/2025    HDL 32 (L) 03/30/2025     (H) 03/30/2025      Lab Results   Component Value Date    WBC 10.22 04/16/2025    RBC 5.22 04/16/2025    HGB 15.6 04/16/2025    HCT 48.2 04/16/2025    MCV 92.3 04/16/2025     04/16/2025     Lab Results   Component Value Date    TSH 2.530 03/30/2025     Lab Results   Component Value Date    HGBA1C 6.90 (H) 03/30/2025        Procedures       Advance Care Planning   ACP discussion was held with the patient during this visit. Patient does not have an advance directive, declines further assistance.           Assessment and plan:      Diagnosis Plan   1. Coronary artery disease involving native coronary artery of native heart without angina pectoris  Stable without current angina.  Continue aspirin and Plavix for antiplatelet therapy.  Continue current statin therapy.         2. Essential hypertension  Controlled.  Continue current antihypertensive therapy.      3. Dyslipidemia  Crestor 20 mg daily      4. Chronic HFrEF (heart failure with reduced ejection fraction)  Adult Transthoracic Echo Complete W/ Cont if Necessary Per Protocol        Plan:   Echocardiogram to be obtained in 6 months to reassess EF after GDMT.   Continue current medications.   FU in 6 MO same-day echocardiogram, sooner as needed.  Thank you for allowing us to participate in the care of your patient.       Viktoria Grider PA-C    Please note  that portions of this note may have been completed with a voice recognition program. Efforts were made to edit the dictations, but occasionally words are mistranscribed.

## 2025-06-20 ENCOUNTER — OFFICE VISIT (OUTPATIENT)
Dept: CARDIOLOGY | Facility: CLINIC | Age: 70
End: 2025-06-20
Payer: COMMERCIAL

## 2025-06-20 VITALS
WEIGHT: 193 LBS | SYSTOLIC BLOOD PRESSURE: 108 MMHG | OXYGEN SATURATION: 98 % | BODY MASS INDEX: 29.25 KG/M2 | HEIGHT: 68 IN | DIASTOLIC BLOOD PRESSURE: 69 MMHG | HEART RATE: 63 BPM

## 2025-06-20 DIAGNOSIS — I10 ESSENTIAL HYPERTENSION: ICD-10-CM

## 2025-06-20 DIAGNOSIS — I25.10 CORONARY ARTERY DISEASE INVOLVING NATIVE CORONARY ARTERY OF NATIVE HEART WITHOUT ANGINA PECTORIS: Primary | ICD-10-CM

## 2025-06-20 DIAGNOSIS — I50.22 CHRONIC HFREF (HEART FAILURE WITH REDUCED EJECTION FRACTION): ICD-10-CM

## 2025-06-20 DIAGNOSIS — E78.5 DYSLIPIDEMIA: ICD-10-CM

## 2025-06-20 PROCEDURE — 99214 OFFICE O/P EST MOD 30 MIN: CPT

## 2025-06-20 RX ORDER — SULFAMETHOXAZOLE AND TRIMETHOPRIM 800; 160 MG/1; MG/1
1 TABLET ORAL 2 TIMES DAILY
COMMUNITY
Start: 2025-05-04

## (undated) DEVICE — DRSNG TELFA PAD NONADH STR 1S 3X8IN

## (undated) DEVICE — NC TREK NEO™ CORONARY DILATATION CATHETER 2.50 X 12 MM / RAPID-EXCHANGE: Brand: NC TREK NEO™

## (undated) DEVICE — ST INF PRI SMRTSTE 20DRP 2VLV 24ML 117

## (undated) DEVICE — PK EXTREM LOWR 10

## (undated) DEVICE — HI-TORQUE VERSATURN F GUIDE WIRE FULLY COATED .014 STRAIGHT TIP 190 CM: Brand: HI-TORQUE VERSATURN

## (undated) DEVICE — MINI TREK CORONARY DILATATION CATHETER 2.0 MM X 12 MM / RAPID-EXCHANGE: Brand: MINI TREK

## (undated) DEVICE — MODEL AT P65, P/N 701554-001KIT CONTENTS: HAND CONTROLLER, 3-WAY HIGH-PRESSURE STOPCOCK WITH ROTATING END AND PREMIUM HIGH-PRESSURE TUBING: Brand: ANGIOTOUCH® KIT

## (undated) DEVICE — APPL CHLORAPREP W/TINT 26ML BLU

## (undated) DEVICE — ADULT, W/LG. BACK PAD, RADIOTRANSPARENT ELEMENT AND LEAD WIRE COMPATIBLE W/: Brand: DEFIBRILLATION ELECTRODES

## (undated) DEVICE — HANDPIECE SET WITH HIGH FLOW TIP AND SUCTION TUBE: Brand: INTERPULSE

## (undated) DEVICE — 3M™ IOBAN™ 2 ANTIMICROBIAL INCISE DRAPE 6650EZ: Brand: IOBAN™ 2

## (undated) DEVICE — TR BAND RADIAL ARTERY COMPRESSION DEVICE: Brand: TR BAND

## (undated) DEVICE — CONTAINER,SPECIMEN,OR STERILE,4OZ: Brand: MEDLINE

## (undated) DEVICE — GW INQWIRE FC PTFE STD J/1.5 .035 260

## (undated) DEVICE — INTENDED FOR TISSUE SEPARATION, AND OTHER PROCEDURES THAT REQUIRE A SHARP SURGICAL BLADE TO PUNCTURE OR CUT.: Brand: BARD-PARKER ® SAFETYLOCK CARBON RIB-BACK BLADES

## (undated) DEVICE — GUIDE CATHETER: Brand: MACH1™

## (undated) DEVICE — CVR HNDL LT SURG ACCSSRY BLU STRL

## (undated) DEVICE — 1010 S-DRAPE TOWEL DRAPE 10/BX: Brand: STERI-DRAPE™

## (undated) DEVICE — CATH GUIDE LAUNCHER EBU3.0 6F 100CM

## (undated) DEVICE — ST EXT IV SMRTSTE 2VLV FIX M LL 6ML 41

## (undated) DEVICE — INTRO SHEATH PRELUDE IDEAL SPRNG COIL 021 6F 23X80CM

## (undated) DEVICE — SUT MNCRYL 2/0 SH 27IN UD MCP417H

## (undated) DEVICE — GW PT 2 MS .014 185CM STR TP

## (undated) DEVICE — UNDERGLV SURG BIOGEL INDICAT PF 61/2 GRN

## (undated) DEVICE — BNDG ELAS W/CLIP 4IN 5YD LF STRL

## (undated) DEVICE — BNDG ELAS ELITE V/CLOSE 4IN 5YD LF STRL

## (undated) DEVICE — GUARDIAN LVC: Brand: GUARDIAN

## (undated) DEVICE — PK CATH CARD 10

## (undated) DEVICE — CATH DIAG EXPO M/ PK 5F FL4/FR4 PIG

## (undated) DEVICE — SUCTION CANISTER, 2500CC, RIGID: Brand: DEROYAL

## (undated) DEVICE — DRSNG PAD ABD 8X10IN STRL

## (undated) DEVICE — DISPOSABLE TOURNIQUET CUFF SINGLE BLADDER, DUAL PORT AND QUICK CONNECT CONNECTOR: Brand: COLOR CUFF

## (undated) DEVICE — CATH DIAG EXPO .045 FL3  5F 100CM

## (undated) DEVICE — Device: Brand: FIELDER XT

## (undated) DEVICE — GLV SURG SIGNATURE TOUCH PF LTX 7 STRL

## (undated) DEVICE — SPNG GZ WOVN 4X4IN 12PLY 10/BX STRL

## (undated) DEVICE — UNDERCAST PADDING: Brand: DEROYAL

## (undated) DEVICE — DEV INFL MONARCH 25W

## (undated) DEVICE — MODEL BT2000 P/N 700287-012KIT CONTENTS: MANIFOLD WITH SALINE AND CONTRAST PORTS, SALINE TUBING WITH SPIKE AND HAND SYRINGE, TRANSDUCER: Brand: BT2000 AUTOMATED MANIFOLD KIT

## (undated) DEVICE — SUT ETHLN 4/0 PS2 18IN 1667H

## (undated) DEVICE — DELFI MATCHING LIMB PROTECTION SLEEVES (MLPS) HELP PROTECT THE PATIENT’S LIMB FROM POSSIBLE WRINKLING, PINCHING AND SHEARING OF SKIN AND SOFT TISSUES OF THE LIMB.EACH DELFI MATCHING LIMB PROTECTION SLEEVE IS INTENDED FOR USE WITH A SPECIFIC DELFI TOURNIQUET CUFF. THIS SLEEVE IS SPECIFICALLY FOR THIGH.: Brand: MATCHING LIMB PROTECTION SLEEVES - VARI-FIT